# Patient Record
Sex: MALE | Race: BLACK OR AFRICAN AMERICAN | NOT HISPANIC OR LATINO | Employment: UNEMPLOYED | URBAN - METROPOLITAN AREA
[De-identification: names, ages, dates, MRNs, and addresses within clinical notes are randomized per-mention and may not be internally consistent; named-entity substitution may affect disease eponyms.]

---

## 2020-12-17 ENCOUNTER — HOSPITAL ENCOUNTER (EMERGENCY)
Facility: HOSPITAL | Age: 45
Discharge: HOME | End: 2020-12-17
Attending: EMERGENCY MEDICINE
Payer: MEDICAID

## 2020-12-17 VITALS
HEART RATE: 60 BPM | RESPIRATION RATE: 14 BRPM | SYSTOLIC BLOOD PRESSURE: 118 MMHG | WEIGHT: 162 LBS | OXYGEN SATURATION: 100 % | DIASTOLIC BLOOD PRESSURE: 68 MMHG | TEMPERATURE: 98 F | BODY MASS INDEX: 23.19 KG/M2 | HEIGHT: 70 IN

## 2020-12-17 DIAGNOSIS — Z20.822 COVID-19 VIRUS NOT DETECTED: Primary | ICD-10-CM

## 2020-12-17 LAB
CTP QC/QA: YES
SARS-COV-2 RDRP RESP QL NAA+PROBE: NEGATIVE

## 2020-12-17 PROCEDURE — 99282 EMERGENCY DEPT VISIT SF MDM: CPT | Mod: ,,, | Performed by: EMERGENCY MEDICINE

## 2020-12-17 PROCEDURE — 99284 EMERGENCY DEPT VISIT MOD MDM: CPT

## 2020-12-17 PROCEDURE — 99282 EMERGENCY DEPT VISIT SF MDM: CPT

## 2020-12-17 PROCEDURE — U0002 COVID-19 LAB TEST NON-CDC: HCPCS | Performed by: EMERGENCY MEDICINE

## 2020-12-17 PROCEDURE — 99282 PR EMERGENCY DEPT VISIT,LEVEL II: ICD-10-PCS | Mod: ,,, | Performed by: EMERGENCY MEDICINE

## 2020-12-17 NOTE — DISCHARGE INSTRUCTIONS
Your coronavirus test was NEGATIVE. This could be a false negative as the test is not 100% accurate. Given your symptoms, you should assume you have the virus and should act accordingly. Take over-the-counter medications such as Tylenol, Ibuprofen, Mucinex, Flonase, etc for further management of your symptoms. Hydrate by drinking plenty of water. Get plenty of sleep.    Starting now, you should self quarantine for at least 14 days. You should wear a mask when going out in public and avoid contact with people who are immunocompromised, pregnant or elderly.    Wash your hands frequently.  Do not attend public events until health authorities  otherwise.    So far from what we know about coronavirus, the people who get very sick tend to do so around day 8 of the illness.  If you rapidly worsen, you should return to the emergency department for reassessment.    Return to the Emergency Department for symptoms including but not limited to: worsening symptoms, shortness of breath or chest pain, vomiting with inability to hold down fluids, passing out/fainting/unconsciousness, or other concerning symptoms.

## 2020-12-17 NOTE — ED NOTES
Patient identifiers verified and correct for Mr Amin  C/C: COVID testing SEE NN  APPEARANCE: awake and alert in NAD.  SKIN: warm, dry and intact. No breakdown or bruising.  MUSCULOSKELETAL: Patient moving all extremities spontaneously, no obvious swelling or deformities noted. Ambulates independently.  RESPIRATORY: Denies shortness of breath.Respirations unlabored. Denies cough Denies fevers  ABDOMEN: S/ND/NT, Denies nausea  : voids spontaneously, denies difficulty  Neurologic: AAO x 4; follows commands equal strength in all extremities; denies numbness/tingling. Denies dizziness denies weakness

## 2020-12-17 NOTE — ED NOTES
Patient has been in room with family member in the ED, states he wants to get checked for COVID as the patient recently started coughing, denies symptoms.

## 2020-12-17 NOTE — ED PROVIDER NOTES
Encounter Date: 12/17/2020       History     Chief Complaint   Patient presents with    COVID-19 Concerns     denies symptoms     The history is provided by the patient. No  was used.      Barrington Amin is a 45 y.o. male with medical history of HTN, polysubstance abuse, Hx of SBO presenting to the ED with the chief complaint of COVID-19 concerns. Patient reports his wife was recently in the hospital for surgery and was coughing when she returned home. He expresses concern for COVID-19 exposure. He reports feeling at his baseline health and would not be in the ED otherwise. He denies fever, headache, sinus congestion, sore throat, chest pain, SOB, cough, abdominal pain, nausea, vomiting, urinary or bowel movement changes.        Review of patient's allergies indicates:  Not on File  No past medical history on file.  No past surgical history on file.  No family history on file.  Social History     Tobacco Use    Smoking status: Not on file   Substance Use Topics    Alcohol use: Not on file    Drug use: Not on file     Review of Systems   Constitutional: Negative for fever.   HENT: Negative for sore throat.    Respiratory: Negative for shortness of breath.    Cardiovascular: Negative for chest pain.   Gastrointestinal: Negative for nausea.   Genitourinary: Negative for dysuria.   Musculoskeletal: Negative for back pain.   Skin: Negative for rash.   Neurological: Negative for weakness.   Hematological: Does not bruise/bleed easily.       Physical Exam     Initial Vitals [12/17/20 1511]   BP Pulse Resp Temp SpO2   118/68 60 14 97.7 °F (36.5 °C) 100 %      MAP       --         Physical Exam    Constitutional: He appears well-developed and well-nourished. He is not diaphoretic. No distress.   HENT:   Head: Normocephalic and atraumatic.   Mouth/Throat: Oropharynx is clear and moist.   Eyes: EOM are normal. Pupils are equal, round, and reactive to light.   Neck: Normal range of motion.   Cardiovascular:  Normal rate and regular rhythm.   Pulmonary/Chest: No respiratory distress.   Speaking full sentences without difficulty. No accessory muscle use.   Musculoskeletal: Normal range of motion.   Neurological: He is alert and oriented to person, place, and time.   Skin: Skin is warm and dry. No rash noted.         ED Course   Procedures  Labs Reviewed   SARS-COV-2 RDRP GENE    Narrative:     This test utilizes isothermal nucleic acid amplification   technology to detect the SARS-CoV-2 RdRp nucleic acid segment.   The analytical sensitivity (limit of detection) is 125 genome   equivalents/mL.   A POSITIVE result implies infection with the SARS-CoV-2 virus;   the patient is presumed to be contagious.     A NEGATIVE result means that SARS-CoV-2 nucleic acids are not   present above the limit of detection. A NEGATIVE result should be   treated as presumptive. It does not rule out the possibility of   COVID-19 and should not be the sole basis for treatment decisions.   If COVID-19 is strongly suspected based on clinical and exposure   history, re-testing using an alternate molecular assay should be   considered.   This test is only for use under the Food and Drug   Administration s Emergency Use Authorization (EUA).   Commercial kits are provided by [employer].   Performance characteristics of the EUA have been independently   verified by [facility] Department of   Pathology and Laboratory Medicine.   _________________________________________________________________   The authorized Fact Sheet for Healthcare Providers and the authorized Fact   Sheet for Patients of the ID NOW COVID-19 are available on the FDA   website:     https://www.fda.gov/media/930325/download  https://www.fda.gov/media/054296/download                Imaging Results    None          Medical Decision Making:   History:   Old Medical Records: I decided to obtain old medical records.  Old Records Summarized: records from clinic  visits.  Clinical Tests:   Lab Tests: Reviewed and Ordered       APC / Resident Notes:   45 y.o. male with medical history of HTN, polysubstance abuse, Hx of SBO presenting to the ED c/o COVID-19 concerns. Reports wife was coughing after returning home from surgery and expresses concern for COVID-19 exposure. He denies any symptoms and feels at his baseline health. DDx includes but not limited to viral syndrome, COVID-19, pneumonia, reactive airway disease.    COVID-19 negative. Advised patient that this could be a false negative and to take proper precautions. Advised to self quarantine, wear a mask in public, hand hygiene, etc. He denies any symptoms and feels at his baseline health. No hypoxia or respiratory distress. Patient expresses understanding and agreeable to the plan. Return to ED precautions given for new, worsening, or concerning symptoms.                         Clinical Impression:     ICD-10-CM ICD-9-CM   1. COVID-19 virus not detected  Z03.818 V71.83                      Disposition:   Disposition: Discharged  Condition: Stable     ED Disposition Condition    Discharge Stable        ED Prescriptions     None        Follow-up Information     Follow up With Specialties Details Why Contact Info Additional Information    [facility]   [address]  [facility] [id number] [phone] [facility] Please park in surface lot and check in at central registration desk                                       Hola Vasquez PA-C  12/17/20 1474

## 2021-03-13 ENCOUNTER — IMMUNIZATION (OUTPATIENT)
Dept: PRIMARY CARE CLINIC | Facility: CLINIC | Age: 46
End: 2021-03-13

## 2021-03-13 DIAGNOSIS — Z23 NEED FOR VACCINATION: Primary | ICD-10-CM

## 2021-03-13 PROCEDURE — 91300 PR SARS-COV- 2 COVID-19 VACCINE, NO PRSV, 30MCG/0.3ML, IM: CPT | Mod: S$GLB,,, | Performed by: INTERNAL MEDICINE

## 2021-03-13 PROCEDURE — 0001A PR IMMUNIZ ADMIN, SARS-COV-2 COVID-19 VACC, 30MCG/0.3ML, 1ST DOSE: ICD-10-PCS | Mod: CV19,S$GLB,, | Performed by: INTERNAL MEDICINE

## 2021-03-13 PROCEDURE — 91300 PR SARS-COV- 2 COVID-19 VACCINE, NO PRSV, 30MCG/0.3ML, IM: ICD-10-PCS | Mod: S$GLB,,, | Performed by: INTERNAL MEDICINE

## 2021-03-13 PROCEDURE — 0001A PR IMMUNIZ ADMIN, SARS-COV-2 COVID-19 VACC, 30MCG/0.3ML, 1ST DOSE: CPT | Mod: CV19,S$GLB,, | Performed by: INTERNAL MEDICINE

## 2021-03-13 RX ADMIN — Medication 0.3 ML: at 08:03

## 2021-04-03 ENCOUNTER — IMMUNIZATION (OUTPATIENT)
Dept: PRIMARY CARE CLINIC | Facility: CLINIC | Age: 46
End: 2021-04-03
Payer: MEDICAID

## 2021-04-03 DIAGNOSIS — Z23 NEED FOR VACCINATION: Primary | ICD-10-CM

## 2021-04-03 PROCEDURE — 0002A PR IMMUNIZ ADMIN, SARS-COV-2 COVID-19 VACC, 30MCG/0.3ML, 2ND DOSE: ICD-10-PCS | Mod: CV19,S$GLB,, | Performed by: INTERNAL MEDICINE

## 2021-04-03 PROCEDURE — 0002A PR IMMUNIZ ADMIN, SARS-COV-2 COVID-19 VACC, 30MCG/0.3ML, 2ND DOSE: CPT | Mod: CV19,S$GLB,, | Performed by: INTERNAL MEDICINE

## 2021-04-03 PROCEDURE — 91300 PR SARS-COV- 2 COVID-19 VACCINE, NO PRSV, 30MCG/0.3ML, IM: CPT | Mod: S$GLB,,, | Performed by: INTERNAL MEDICINE

## 2021-04-03 PROCEDURE — 91300 PR SARS-COV- 2 COVID-19 VACCINE, NO PRSV, 30MCG/0.3ML, IM: ICD-10-PCS | Mod: S$GLB,,, | Performed by: INTERNAL MEDICINE

## 2021-04-03 RX ADMIN — Medication 0.3 ML: at 08:04

## 2021-10-13 ENCOUNTER — IMMUNIZATION (OUTPATIENT)
Dept: INTERNAL MEDICINE | Facility: CLINIC | Age: 46
End: 2021-10-13
Payer: MEDICAID

## 2021-10-13 DIAGNOSIS — Z23 NEED FOR VACCINATION: Primary | ICD-10-CM

## 2021-10-13 PROCEDURE — 91300 COVID-19, MRNA, LNP-S, PF, 30 MCG/0.3 ML DOSE VACCINE: CPT | Mod: PBBFAC

## 2021-10-13 PROCEDURE — 0003A COVID-19, MRNA, LNP-S, PF, 30 MCG/0.3 ML DOSE VACCINE: CPT | Mod: PBBFAC,CV19

## 2022-09-24 ENCOUNTER — IMMUNIZATION (OUTPATIENT)
Dept: PRIMARY CARE CLINIC | Facility: CLINIC | Age: 47
End: 2022-09-24
Payer: MEDICAID

## 2022-09-24 PROCEDURE — 90686 IIV4 VACC NO PRSV 0.5 ML IM: CPT | Mod: PBBFAC,PN

## 2022-11-16 ENCOUNTER — IMMUNIZATION (OUTPATIENT)
Dept: INTERNAL MEDICINE | Facility: CLINIC | Age: 47
End: 2022-11-16
Payer: MEDICAID

## 2022-11-16 DIAGNOSIS — Z23 NEED FOR VACCINATION: Primary | ICD-10-CM

## 2022-11-16 PROCEDURE — 91312 COVID-19, MRNA, LNP-S, BIVALENT BOOSTER, PF, 30 MCG/0.3 ML DOSE: CPT | Mod: PBBFAC

## 2022-11-16 PROCEDURE — 0124A COVID-19, MRNA, LNP-S, BIVALENT BOOSTER, PF, 30 MCG/0.3 ML DOSE: CPT | Mod: CV19,PBBFAC | Performed by: INTERNAL MEDICINE

## 2023-02-27 ENCOUNTER — TELEPHONE (OUTPATIENT)
Dept: UROLOGY | Facility: CLINIC | Age: 48
End: 2023-02-27
Payer: COMMERCIAL

## 2023-02-27 NOTE — TELEPHONE ENCOUNTER
SPOKE TO THE PATIENT'S WIFE , ALBER CONTACT THE OFFICE        YEMI DOMINGUEZ    ----- Message from Yemi Kaur MA sent at 2/24/2023  4:11 PM CST -----  Regarding: FW: Patient call back    ----- Message -----  From: Renetta Wilks  Sent: 2/24/2023  11:25 AM CST  To: [facility] Staff  Subject: Patient call back                                Who called: Tnaa (wife)    What is the request in detail: Patient is requesting a call back. Pt would like to schedule an appt with the provider for [employer] health. No further details provided. She would like to further discuss.   Please advise.    Can the clinic reply by [facility]? No    Best call back number: [phone]    Additional Information: N/A

## 2023-03-20 ENCOUNTER — TELEPHONE (OUTPATIENT)
Dept: INTERNAL MEDICINE | Facility: CLINIC | Age: 48
End: 2023-03-20
Payer: COMMERCIAL

## 2023-03-23 NOTE — TELEPHONE ENCOUNTER
----- Message from Ruby Story sent at 3/20/2023  8:27 AM CDT -----  Type:  Sooner Apoointment Request    Caller is requesting a sooner appointment.  Caller declined first available appointment listed below.  Caller will not accept being placed on the waitlist and is requesting a message be sent to doctor.  Name of Caller:pt   When is the first available appointment?  Symptoms:est care   Would the patient rather a call back or a response via [facility]? [facility]   Best Call Back Number:[phone]  Additional Information: pt would like to est care        
Called pt and helped set up an appt with Dr. Wong. Appt sent to advance user, 04/05 @ 11:30 am   
Spoke to spouse,     Pt is looking for a PCP, Dr. Mazariegos is not taking on new patients. Would you be willing to take this pt on, and if so are you okay with me getting him put on your schedule sooner if it needs to be overrided?   
Home

## 2023-04-05 PROBLEM — B18.2 CHRONIC HEPATITIS C WITHOUT HEPATIC COMA: Status: ACTIVE | Noted: 2018-02-19

## 2024-01-04 ENCOUNTER — HOSPITAL ENCOUNTER (OUTPATIENT)
Dept: RADIOLOGY | Facility: HOSPITAL | Age: 49
Discharge: HOME | End: 2024-01-04
Attending: PHYSICIAN ASSISTANT
Payer: COMMERCIAL

## 2024-01-04 ENCOUNTER — HOSPITAL ENCOUNTER (OUTPATIENT)
Dept: RADIOLOGY | Facility: OTHER | Age: 49
Discharge: HOME | End: 2024-01-04
Attending: PHYSICIAN ASSISTANT
Payer: COMMERCIAL

## 2024-01-04 ENCOUNTER — OFFICE VISIT (OUTPATIENT)
Dept: ORTHOPEDICS | Facility: CLINIC | Age: 49
End: 2024-01-04
Payer: COMMERCIAL

## 2024-01-04 VITALS — HEIGHT: 70 IN | WEIGHT: 162.06 LBS | BODY MASS INDEX: 23.2 KG/M2
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
## 2024-01-04 DIAGNOSIS — M25.551 RIGHT HIP PAIN: ICD-10-CM

## 2024-01-04 DIAGNOSIS — F19.10 IV DRUG ABUSE: ICD-10-CM

## 2024-01-04 DIAGNOSIS — M25.551 RIGHT HIP PAIN: Primary | ICD-10-CM

## 2024-01-04 DIAGNOSIS — F11.11 HISTORY OF HEROIN ABUSE: ICD-10-CM

## 2024-01-04 DIAGNOSIS — B18.2 CHRONIC HEPATITIS C WITHOUT HEPATIC COMA: ICD-10-CM

## 2024-01-04 PROCEDURE — 73502 X-RAY EXAM HIP UNI 2-3 VIEWS: CPT | Mod: 26,RT,, | Performed by: RADIOLOGY

## 2024-01-04 PROCEDURE — 99999 PR PBB SHADOW E&M-EST. PATIENT-LVL III: CPT | Mod: PBBFAC,,, | Performed by: PHYSICIAN ASSISTANT

## 2024-01-04 PROCEDURE — 72195 MRI PELVIS W/O DYE: CPT | Mod: 26,,, | Performed by: RADIOLOGY

## 2024-01-04 PROCEDURE — 99204 OFFICE O/P NEW MOD 45 MIN: CPT | Mod: S$GLB,,, | Performed by: PHYSICIAN ASSISTANT

## 2024-01-04 PROCEDURE — 73502 X-RAY EXAM HIP UNI 2-3 VIEWS: CPT | Mod: TC,RT

## 2024-01-04 PROCEDURE — 3008F BODY MASS INDEX DOCD: CPT | Mod: CPTII,S$GLB,, | Performed by: PHYSICIAN ASSISTANT

## 2024-01-04 PROCEDURE — 72195 MRI PELVIS W/O DYE: CPT | Mod: TC

## 2024-01-04 PROCEDURE — 1159F MED LIST DOCD IN RCRD: CPT | Mod: CPTII,S$GLB,, | Performed by: PHYSICIAN ASSISTANT
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
## 2024-01-05 ENCOUNTER — HOSPITAL ENCOUNTER (INPATIENT)
Facility: HOSPITAL | Age: 49
LOS: 7 days | Discharge: LEFT BEFORE DISCHARGE | DRG: 481 | End: 2024-01-12
Attending: EMERGENCY MEDICINE | Admitting: HOSPITALIST
Payer: COMMERCIAL

## 2024-01-05 ENCOUNTER — TELEPHONE (OUTPATIENT)
Dept: ORTHOPEDICS | Facility: CLINIC | Age: 49
End: 2024-01-05
Payer: COMMERCIAL

## 2024-01-05 DIAGNOSIS — F19.90 IVDU (INTRAVENOUS DRUG USER): ICD-10-CM

## 2024-01-05 DIAGNOSIS — E44.0 MALNUTRITION OF MODERATE DEGREE: ICD-10-CM

## 2024-01-05 DIAGNOSIS — B18.2 CHRONIC HEPATITIS C WITHOUT HEPATIC COMA: ICD-10-CM

## 2024-01-05 DIAGNOSIS — F19.90 SUBSTANCE USE DISORDER: ICD-10-CM

## 2024-01-05 DIAGNOSIS — M25.551 RIGHT HIP PAIN: Primary | ICD-10-CM

## 2024-01-05 DIAGNOSIS — F11.93 OPIOID WITHDRAWAL: ICD-10-CM

## 2024-01-05 DIAGNOSIS — R07.9 CHEST PAIN: ICD-10-CM

## 2024-01-05 DIAGNOSIS — M00.9 SEPTIC ARTHRITIS OF HIP: Primary | ICD-10-CM

## 2024-01-05 DIAGNOSIS — R78.81 MRSA BACTEREMIA: ICD-10-CM

## 2024-01-05 DIAGNOSIS — M00.9 PYOGENIC ARTHRITIS OF RIGHT HIP, DUE TO UNSPECIFIED ORGANISM: ICD-10-CM

## 2024-01-05 DIAGNOSIS — D63.8 ANEMIA OF CHRONIC DISEASE: ICD-10-CM

## 2024-01-05 DIAGNOSIS — T07.XXXA MULTIPLE OPEN WOUNDS: ICD-10-CM

## 2024-01-05 DIAGNOSIS — M86.159: ICD-10-CM

## 2024-01-05 DIAGNOSIS — L98.492 CHRONIC SKIN ULCER WITH FAT LAYER EXPOSED: ICD-10-CM

## 2024-01-05 DIAGNOSIS — B95.62 MRSA BACTEREMIA: ICD-10-CM

## 2024-01-05 DIAGNOSIS — R78.81 BACTEREMIA: ICD-10-CM

## 2024-01-05 DIAGNOSIS — M00.051 STAPHYLOCOCCAL ARTHRITIS OF RIGHT HIP: ICD-10-CM

## 2024-01-05 LAB
ALBUMIN SERPL BCP-MCNC: 2.7 G/DL (ref 3.5–5.2)
ALP SERPL-CCNC: 109 U/L (ref 55–135)
ALT SERPL W/O P-5'-P-CCNC: 19 U/L (ref 10–44)
ANION GAP SERPL CALC-SCNC: 12 MMOL/L (ref 8–16)
AST SERPL-CCNC: 26 U/L (ref 10–40)
BASOPHILS # BLD AUTO: 0.02 K/UL (ref 0–0.2)
BASOPHILS NFR BLD: 0.3 % (ref 0–1.9)
BILIRUB SERPL-MCNC: 0.3 MG/DL (ref 0.1–1)
BUN SERPL-MCNC: 13 MG/DL (ref 6–20)
CALCIUM SERPL-MCNC: 9.7 MG/DL (ref 8.7–10.5)
CHLORIDE SERPL-SCNC: 98 MMOL/L (ref 95–110)
CO2 SERPL-SCNC: 23 MMOL/L (ref 23–29)
CREAT SERPL-MCNC: 0.8 MG/DL (ref 0.5–1.4)
CRP SERPL-MCNC: 112.9 MG/L (ref 0–8.2)
DIFFERENTIAL METHOD BLD: ABNORMAL
EOSINOPHIL # BLD AUTO: 0.1 K/UL (ref 0–0.5)
EOSINOPHIL NFR BLD: 0.9 % (ref 0–8)
ERYTHROCYTE [DISTWIDTH] IN BLOOD BY AUTOMATED COUNT: 18.1 % (ref 11.5–14.5)
EST. GFR  (NO RACE VARIABLE): >60 ML/MIN/1.73 M^2
GLUCOSE SERPL-MCNC: 85 MG/DL (ref 70–110)
HCT VFR BLD AUTO: 32.6 % (ref 40–54)
HGB BLD-MCNC: 9.5 G/DL (ref 14–18)
IMM GRANULOCYTES # BLD AUTO: 0.03 K/UL (ref 0–0.04)
IMM GRANULOCYTES NFR BLD AUTO: 0.4 % (ref 0–0.5)
LYMPHOCYTES # BLD AUTO: 1.6 K/UL (ref 1–4.8)
LYMPHOCYTES NFR BLD: 20.7 % (ref 18–48)
MAGNESIUM SERPL-MCNC: 2 MG/DL (ref 1.6–2.6)
MCH RBC QN AUTO: 20.4 PG (ref 27–31)
MCHC RBC AUTO-ENTMCNC: 29.1 G/DL (ref 32–36)
MCV RBC AUTO: 70 FL (ref 82–98)
MONOCYTES # BLD AUTO: 0.4 K/UL (ref 0.3–1)
MONOCYTES NFR BLD: 5.8 % (ref 4–15)
NEUTROPHILS # BLD AUTO: 5.5 K/UL (ref 1.8–7.7)
NEUTROPHILS NFR BLD: 71.9 % (ref 38–73)
NRBC BLD-RTO: 0 /100 WBC
PLATELET # BLD AUTO: 592 K/UL (ref 150–450)
PMV BLD AUTO: 8.2 FL (ref 9.2–12.9)
POTASSIUM SERPL-SCNC: 4.8 MMOL/L (ref 3.5–5.1)
PROT SERPL-MCNC: 10.4 G/DL (ref 6–8.4)
RBC # BLD AUTO: 4.66 M/UL (ref 4.6–6.2)
SODIUM SERPL-SCNC: 133 MMOL/L (ref 136–145)
WBC # BLD AUTO: 7.65 K/UL (ref 3.9–12.7)

## 2024-01-05 PROCEDURE — 87077 CULTURE AEROBIC IDENTIFY: CPT | Performed by: PHYSICIAN ASSISTANT

## 2024-01-05 PROCEDURE — 87040 BLOOD CULTURE FOR BACTERIA: CPT | Mod: 59 | Performed by: PHYSICIAN ASSISTANT

## 2024-01-05 PROCEDURE — 86140 C-REACTIVE PROTEIN: CPT | Performed by: PHYSICIAN ASSISTANT

## 2024-01-05 PROCEDURE — 85652 RBC SED RATE AUTOMATED: CPT | Performed by: PHYSICIAN ASSISTANT

## 2024-01-05 PROCEDURE — 87154 CUL TYP ID BLD PTHGN 6+ TRGT: CPT | Performed by: PHYSICIAN ASSISTANT

## 2024-01-05 PROCEDURE — 99285 EMERGENCY DEPT VISIT HI MDM: CPT | Mod: 25

## 2024-01-05 PROCEDURE — 25000003 PHARM REV CODE 250: Performed by: PHYSICIAN ASSISTANT

## 2024-01-05 PROCEDURE — 80053 COMPREHEN METABOLIC PANEL: CPT | Performed by: PHYSICIAN ASSISTANT

## 2024-01-05 PROCEDURE — 96375 TX/PRO/DX INJ NEW DRUG ADDON: CPT

## 2024-01-05 PROCEDURE — 85025 COMPLETE CBC W/AUTO DIFF WBC: CPT | Performed by: PHYSICIAN ASSISTANT

## 2024-01-05 PROCEDURE — 63600175 PHARM REV CODE 636 W HCPCS: Performed by: PHYSICIAN ASSISTANT

## 2024-01-05 PROCEDURE — 12000002 HC ACUTE/MED SURGE SEMI-PRIVATE ROOM

## 2024-01-05 PROCEDURE — 87186 SC STD MICRODIL/AGAR DIL: CPT | Performed by: PHYSICIAN ASSISTANT

## 2024-01-05 PROCEDURE — 83735 ASSAY OF MAGNESIUM: CPT | Performed by: PHYSICIAN ASSISTANT

## 2024-01-05 PROCEDURE — 96365 THER/PROPH/DIAG IV INF INIT: CPT

## 2024-01-05 RX ORDER — HYDROMORPHONE HYDROCHLORIDE 1 MG/ML
0.5 INJECTION, SOLUTION INTRAMUSCULAR; INTRAVENOUS; SUBCUTANEOUS
Status: COMPLETED | OUTPATIENT
Start: 2024-01-05 | End: 2024-01-05

## 2024-01-05 RX ADMIN — SODIUM CHLORIDE, POTASSIUM CHLORIDE, SODIUM LACTATE AND CALCIUM CHLORIDE 1000 ML: 600; 310; 30; 20 INJECTION, SOLUTION INTRAVENOUS at 11:01

## 2024-01-05 RX ADMIN — VANCOMYCIN HYDROCHLORIDE 1500 MG: 1.5 INJECTION, POWDER, LYOPHILIZED, FOR SOLUTION INTRAVENOUS at 10:01

## 2024-01-05 RX ADMIN — HYDROMORPHONE HYDROCHLORIDE 0.5 MG: 1 INJECTION, SOLUTION INTRAMUSCULAR; INTRAVENOUS; SUBCUTANEOUS at 11:01

## 2024-01-05 RX ADMIN — PIPERACILLIN SODIUM AND TAZOBACTAM SODIUM 4.5 G: 4; .5 INJECTION, POWDER, FOR SOLUTION INTRAVENOUS at 09:01

## 2024-01-05 NOTE — Clinical Note
Diagnosis: Septic arthritis of hip [302310]   Future Attending Provider: ESTUARDO CORNEJO [[id number]]   Admitting Provider:: ESTUARDO CORNEJO [[id number]]   Reason for IP Medical Treatment  (Clinical interventions that can only be accomplished in the IP setting? ) :: Septic hip, abscess   I certify that Inpatient services for greater than or equal to 2 midnights are medically necessary:: Yes   Plans for Post-Acute care--if anticipated (pick the single best option):: A. No post acute care anticipated at this time

## 2024-01-05 NOTE — TELEPHONE ENCOUNTER
Called patient and reviewed labs and MRI results. Referred to the ED. Patient voiced understanding.

## 2024-01-06 ENCOUNTER — ANESTHESIA EVENT (OUTPATIENT)
Dept: SURGERY | Facility: HOSPITAL | Age: 49
DRG: 481 | End: 2024-01-06
Payer: COMMERCIAL

## 2024-01-06 ENCOUNTER — ANESTHESIA (OUTPATIENT)
Dept: SURGERY | Facility: HOSPITAL | Age: 49
DRG: 481 | End: 2024-01-06
Payer: COMMERCIAL

## 2024-01-06 PROBLEM — F19.90 SUBSTANCE USE DISORDER: Status: ACTIVE | Noted: 2024-01-06

## 2024-01-06 PROBLEM — Z71.89 ACP (ADVANCE CARE PLANNING): Status: ACTIVE | Noted: 2024-01-06

## 2024-01-06 PROBLEM — T07.XXXA MULTIPLE OPEN WOUNDS: Status: ACTIVE | Noted: 2024-01-06

## 2024-01-06 PROBLEM — E44.0 MALNUTRITION OF MODERATE DEGREE: Status: ACTIVE | Noted: 2024-01-06

## 2024-01-06 PROBLEM — Z01.818 PREOPERATIVE CLEARANCE: Status: ACTIVE | Noted: 2024-01-06

## 2024-01-06 PROBLEM — M86.159: Status: ACTIVE | Noted: 2024-01-06

## 2024-01-06 LAB
ACINETOBACTER CALCOACETICUS/BAUMANNII COMPLEX: NOT DETECTED
ALBUMIN SERPL BCP-MCNC: 2.3 G/DL (ref 3.5–5.2)
ALP SERPL-CCNC: 98 U/L (ref 55–135)
ALT SERPL W/O P-5'-P-CCNC: 15 U/L (ref 10–44)
ANION GAP SERPL CALC-SCNC: 10 MMOL/L (ref 8–16)
AST SERPL-CCNC: 20 U/L (ref 10–40)
BACTEROIDES FRAGILIS: NOT DETECTED
BASOPHILS # BLD AUTO: 0.02 K/UL (ref 0–0.2)
BASOPHILS NFR BLD: 0.3 % (ref 0–1.9)
BILIRUB SERPL-MCNC: 0.4 MG/DL (ref 0.1–1)
BUN SERPL-MCNC: 14 MG/DL (ref 6–20)
CALCIUM SERPL-MCNC: 9.3 MG/DL (ref 8.7–10.5)
CANDIDA ALBICANS: NOT DETECTED
CANDIDA AURIS: NOT DETECTED
CANDIDA GLABRATA: NOT DETECTED
CANDIDA KRUSEI: NOT DETECTED
CANDIDA PARAPSILOSIS: NOT DETECTED
CANDIDA TROPICALIS: NOT DETECTED
CHLORIDE SERPL-SCNC: 100 MMOL/L (ref 95–110)
CO2 SERPL-SCNC: 23 MMOL/L (ref 23–29)
CREAT SERPL-MCNC: 0.8 MG/DL (ref 0.5–1.4)
CRYPTOCOCCUS NEOFORMANS/GATTII: NOT DETECTED
CTX-M GENE (ESBL PRODUCER): ABNORMAL
DIFFERENTIAL METHOD BLD: ABNORMAL
ENTEROBACTER CLOACAE COMPLEX: NOT DETECTED
ENTEROBACTERALES: NOT DETECTED
ENTEROCOCCUS FAECALIS: NOT DETECTED
ENTEROCOCCUS FAECIUM: NOT DETECTED
EOSINOPHIL # BLD AUTO: 0.1 K/UL (ref 0–0.5)
EOSINOPHIL NFR BLD: 1.8 % (ref 0–8)
ERYTHROCYTE [DISTWIDTH] IN BLOOD BY AUTOMATED COUNT: 17.8 % (ref 11.5–14.5)
ERYTHROCYTE [SEDIMENTATION RATE] IN BLOOD BY PHOTOMETRIC METHOD: 100 MM/HR (ref 0–23)
ESCHERICHIA COLI: NOT DETECTED
EST. GFR  (NO RACE VARIABLE): >60 ML/MIN/1.73 M^2
FERRITIN SERPL-MCNC: 85 NG/ML (ref 20–300)
FOLATE SERPL-MCNC: 6.4 NG/ML (ref 4–24)
GLUCOSE SERPL-MCNC: 101 MG/DL (ref 70–110)
HAEMOPHILUS INFLUENZAE: NOT DETECTED
HBV SURFACE AG SERPL QL IA: NORMAL
HCT VFR BLD AUTO: 28.2 % (ref 40–54)
HGB BLD-MCNC: 8.1 G/DL (ref 14–18)
HIV 1+2 AB+HIV1 P24 AG SERPL QL IA: NORMAL
IMM GRANULOCYTES # BLD AUTO: 0.03 K/UL (ref 0–0.04)
IMM GRANULOCYTES NFR BLD AUTO: 0.5 % (ref 0–0.5)
IMP GENE (CARBAPENEM RESISTANT): ABNORMAL
IRON SERPL-MCNC: 17 UG/DL (ref 45–160)
KLEBSIELLA AEROGENES: NOT DETECTED
KLEBSIELLA OXYTOCA: NOT DETECTED
KLEBSIELLA PNEUMONIAE GROUP: NOT DETECTED
KPC RESISTANCE GENE (CARBAPENEM): ABNORMAL
LISTERIA MONOCYTOGENES: NOT DETECTED
LYMPHOCYTES # BLD AUTO: 1.4 K/UL (ref 1–4.8)
LYMPHOCYTES NFR BLD: 23.1 % (ref 18–48)
MAGNESIUM SERPL-MCNC: 1.9 MG/DL (ref 1.6–2.6)
MCH RBC QN AUTO: 20.1 PG (ref 27–31)
MCHC RBC AUTO-ENTMCNC: 28.7 G/DL (ref 32–36)
MCR-1: ABNORMAL
MCV RBC AUTO: 70 FL (ref 82–98)
MEC A/C AND MREJ (MRSA): DETECTED
MEC A/C: ABNORMAL
MONOCYTES # BLD AUTO: 0.7 K/UL (ref 0.3–1)
MONOCYTES NFR BLD: 12.1 % (ref 4–15)
NDM GENE (CARBAPENEM RESISTANT): ABNORMAL
NEISSERIA MENINGITIDIS: NOT DETECTED
NEUTROPHILS # BLD AUTO: 3.8 K/UL (ref 1.8–7.7)
NEUTROPHILS NFR BLD: 62.2 % (ref 38–73)
NRBC BLD-RTO: 0 /100 WBC
OXA-48-LIKE (CARBAPENEM RESISTANT): ABNORMAL
PHOSPHATE SERPL-MCNC: 3.5 MG/DL (ref 2.7–4.5)
PLATELET # BLD AUTO: 502 K/UL (ref 150–450)
PMV BLD AUTO: 7.8 FL (ref 9.2–12.9)
POTASSIUM SERPL-SCNC: 3.7 MMOL/L (ref 3.5–5.1)
PROT SERPL-MCNC: 8.7 G/DL (ref 6–8.4)
PROTEUS SPECIES: NOT DETECTED
PSEUDOMONAS AERUGINOSA: NOT DETECTED
RBC # BLD AUTO: 4.03 M/UL (ref 4.6–6.2)
SALMONELLA SP: NOT DETECTED
SATURATED IRON: 5 % (ref 20–50)
SERRATIA MARCESCENS: NOT DETECTED
SODIUM SERPL-SCNC: 133 MMOL/L (ref 136–145)
STAPHYLOCOCCUS AUREUS: DETECTED
STAPHYLOCOCCUS EPIDERMIDIS: NOT DETECTED
STAPHYLOCOCCUS LUGDUNESIS: NOT DETECTED
STAPHYLOCOCCUS SPECIES: ABNORMAL
STENOTROPHOMONAS MALTOPHILIA: NOT DETECTED
STREPTOCOCCUS AGALACTIAE: NOT DETECTED
STREPTOCOCCUS PNEUMONIAE: NOT DETECTED
STREPTOCOCCUS PYOGENES: NOT DETECTED
STREPTOCOCCUS SPECIES: NOT DETECTED
TOTAL IRON BINDING CAPACITY: 330 UG/DL (ref 250–450)
TRANSFERRIN SERPL-MCNC: 223 MG/DL (ref 200–375)
VAN A/B (VRE GENE): ABNORMAL
VANCOMYCIN SERPL-MCNC: 13.3 UG/ML
VIM GENE (CARBAPENEM RESISTANT): ABNORMAL
VIT B12 SERPL-MCNC: 386 PG/ML (ref 210–950)
WBC # BLD AUTO: 6.03 K/UL (ref 3.9–12.7)

## 2024-01-06 PROCEDURE — 87522 HEPATITIS C REVRS TRNSCRPJ: CPT | Performed by: HOSPITALIST

## 2024-01-06 PROCEDURE — 71000033 HC RECOVERY, INTIAL HOUR: Performed by: ORTHOPAEDIC SURGERY

## 2024-01-06 PROCEDURE — D9220A PRA ANESTHESIA: Mod: CRNA,,, | Performed by: NURSE ANESTHETIST, CERTIFIED REGISTERED

## 2024-01-06 PROCEDURE — 63600175 PHARM REV CODE 636 W HCPCS: Performed by: ORTHOPAEDIC SURGERY

## 2024-01-06 PROCEDURE — 87075 CULTR BACTERIA EXCEPT BLOOD: CPT | Performed by: ORTHOPAEDIC SURGERY

## 2024-01-06 PROCEDURE — C1729 CATH, DRAINAGE: HCPCS | Performed by: ORTHOPAEDIC SURGERY

## 2024-01-06 PROCEDURE — 87205 SMEAR GRAM STAIN: CPT | Mod: 59 | Performed by: ORTHOPAEDIC SURGERY

## 2024-01-06 PROCEDURE — 25000003 PHARM REV CODE 250: Performed by: HOSPITALIST

## 2024-01-06 PROCEDURE — S5010 5% DEXTROSE AND 0.45% SALINE: HCPCS | Performed by: HOSPITALIST

## 2024-01-06 PROCEDURE — 85025 COMPLETE CBC W/AUTO DIFF WBC: CPT | Performed by: HOSPITALIST

## 2024-01-06 PROCEDURE — 99499 UNLISTED E&M SERVICE: CPT | Mod: ,,, | Performed by: STUDENT IN AN ORGANIZED HEALTH CARE EDUCATION/TRAINING PROGRAM

## 2024-01-06 PROCEDURE — 27030 ARTHROTOMY HIP W/DRAINAGE: CPT | Mod: RT,,, | Performed by: ORTHOPAEDIC SURGERY

## 2024-01-06 PROCEDURE — 63600175 PHARM REV CODE 636 W HCPCS

## 2024-01-06 PROCEDURE — 87070 CULTURE OTHR SPECIMN AEROBIC: CPT | Mod: 59 | Performed by: ORTHOPAEDIC SURGERY

## 2024-01-06 PROCEDURE — 99223 1ST HOSP IP/OBS HIGH 75: CPT | Mod: ,,, | Performed by: STUDENT IN AN ORGANIZED HEALTH CARE EDUCATION/TRAINING PROGRAM

## 2024-01-06 PROCEDURE — 87340 HEPATITIS B SURFACE AG IA: CPT | Performed by: STUDENT IN AN ORGANIZED HEALTH CARE EDUCATION/TRAINING PROGRAM

## 2024-01-06 PROCEDURE — 84100 ASSAY OF PHOSPHORUS: CPT | Performed by: HOSPITALIST

## 2024-01-06 PROCEDURE — 88305 TISSUE EXAM BY PATHOLOGIST: CPT | Mod: 26,,, | Performed by: STUDENT IN AN ORGANIZED HEALTH CARE EDUCATION/TRAINING PROGRAM

## 2024-01-06 PROCEDURE — 25000003 PHARM REV CODE 250: Performed by: NURSE ANESTHETIST, CERTIFIED REGISTERED

## 2024-01-06 PROCEDURE — 11000001 HC ACUTE MED/SURG PRIVATE ROOM

## 2024-01-06 PROCEDURE — 36000709 HC OR TIME LEV III EA ADD 15 MIN: Performed by: ORTHOPAEDIC SURGERY

## 2024-01-06 PROCEDURE — 87102 FUNGUS ISOLATION CULTURE: CPT | Mod: 59 | Performed by: ORTHOPAEDIC SURGERY

## 2024-01-06 PROCEDURE — 71000016 HC POSTOP RECOV ADDL HR: Performed by: ORTHOPAEDIC SURGERY

## 2024-01-06 PROCEDURE — 63600175 PHARM REV CODE 636 W HCPCS: Performed by: NURSE ANESTHETIST, CERTIFIED REGISTERED

## 2024-01-06 PROCEDURE — 36000708 HC OR TIME LEV III 1ST 15 MIN: Performed by: ORTHOPAEDIC SURGERY

## 2024-01-06 PROCEDURE — 3E0U029 INTRODUCTION OF OTHER ANTI-INFECTIVE INTO JOINTS, OPEN APPROACH: ICD-10-PCS | Performed by: ORTHOPAEDIC SURGERY

## 2024-01-06 PROCEDURE — 86706 HEP B SURFACE ANTIBODY: CPT | Performed by: STUDENT IN AN ORGANIZED HEALTH CARE EDUCATION/TRAINING PROGRAM

## 2024-01-06 PROCEDURE — 37000009 HC ANESTHESIA EA ADD 15 MINS: Performed by: ORTHOPAEDIC SURGERY

## 2024-01-06 PROCEDURE — 82607 VITAMIN B-12: CPT | Performed by: HOSPITALIST

## 2024-01-06 PROCEDURE — 99900035 HC TECH TIME PER 15 MIN (STAT)

## 2024-01-06 PROCEDURE — 82746 ASSAY OF FOLIC ACID SERUM: CPT | Performed by: HOSPITALIST

## 2024-01-06 PROCEDURE — 25000003 PHARM REV CODE 250

## 2024-01-06 PROCEDURE — D9220A PRA ANESTHESIA: Mod: ANES,,, | Performed by: ANESTHESIOLOGY

## 2024-01-06 PROCEDURE — 94761 N-INVAS EAR/PLS OXIMETRY MLT: CPT

## 2024-01-06 PROCEDURE — 27100171 HC OXYGEN HIGH FLOW UP TO 24 HOURS

## 2024-01-06 PROCEDURE — 87389 HIV-1 AG W/HIV-1&-2 AB AG IA: CPT | Performed by: STUDENT IN AN ORGANIZED HEALTH CARE EDUCATION/TRAINING PROGRAM

## 2024-01-06 PROCEDURE — 88305 TISSUE EXAM BY PATHOLOGIST: CPT | Performed by: STUDENT IN AN ORGANIZED HEALTH CARE EDUCATION/TRAINING PROGRAM

## 2024-01-06 PROCEDURE — 80053 COMPREHEN METABOLIC PANEL: CPT | Performed by: HOSPITALIST

## 2024-01-06 PROCEDURE — 63600175 PHARM REV CODE 636 W HCPCS: Performed by: HOSPITALIST

## 2024-01-06 PROCEDURE — 37000008 HC ANESTHESIA 1ST 15 MINUTES: Performed by: ORTHOPAEDIC SURGERY

## 2024-01-06 PROCEDURE — 87077 CULTURE AEROBIC IDENTIFY: CPT | Performed by: ORTHOPAEDIC SURGERY

## 2024-01-06 PROCEDURE — 83735 ASSAY OF MAGNESIUM: CPT | Performed by: HOSPITALIST

## 2024-01-06 PROCEDURE — 87186 SC STD MICRODIL/AGAR DIL: CPT | Performed by: ORTHOPAEDIC SURGERY

## 2024-01-06 PROCEDURE — 82728 ASSAY OF FERRITIN: CPT | Performed by: HOSPITALIST

## 2024-01-06 PROCEDURE — 0SB90ZZ EXCISION OF RIGHT HIP JOINT, OPEN APPROACH: ICD-10-PCS | Performed by: ORTHOPAEDIC SURGERY

## 2024-01-06 PROCEDURE — C1713 ANCHOR/SCREW BN/BN,TIS/BN: HCPCS | Performed by: ORTHOPAEDIC SURGERY

## 2024-01-06 PROCEDURE — 80202 ASSAY OF VANCOMYCIN: CPT | Performed by: HOSPITALIST

## 2024-01-06 PROCEDURE — 99223 1ST HOSP IP/OBS HIGH 75: CPT | Mod: 57,,, | Performed by: ORTHOPAEDIC SURGERY

## 2024-01-06 PROCEDURE — 71000015 HC POSTOP RECOV 1ST HR: Performed by: ORTHOPAEDIC SURGERY

## 2024-01-06 PROCEDURE — 83540 ASSAY OF IRON: CPT | Performed by: HOSPITALIST

## 2024-01-06 DEVICE — KIT GRAFT BONE/SUB STIM 10ML: Type: IMPLANTABLE DEVICE | Site: HIP | Status: FUNCTIONAL

## 2024-01-06 RX ORDER — TOBRAMYCIN 40 MG/ML
INJECTION INTRAMUSCULAR; INTRAVENOUS
Status: DISCONTINUED | OUTPATIENT
Start: 2024-01-06 | End: 2024-01-06 | Stop reason: HOSPADM

## 2024-01-06 RX ORDER — MORPHINE SULFATE 2 MG/ML
4 INJECTION, SOLUTION INTRAMUSCULAR; INTRAVENOUS EVERY 4 HOURS PRN
Status: DISCONTINUED | OUTPATIENT
Start: 2024-01-06 | End: 2024-01-06

## 2024-01-06 RX ORDER — LORAZEPAM 2 MG/ML
1 INJECTION INTRAMUSCULAR EVERY 4 HOURS PRN
Status: DISCONTINUED | OUTPATIENT
Start: 2024-01-06 | End: 2024-01-12 | Stop reason: HOSPADM

## 2024-01-06 RX ORDER — TRAZODONE HYDROCHLORIDE 100 MG/1
100 TABLET ORAL NIGHTLY PRN
Status: DISCONTINUED | OUTPATIENT
Start: 2024-01-06 | End: 2024-01-12 | Stop reason: HOSPADM

## 2024-01-06 RX ORDER — ONDANSETRON HYDROCHLORIDE 2 MG/ML
4 INJECTION, SOLUTION INTRAVENOUS EVERY 8 HOURS PRN
Status: DISCONTINUED | OUTPATIENT
Start: 2024-01-06 | End: 2024-01-12 | Stop reason: HOSPADM

## 2024-01-06 RX ORDER — THIAMINE HCL 100 MG
100 TABLET ORAL DAILY
Status: DISCONTINUED | OUTPATIENT
Start: 2024-01-06 | End: 2024-01-12 | Stop reason: HOSPADM

## 2024-01-06 RX ORDER — DEXAMETHASONE SODIUM PHOSPHATE 4 MG/ML
INJECTION, SOLUTION INTRA-ARTICULAR; INTRALESIONAL; INTRAMUSCULAR; INTRAVENOUS; SOFT TISSUE
Status: DISCONTINUED | OUTPATIENT
Start: 2024-01-06 | End: 2024-01-06

## 2024-01-06 RX ORDER — SODIUM CHLORIDE 0.9 % (FLUSH) 0.9 %
10 SYRINGE (ML) INJECTION EVERY 12 HOURS PRN
Status: DISCONTINUED | OUTPATIENT
Start: 2024-01-06 | End: 2024-01-12 | Stop reason: HOSPADM

## 2024-01-06 RX ORDER — MUPIROCIN 20 MG/G
OINTMENT TOPICAL
Status: DISCONTINUED | OUTPATIENT
Start: 2024-01-06 | End: 2024-01-06 | Stop reason: HOSPADM

## 2024-01-06 RX ORDER — OXYCODONE HYDROCHLORIDE 10 MG/1
10 TABLET ORAL EVERY 6 HOURS PRN
Status: DISCONTINUED | OUTPATIENT
Start: 2024-01-06 | End: 2024-01-12 | Stop reason: HOSPADM

## 2024-01-06 RX ORDER — MIDAZOLAM HYDROCHLORIDE 1 MG/ML
INJECTION, SOLUTION INTRAMUSCULAR; INTRAVENOUS
Status: DISCONTINUED | OUTPATIENT
Start: 2024-01-06 | End: 2024-01-06

## 2024-01-06 RX ORDER — LIDOCAINE HYDROCHLORIDE 20 MG/ML
INJECTION INTRAVENOUS
Status: DISCONTINUED | OUTPATIENT
Start: 2024-01-06 | End: 2024-01-06

## 2024-01-06 RX ORDER — SODIUM CHLORIDE 0.9 % (FLUSH) 0.9 %
10 SYRINGE (ML) INJECTION
Status: DISCONTINUED | OUTPATIENT
Start: 2024-01-06 | End: 2024-01-12 | Stop reason: HOSPADM

## 2024-01-06 RX ORDER — VANCOMYCIN HYDROCHLORIDE 500 MG/10ML
INJECTION, POWDER, LYOPHILIZED, FOR SOLUTION INTRAVENOUS
Status: DISCONTINUED | OUTPATIENT
Start: 2024-01-06 | End: 2024-01-06 | Stop reason: HOSPADM

## 2024-01-06 RX ORDER — IBUPROFEN 200 MG
16 TABLET ORAL
Status: DISCONTINUED | OUTPATIENT
Start: 2024-01-06 | End: 2024-01-12 | Stop reason: HOSPADM

## 2024-01-06 RX ORDER — KETOROLAC TROMETHAMINE 30 MG/ML
15 INJECTION, SOLUTION INTRAMUSCULAR; INTRAVENOUS EVERY 6 HOURS PRN
Status: DISCONTINUED | OUTPATIENT
Start: 2024-01-06 | End: 2024-01-06

## 2024-01-06 RX ORDER — FOLIC ACID 1 MG/1
1 TABLET ORAL DAILY
Status: DISCONTINUED | OUTPATIENT
Start: 2024-01-06 | End: 2024-01-12 | Stop reason: HOSPADM

## 2024-01-06 RX ORDER — PROPOFOL 10 MG/ML
VIAL (ML) INTRAVENOUS
Status: DISCONTINUED | OUTPATIENT
Start: 2024-01-06 | End: 2024-01-06

## 2024-01-06 RX ORDER — GLUCAGON 1 MG
1 KIT INJECTION
Status: DISCONTINUED | OUTPATIENT
Start: 2024-01-06 | End: 2024-01-12 | Stop reason: HOSPADM

## 2024-01-06 RX ORDER — PREGABALIN 75 MG/1
75 CAPSULE ORAL 2 TIMES DAILY
Status: DISCONTINUED | OUTPATIENT
Start: 2024-01-06 | End: 2024-01-12 | Stop reason: HOSPADM

## 2024-01-06 RX ORDER — CLONIDINE HYDROCHLORIDE 0.1 MG/1
0.1 TABLET ORAL EVERY 8 HOURS PRN
Status: DISCONTINUED | OUTPATIENT
Start: 2024-01-06 | End: 2024-01-12 | Stop reason: HOSPADM

## 2024-01-06 RX ORDER — CYCLOBENZAPRINE HCL 5 MG
5 TABLET ORAL EVERY 8 HOURS PRN
Status: DISCONTINUED | OUTPATIENT
Start: 2024-01-06 | End: 2024-01-06

## 2024-01-06 RX ORDER — IBUPROFEN 200 MG
24 TABLET ORAL
Status: DISCONTINUED | OUTPATIENT
Start: 2024-01-06 | End: 2024-01-12 | Stop reason: HOSPADM

## 2024-01-06 RX ORDER — DEXMEDETOMIDINE HYDROCHLORIDE 100 UG/ML
INJECTION, SOLUTION INTRAVENOUS
Status: DISCONTINUED | OUTPATIENT
Start: 2024-01-06 | End: 2024-01-06

## 2024-01-06 RX ORDER — ACETAMINOPHEN 325 MG/1
650 TABLET ORAL EVERY 4 HOURS PRN
Status: DISCONTINUED | OUTPATIENT
Start: 2024-01-06 | End: 2024-01-06

## 2024-01-06 RX ORDER — MUPIROCIN 20 MG/G
OINTMENT TOPICAL
Status: DISPENSED
Start: 2024-01-06 | End: 2024-01-06

## 2024-01-06 RX ORDER — METHOCARBAMOL 750 MG/1
750 TABLET, FILM COATED ORAL 3 TIMES DAILY
Status: DISCONTINUED | OUTPATIENT
Start: 2024-01-06 | End: 2024-01-12 | Stop reason: HOSPADM

## 2024-01-06 RX ORDER — HYDROXYZINE HYDROCHLORIDE 25 MG/1
50 TABLET, FILM COATED ORAL EVERY 6 HOURS PRN
Status: DISCONTINUED | OUTPATIENT
Start: 2024-01-06 | End: 2024-01-12 | Stop reason: HOSPADM

## 2024-01-06 RX ORDER — NALOXONE HCL 0.4 MG/ML
0.02 VIAL (ML) INJECTION
Status: DISCONTINUED | OUTPATIENT
Start: 2024-01-06 | End: 2024-01-12 | Stop reason: HOSPADM

## 2024-01-06 RX ORDER — FENTANYL CITRATE 50 UG/ML
INJECTION, SOLUTION INTRAMUSCULAR; INTRAVENOUS
Status: DISCONTINUED | OUTPATIENT
Start: 2024-01-06 | End: 2024-01-06

## 2024-01-06 RX ORDER — METHOCARBAMOL 500 MG/1
500 TABLET, FILM COATED ORAL 4 TIMES DAILY PRN
Status: DISCONTINUED | OUTPATIENT
Start: 2024-01-06 | End: 2024-01-06

## 2024-01-06 RX ORDER — ONDANSETRON 2 MG/ML
INJECTION INTRAMUSCULAR; INTRAVENOUS
Status: DISCONTINUED | OUTPATIENT
Start: 2024-01-06 | End: 2024-01-06

## 2024-01-06 RX ORDER — OXYCODONE HYDROCHLORIDE 5 MG/1
5 TABLET ORAL EVERY 6 HOURS PRN
Status: DISCONTINUED | OUTPATIENT
Start: 2024-01-06 | End: 2024-01-12 | Stop reason: HOSPADM

## 2024-01-06 RX ORDER — KETAMINE HCL IN 0.9 % NACL 50 MG/5 ML
SYRINGE (ML) INTRAVENOUS
Status: DISCONTINUED | OUTPATIENT
Start: 2024-01-06 | End: 2024-01-06

## 2024-01-06 RX ORDER — DEXTROSE MONOHYDRATE AND SODIUM CHLORIDE 5; .45 G/100ML; G/100ML
INJECTION, SOLUTION INTRAVENOUS CONTINUOUS
Status: DISCONTINUED | OUTPATIENT
Start: 2024-01-06 | End: 2024-01-06

## 2024-01-06 RX ORDER — ACETAMINOPHEN 500 MG
1000 TABLET ORAL EVERY 8 HOURS
Status: DISCONTINUED | OUTPATIENT
Start: 2024-01-06 | End: 2024-01-12 | Stop reason: HOSPADM

## 2024-01-06 RX ORDER — AMOXICILLIN 250 MG
2 CAPSULE ORAL 2 TIMES DAILY PRN
Status: DISCONTINUED | OUTPATIENT
Start: 2024-01-06 | End: 2024-01-12 | Stop reason: HOSPADM

## 2024-01-06 RX ORDER — LOPERAMIDE HYDROCHLORIDE 2 MG/1
2 CAPSULE ORAL
Status: DISCONTINUED | OUTPATIENT
Start: 2024-01-06 | End: 2024-01-12 | Stop reason: HOSPADM

## 2024-01-06 RX ORDER — ACETAMINOPHEN 10 MG/ML
INJECTION, SOLUTION INTRAVENOUS
Status: DISCONTINUED | OUTPATIENT
Start: 2024-01-06 | End: 2024-01-06

## 2024-01-06 RX ORDER — HYDROMORPHONE HYDROCHLORIDE 1 MG/ML
0.5 INJECTION, SOLUTION INTRAMUSCULAR; INTRAVENOUS; SUBCUTANEOUS EVERY 5 MIN PRN
Status: DISCONTINUED | OUTPATIENT
Start: 2024-01-06 | End: 2024-01-06 | Stop reason: HOSPADM

## 2024-01-06 RX ORDER — ROCURONIUM BROMIDE 10 MG/ML
INJECTION, SOLUTION INTRAVENOUS
Status: DISCONTINUED | OUTPATIENT
Start: 2024-01-06 | End: 2024-01-06

## 2024-01-06 RX ORDER — HYDROMORPHONE HYDROCHLORIDE 1 MG/ML
0.2 INJECTION, SOLUTION INTRAMUSCULAR; INTRAVENOUS; SUBCUTANEOUS EVERY 5 MIN PRN
Status: DISCONTINUED | OUTPATIENT
Start: 2024-01-06 | End: 2024-01-06

## 2024-01-06 RX ORDER — ALUMINUM HYDROXIDE, MAGNESIUM HYDROXIDE, AND SIMETHICONE 1200; 120; 1200 MG/30ML; MG/30ML; MG/30ML
30 SUSPENSION ORAL 4 TIMES DAILY PRN
Status: DISCONTINUED | OUTPATIENT
Start: 2024-01-06 | End: 2024-01-12 | Stop reason: HOSPADM

## 2024-01-06 RX ADMIN — PIPERACILLIN SODIUM AND TAZOBACTAM SODIUM 4.5 G: 4; .5 INJECTION, POWDER, FOR SOLUTION INTRAVENOUS at 10:01

## 2024-01-06 RX ADMIN — DEXAMETHASONE SODIUM PHOSPHATE 8 MG: 4 INJECTION, SOLUTION INTRAMUSCULAR; INTRAVENOUS at 08:01

## 2024-01-06 RX ADMIN — SODIUM CHLORIDE: 0.9 INJECTION, SOLUTION INTRAVENOUS at 07:01

## 2024-01-06 RX ADMIN — PREGABALIN 75 MG: 50 CAPSULE ORAL at 08:01

## 2024-01-06 RX ADMIN — PIPERACILLIN SODIUM AND TAZOBACTAM SODIUM 4.5 G: 4; .5 INJECTION, POWDER, FOR SOLUTION INTRAVENOUS at 06:01

## 2024-01-06 RX ADMIN — Medication 20 MG: at 08:01

## 2024-01-06 RX ADMIN — HYDROMORPHONE HYDROCHLORIDE 0.5 MG: 1 INJECTION, SOLUTION INTRAMUSCULAR; INTRAVENOUS; SUBCUTANEOUS at 09:01

## 2024-01-06 RX ADMIN — LIDOCAINE HYDROCHLORIDE 100 MG: 20 INJECTION INTRAVENOUS at 07:01

## 2024-01-06 RX ADMIN — OXYCODONE HYDROCHLORIDE 10 MG: 10 TABLET ORAL at 09:01

## 2024-01-06 RX ADMIN — OXYCODONE HYDROCHLORIDE 10 MG: 10 TABLET ORAL at 08:01

## 2024-01-06 RX ADMIN — VANCOMYCIN HYDROCHLORIDE 1000 MG: 1 INJECTION, POWDER, LYOPHILIZED, FOR SOLUTION INTRAVENOUS at 02:01

## 2024-01-06 RX ADMIN — HYDROMORPHONE HYDROCHLORIDE 0.5 MG: 1 INJECTION, SOLUTION INTRAMUSCULAR; INTRAVENOUS; SUBCUTANEOUS at 10:01

## 2024-01-06 RX ADMIN — DEXTROSE AND SODIUM CHLORIDE: 5; 450 INJECTION, SOLUTION INTRAVENOUS at 01:01

## 2024-01-06 RX ADMIN — ACETAMINOPHEN 1000 MG: 10 INJECTION, SOLUTION INTRAVENOUS at 08:01

## 2024-01-06 RX ADMIN — MUPIROCIN: 20 OINTMENT TOPICAL at 07:01

## 2024-01-06 RX ADMIN — CEFAZOLIN 2 G: 2 INJECTION, POWDER, FOR SOLUTION INTRAMUSCULAR; INTRAVENOUS at 08:01

## 2024-01-06 RX ADMIN — ONDANSETRON 4 MG: 2 INJECTION INTRAMUSCULAR; INTRAVENOUS at 08:01

## 2024-01-06 RX ADMIN — FENTANYL CITRATE 50 MCG: 50 INJECTION, SOLUTION INTRAMUSCULAR; INTRAVENOUS at 08:01

## 2024-01-06 RX ADMIN — PROPOFOL 150 MG: 10 INJECTION, EMULSION INTRAVENOUS at 07:01

## 2024-01-06 RX ADMIN — Medication 30 MG: at 08:01

## 2024-01-06 RX ADMIN — FENTANYL CITRATE 50 MCG: 50 INJECTION, SOLUTION INTRAMUSCULAR; INTRAVENOUS at 09:01

## 2024-01-06 RX ADMIN — METHOCARBAMOL 750 MG: 750 TABLET ORAL at 02:01

## 2024-01-06 RX ADMIN — ROCURONIUM BROMIDE 50 MG: 10 INJECTION, SOLUTION INTRAVENOUS at 07:01

## 2024-01-06 RX ADMIN — METHOCARBAMOL 750 MG: 750 TABLET ORAL at 08:01

## 2024-01-06 RX ADMIN — SUGAMMADEX 200 MG: 100 INJECTION, SOLUTION INTRAVENOUS at 09:01

## 2024-01-06 RX ADMIN — Medication 100 MG: at 10:01

## 2024-01-06 RX ADMIN — FENTANYL CITRATE 50 MCG: 50 INJECTION, SOLUTION INTRAMUSCULAR; INTRAVENOUS at 07:01

## 2024-01-06 RX ADMIN — FOLIC ACID 1 MG: 1 TABLET ORAL at 09:01

## 2024-01-06 RX ADMIN — DEXMEDETOMIDINE 6 MCG: 100 INJECTION, SOLUTION, CONCENTRATE INTRAVENOUS at 08:01

## 2024-01-06 RX ADMIN — ACETAMINOPHEN 1000 MG: 325 TABLET ORAL at 04:01

## 2024-01-06 RX ADMIN — PREGABALIN 75 MG: 50 CAPSULE ORAL at 09:01

## 2024-01-06 RX ADMIN — MIDAZOLAM HYDROCHLORIDE 2 MG: 1 INJECTION, SOLUTION INTRAMUSCULAR; INTRAVENOUS at 07:01

## 2024-01-06 NOTE — SUBJECTIVE & OBJECTIVE
Past Medical History:   Diagnosis Date    Hypertension        No past surgical history on file.    Review of patient's allergies indicates:  No Known Allergies    No current facility-administered medications on file prior to encounter.     Current Outpatient Medications on File Prior to Encounter   Medication Sig    mirtazapine (REMERON) 30 MG tablet Take 30 mg by mouth.    phenytoin (DILANTIN) 100 MG ER capsule Take 100 mg by mouth.    traZODone (DESYREL) 100 MG tablet Take 100 mg by mouth every evening.     Family History    None       Tobacco Use    Smoking status: Never    Smokeless tobacco: Never   Substance and Sexual Activity    Alcohol use: Never    Drug use: Never    Sexual activity: Not on file     Review of Systems   Constitutional:  Positive for fatigue. Negative for activity change, appetite change, chills, diaphoresis, fever and unexpected weight change.   HENT:  Negative for congestion, dental problem, drooling, ear discharge, ear pain, facial swelling, hearing loss, mouth sores, nosebleeds, postnasal drip, rhinorrhea, sinus pressure, sneezing, sore throat, tinnitus, trouble swallowing and voice change.    Eyes:  Negative for photophobia, pain, discharge, redness, itching and visual disturbance.   Respiratory:  Negative for apnea, cough, choking, chest tightness, shortness of breath, wheezing and stridor.    Cardiovascular:  Negative for chest pain, palpitations and leg swelling.   Gastrointestinal:  Negative for abdominal distention, abdominal pain, anal bleeding, blood in stool, constipation, diarrhea, nausea, rectal pain and vomiting.   Endocrine: Negative for cold intolerance, heat intolerance, polydipsia, polyphagia and polyuria.   Genitourinary:  Negative for decreased urine volume, difficulty urinating, dysuria, enuresis, flank pain, frequency, genital sores, hematuria, penile discharge, penile pain, penile swelling, scrotal swelling, testicular pain and urgency.   Musculoskeletal:  Positive  for arthralgias (right hip pain) and joint swelling (R hip swelling). Negative for back pain, gait problem, myalgias, neck pain and neck stiffness.   Skin:  Positive for wound (chronic wound over bilateral upper and lower extremities w/o drainage). Negative for color change, pallor and rash.   Allergic/Immunologic: Negative for environmental allergies, food allergies and immunocompromised state.   Neurological:  Negative for dizziness, tremors, seizures, syncope, facial asymmetry, speech difficulty, weakness, light-headedness, numbness and headaches.   Hematological:  Negative for adenopathy. Does not bruise/bleed easily.   Psychiatric/Behavioral:  Negative for agitation, behavioral problems, confusion, decreased concentration, dysphoric mood, hallucinations, self-injury, sleep disturbance and suicidal ideas. The patient is nervous/anxious. The patient is not hyperactive.      Objective:     Vital Signs (Most Recent):  Temp: 99.2 °F (37.3 °C) (01/05/24 1805)  Pulse: 77 (01/05/24 1805)  Resp: 16 (01/05/24 2330)  BP: 113/63 (01/05/24 1805)  SpO2: 99 % (01/05/24 1805) Vital Signs (24h Range):  Temp:  [99.2 °F (37.3 °C)] 99.2 °F (37.3 °C)  Pulse:  [77] 77  Resp:  [15-16] 16  SpO2:  [99 %] 99 %  BP: (113)/(63) 113/63     Weight: 70.3 kg (155 lb)  Body mass index is 25.79 kg/m².     Physical Exam  Constitutional:       General: He is not in acute distress.     Appearance: He is well-developed. He is ill-appearing. He is not diaphoretic.   HENT:      Head: Normocephalic and atraumatic.      Nose: Nose normal.      Mouth/Throat:      Pharynx: No oropharyngeal exudate.   Eyes:      General: No scleral icterus.     Conjunctiva/sclera: Conjunctivae normal.      Pupils: Pupils are equal, round, and reactive to light.   Neck:      Thyroid: No thyromegaly.      Vascular: No JVD.      Trachea: No tracheal deviation.   Cardiovascular:      Rate and Rhythm: Normal rate and regular rhythm.      Heart sounds: Normal heart sounds. No  murmur heard.  Pulmonary:      Effort: Pulmonary effort is normal. No respiratory distress.      Breath sounds: Normal breath sounds. No wheezing or rales.   Chest:      Chest wall: No tenderness.   Abdominal:      General: Bowel sounds are normal. There is no distension.      Palpations: Abdomen is soft. There is no mass.      Tenderness: There is no abdominal tenderness. There is no guarding or rebound.   Musculoskeletal:         General: Swelling (mild swelling over R hip) and tenderness (TTP over R hip with limited ROM due to pain) present.      Cervical back: Normal range of motion and neck supple.      Comments: Limited ROM of R LE due to hip pain    Lymphadenopathy:      Cervical: No cervical adenopathy.   Skin:     General: Skin is warm and dry.      Findings: Lesion (large are of chronic wounds over bilateral upper and lower extremities w/o eschar or drainage (see pictures under media section)) present. No erythema or rash.   Neurological:      Mental Status: He is alert and oriented to person, place, and time.      Cranial Nerves: No cranial nerve deficit.      Motor: No abnormal muscle tone.      Coordination: Coordination normal.      Deep Tendon Reflexes: Reflexes are normal and symmetric. Reflexes normal.   Psychiatric:         Thought Content: Thought content normal.         Judgment: Judgment normal.            CRANIAL NERVES     CN III, IV, VI   Pupils are equal, round, and reactive to light.       Significant Labs: All pertinent labs within the past 24 hours have been reviewed.  Recent Lab Results         01/05/24 2017        Albumin 2.7              ALT 19       Anion Gap 12       AST 26  Comment: *Result may be interfered by visible hemolysis       Baso # 0.02       Basophil % 0.3       BILIRUBIN TOTAL 0.3  Comment: For infants and newborns, interpretation of results should be based  on gestational age, weight and in agreement with clinical  observations.    Premature Infant recommended  reference ranges:  Up to 24 hours.............<8.0 mg/dL  Up to 48 hours............<12.0 mg/dL  3-5 days..................<15.0 mg/dL  6-29 days.................<15.0 mg/dL         BUN 13       Calcium 9.7       Chloride 98       CO2 23       Creatinine 0.8       .9       Differential Method Automated       eGFR >60.0       Eos # 0.1       Eosinophil % 0.9       Glucose 85       Gran # (ANC) 5.5       Gran % 71.9       Hematocrit 32.6       Hemoglobin 9.5       Immature Grans (Abs) 0.03  Comment: Mild elevation in immature granulocytes is non specific and   can be seen in a variety of conditions including stress response,   acute inflammation, trauma and pregnancy. Correlation with other   laboratory and clinical findings is essential.         Immature Granulocytes 0.4       Lymph # 1.6       Lymph % 20.7       Magnesium  2.0       MCH 20.4       MCHC 29.1       MCV 70       Mono # 0.4       Mono % 5.8       MPV 8.2       nRBC 0       Platelet Count 592       Potassium 4.8       PROTEIN TOTAL 10.4       RBC 4.66       RDW 18.1       Sodium 133       WBC 7.65               Significant Imaging: I have reviewed all pertinent imaging results/findings within the past 24 hours.

## 2024-01-06 NOTE — PLAN OF CARE
"Seen in PACU with family at bedside. Presented with R hip pain in clinic and MRi showing R hip osteomyelitis of right acetabulum and ilium/advanced septic arthritis with phlegmon/myositis and intramuscular abscess seen. Admitted overnight and taken to the OR today with ortho for I and D and arthrotomy with murky fluid seen and CX taken.  Blood CX from 1/5 - update- with 1/6 with staph, repeat ordered at 3 pm.    Hg 8.1 with MCV 70 and iron/b12/folate checked overnight and wnl, likely anemia of chronic inflammation given chronic infection with hip infection as well as chronic UE wounds throughout forearms. Wound care consulted, family reports at time have yellow drainage but deny purulence from hip or forearms. He denies currently injecting drugs into arm wounds but sequelae of previous injections as cause it appears with long term cocaine/heroin use as well as tobacco and alcohol use as well. He denies any issues with withdrawal, says its been "a long time" since he's had issues with that and on protocols for monitoring.  Ortho reports advanced destruction of joint and would not be a candidate for hip replaceent long term unless can stay abstinent from drugs per notes.  Pain not controlled at home on just ibuprofen and multimodals for pain on board, cautious with hx of drug use but has need for acute pain control at this time with acute medical issues as well.   Hx of hep C with VL 1.17 million at [facility] [place] as has been planning for treatment with workups, VL here pending. Will refer to hep C clinic as can do virtual visit if stays abstinent of drugs while at presumed LTAC that he will likely be needing once medically stable as we discussed likely will need antibiotics for 6-8 weeks and we discussed that will likely need LTAC for this ater hospital stay and he voiced understanding.    "

## 2024-01-06 NOTE — SUBJECTIVE & OBJECTIVE
Past Medical History:   Diagnosis Date    Hypertension        History reviewed. No pertinent surgical history.    Review of patient's allergies indicates:  No Known Allergies    Medications:  Medications Prior to Admission   Medication Sig    traZODone (DESYREL) 100 MG tablet Take 100 mg by mouth every evening.    mirtazapine (REMERON) 30 MG tablet Take 30 mg by mouth.    phenytoin (DILANTIN) 100 MG ER capsule Take 100 mg by mouth.     Antibiotics (From admission, onward)      Start     Stop Route Frequency Ordered    01/06/24 1145  vancomycin (VANCOCIN) 1,000 mg in dextrose 5 % (D5W) 250 mL IVPB (Vial-Mate)         -- IV Every 12 hours (non-standard times) 01/06/24 1138    01/06/24 1045  piperacillin-tazobactam (ZOSYN) 4.5 g in dextrose 5 % in water (D5W) 100 mL IVPB (MB+)         -- IV Every 8 hours (non-standard times) 01/06/24 0933    01/06/24 1032  vancomycin - pharmacy to dose  (vancomycin IVPB (PEDS and ADULTS))        See Hyperspace for full Linked Orders Report.    -- IV pharmacy to manage frequency 01/06/24 0932    01/06/24 0723  mupirocin (BACTROBAN) 2 % ointment        Note to Pharmacy: Created by cabinet override    01/06/24 1929   01/06/24 0723          Antifungals (From admission, onward)      None          Antivirals (From admission, onward)      None             Immunization History   Administered Date(s) Administered    COVID-19, MRNA, LN-S, PF (Pfizer) (Purple Cap) 03/13/2021, 04/03/2021, 10/13/2021    COVID-19, mRNA, LNP-S, bivalent booster, PF (PFIZER OMICRON) 11/16/2022    Influenza - Quadrivalent - PF *Preferred* (6 months and older) 09/24/2022    Tdap 07/18/2017       Family History    None       Social History     Socioeconomic History    Marital status:    Tobacco Use    Smoking status: Never    Smokeless tobacco: Never   Substance and Sexual Activity    Alcohol use: Never    Drug use: Never     Review of Systems   Constitutional:  Negative for chills, diaphoresis and fever.   HENT:   Negative for congestion, mouth sores, sore throat and trouble swallowing.    Eyes:  Negative for pain.   Respiratory:  Negative for cough and shortness of breath.    Cardiovascular:  Negative for chest pain and leg swelling.   Gastrointestinal:  Negative for abdominal distention, abdominal pain, diarrhea, nausea and vomiting.   Genitourinary:  Negative for decreased urine volume, difficulty urinating, dysuria and flank pain.   Musculoskeletal:  Positive for arthralgias. Negative for back pain, joint swelling and neck pain.   Skin:  Positive for wound. Negative for color change and rash.   Neurological:  Negative for seizures and syncope.   Psychiatric/Behavioral:  Negative for confusion.      Objective:     Vital Signs (Most Recent):  Temp: 97.6 °F (36.4 °C) (01/06/24 1200)  Pulse: 63 (01/06/24 1200)  Resp: 16 (01/06/24 1200)  BP: 120/74 (01/06/24 1200)  SpO2: 97 % (01/06/24 1200) Vital Signs (24h Range):  Temp:  [97.6 °F (36.4 °C)-99.6 °F (37.6 °C)] 97.6 °F (36.4 °C)  Pulse:  [63-84] 63  Resp:  [8-26] 16  SpO2:  [96 %-100 %] 97 %  BP: (103-136)/(56-87) 120/74     Weight: 71.2 kg (156 lb 15.5 oz)  Body mass index is 26.12 kg/m².    Estimated Creatinine Clearance: 99.3 mL/min (based on SCr of 0.8 mg/dL).     Physical Exam  Vitals and nursing note reviewed.   Constitutional:       General: He is not in acute distress.     Appearance: He is ill-appearing (chronically). He is not toxic-appearing or diaphoretic.   HENT:      Nose: Nose normal. No congestion.      Mouth/Throat:      Mouth: Mucous membranes are moist.      Pharynx: Oropharynx is clear.   Eyes:      General: No scleral icterus.     Conjunctiva/sclera: Conjunctivae normal.   Cardiovascular:      Rate and Rhythm: Normal rate.      Heart sounds: Normal heart sounds. No murmur heard.  Pulmonary:      Effort: Pulmonary effort is normal. No respiratory distress.      Breath sounds: Normal breath sounds.   Abdominal:      General: Abdomen is flat. Bowel sounds are  normal. There is no distension.      Palpations: Abdomen is soft.      Tenderness: There is no abdominal tenderness.   Musculoskeletal:         General: Swelling and tenderness present.      Cervical back: Normal range of motion. No rigidity or tenderness.      Right lower leg: No edema.      Left lower leg: No edema.      Comments: R hip / thigh pain, appropriate post-op. No redness, warmth, or fluctuance. Surgical drain in place; draining SS/bloody fluid.    Skin:     General: Skin is warm and dry.      Coloration: Skin is not jaundiced.      Findings: Erythema and lesion present. No rash.   Neurological:      Mental Status: He is alert and oriented to person, place, and time. Mental status is at baseline.   Psychiatric:         Behavior: Behavior normal.         Thought Content: Thought content normal.                    Significant Labs: Blood Culture:   Recent Labs   Lab 01/05/24 2017 01/05/24 2031   LABBLOO Gram stain derrick bottle: Gram positive cocci in clusters resembling Staph  Results called to and read back by: Mahi Mckeon LPN 01/06/2024  14:52  No Growth to date No Growth to date     CBC:   Recent Labs   Lab 01/05/24 2017 01/06/24  0351   WBC 7.65 6.03   HGB 9.5* 8.1*   HCT 32.6* 28.2*   * 502*     CMP:   Recent Labs   Lab 01/05/24 2017 01/06/24  0351   * 133*   K 4.8 3.7   CL 98 100   CO2 23 23   GLU 85 101   BUN 13 14   CREATININE 0.8 0.8   CALCIUM 9.7 9.3   PROT 10.4* 8.7*   ALBUMIN 2.7* 2.3*   BILITOT 0.3 0.4   ALKPHOS 109 98   AST 26 20   ALT 19 15   ANIONGAP 12 10     Microbiology Results (last 7 days)       Procedure Component Value Units Date/Time    Blood culture [[id number]]     Order Status: Sent Specimen: Blood     Blood culture [[id number]]     Order Status: Sent Specimen: Blood     Rapid Organism ID by PCR (from Blood culture) [[id number]] Collected: 01/05/24 2017    Order Status: No result Updated: 01/06/24 1454    Blood culture #2 **CANNOT BE ORDERED STAT**  [[id number]] Collected: 01/05/24 2017    Order Status: Completed Specimen: Blood from Peripheral, Forearm, Right Updated: 01/06/24 1453     Blood Culture, Routine Gram stain derrick bottle: Gram positive cocci in clusters resembling Staph      Results called to and read back by: Mahi Mckeon LPN 01/06/2024  14:52    Tissue culture [[id number]] Collected: 01/06/24 0851    Order Status: Completed Specimen: Tissue from Hip, Right Updated: 01/06/24 1315     Gram Stain Result No WBC's      No organisms seen    Tissue culture [[id number]] Collected: 01/06/24 0851    Order Status: Completed Specimen: Tissue from Hip, Right Updated: 01/06/24 1149     Gram Stain Result Many WBC's      Rare Gram positive cocci    Tissue culture [[id number]] Collected: 01/06/24 0851    Order Status: Completed Specimen: Tissue from Hip, Right Updated: 01/06/24 1146     Gram Stain Result Moderate WBC's      No organisms seen    Culture, Anaerobe [[id number]] Collected: 01/06/24 0851    Order Status: Sent Specimen: Incision site from Hip, Right Updated: 01/06/24 0912    Culture, Anaerobe [[id number]] Collected: 01/06/24 0851    Order Status: Sent Specimen: Incision site from Hip, Right Updated: 01/06/24 0912    Culture, Anaerobe [[id number]] Collected: 01/06/24 0851    Order Status: Sent Specimen: Incision site from Hip, Right Updated: 01/06/24 0912    Fungus culture [[id number]] Collected: 01/06/24 0851    Order Status: Sent Specimen: Incision site from Hip, Right Updated: 01/06/24 0909    Fungus culture [[id number]] Collected: 01/06/24 0851    Order Status: Sent Specimen: Incision site from Hip, Right Updated: 01/06/24 0909    Fungus culture [[id number]] Collected: 01/06/24 0851    Order Status: Sent Specimen: Incision site from Hip, Right Updated: 01/06/24 0908    Aerobic culture [[id number]] Collected: 01/06/24 0851    Order Status: Canceled Specimen: Incision site from Hip, Right     Aerobic culture [[id number]] Collected: 01/06/24 0851     "Order Status: Canceled Specimen: Incision site from Hip, Right     Aerobic culture [[id number]] Collected: 01/06/24 0851    Order Status: Canceled Specimen: Incision site from Hip, Right     Blood culture #1 **CANNOT BE ORDERED STAT** [[id number]] Collected: 01/05/24 2031    Order Status: Completed Specimen: Blood from Peripheral, Antecubital, Left Updated: 01/06/24 0315     Blood Culture, Routine No Growth to date    Blood culture [[id number]] Collected: 01/05/24 2017    Order Status: Completed Specimen: Blood from Peripheral, Forearm, Right Updated: 01/06/24 0315     Blood Culture, Routine No Growth to date          Pathology Results  (Last 10 years)      None          Wound Culture: No results for input(s): "LABAERO" in the last 4320 hours.  All pertinent labs within the past 24 hours have been reviewed.    Significant Imaging: I have reviewed all pertinent imaging results/findings within the past 24 hours.    I have personally reviewed records / hospital notes from   service and other specialty providers. I have also reviewed CBC, CMP/BMP,  cultures and imaging with my interpretation as documented in my assessment / plan.    Patient is high risk for infectious complications given pt's age, multiple co-morbidities, and case complexity.      Time: 75 minutes   50% of time spent on face-to-face counseling and coordination of care. Counseling included review of test results, diagnosis, and treatment plan with patient and/or family.      "

## 2024-01-06 NOTE — ASSESSMENT & PLAN NOTE
Barrington Amin is a 48 y.o. male with PMH significant for IVDU presenting with right hip pain and concern for septic arthritis secondary to osteomyelitis in the right acetabulum.  Based on the appearance of the patient's pelvic x-ray/MRI along with clinical correlation, patient would benefit from surgical intervention with a washout/total hip replacement.  However, given the fact that the patient is actively using IV drugs, can only off with a washout at this time. Had discussion with the patient regarding benefits of a washout surgical procedure, and he is amenable.     - NPO  - Plan for OR this morning, patient marked, booked, and consented  - Hold Abx until intraoperative cultures taken  - Labs: WBC 7.65, Hgb 9.5, HCT 32.6, , GLU 85, Cr 0.8, .9  - WBAT
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
## 2024-01-06 NOTE — ASSESSMENT & PLAN NOTE
-patient follows at [facility] for health maintenance and to start on treatment in future   -LFT wnl   -check HCV RNA        normal...
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
## 2024-01-06 NOTE — TRANSFER OF CARE
"Anesthesia Transfer of Care Note    Patient: Barrington Amin    Procedure(s) Performed: Procedure(s) (LRB):  IRRIGATION AND DRAINAGE ABSCESS RIGHT THIGH WITH ARTHROTOMY OF RIGHT HIP, TRIOS, ANTIBOTIC BEADS (Right)    Patient location: PACU    Anesthesia Type: general    Transport from OR: Transported from OR on 6-10 L/min O2 by face mask with adequate spontaneous ventilation    Post pain: adequate analgesia    Post assessment: no apparent anesthetic complications and tolerated procedure well    Post vital signs: stable    Level of consciousness: sedated    Nausea/Vomiting: no nausea/vomiting    Complications: none    Transfer of care protocol was followed      Last vitals: Visit Vitals  /75   Pulse 69   Temp 36.7 °C (98 °F) (Oral)   Resp 18   Ht 5' 5" (1.651 m)   Wt 70.3 kg (154 lb 15.7 oz)   SpO2 100%   BMI 25.79 kg/m²     "

## 2024-01-06 NOTE — PROGRESS NOTES
Pharmacokinetic Initial Assessment: IV Vancomycin    Assessment/Plan:    Patient received empiric dose of Intravenous Vancomycin in the setting of ED. Random resulted 13.3 after one dose of 1500 mg post dose.  Restarted Vancomycin therapy with maintenance dose of vancomycin 1000 mg IV every 12 hours.  Desired empiric serum trough concentration is 10 to 20 mcg/mL  Draw vancomycin trough level 60 min prior to fourth dose on 1/8 at approximately 0130  Pharmacy will continue to follow and monitor vancomycin.      Please contact pharmacy at extension [phone] with any questions regarding this assessment.     Thank you for the consult,   Basia Chan       Patient brief summary:  Barrington Amin is a 48 y.o. male initiated on antimicrobial therapy with IV Vancomycin for treatment of suspected bone/joint infection    Drug Allergies:   Review of patient's allergies indicates:  No Known Allergies    Actual Body Weight:   71.2 kg    Renal Function:   Estimated Creatinine Clearance: 99.3 mL/min (based on SCr of 0.8 mg/dL).,     Dialysis Method (if applicable):  N/A    CBC (last 72 hours):  Recent Labs   Lab Result Units 01/04/24 0821 01/05/24 2017 01/06/24  0351   WBC K/uL 6.61 7.65 6.03   Hemoglobin g/dL 9.2* 9.5* 8.1*   Hematocrit % 31.8* 32.6* 28.2*   Platelets K/uL 539* 592* 502*   Gran % % 66.4 71.9 62.2   Lymph % % 21.3 20.7 23.1   Mono % % 10.1 5.8 12.1   Eosinophil % % 1.7 0.9 1.8   Basophil % % 0.3 0.3 0.3   Differential Method  Automated Automated Automated       Metabolic Panel (last 72 hours):  Recent Labs   Lab Result Units 01/04/24 0821 01/05/24 2017 01/06/24  0351   Sodium mmol/L 136 133* 133*   Potassium mmol/L 4.1 4.8 3.7   Chloride mmol/L 99 98 100   CO2 mmol/L 28 23 23   Glucose mg/dL 95 85 101   BUN mg/dL 11 13 14   Creatinine mg/dL 0.8 0.8 0.8   Albumin g/dL 2.5* 2.7* 2.3*   Total Bilirubin mg/dL 0.2 0.3 0.4   Alkaline Phosphatase U/L 113 109 98   AST U/L 21 26 20   ALT U/L 18 19 15   Magnesium mg/dL  --   2.0 1.9   Phosphorus mg/dL  --   --  3.5       Drug levels (last 3 results):  Recent Labs   Lab Result Units 01/06/24  1001   Vancomycin, Random ug/mL 13.3       Microbiologic Results:  Microbiology Results (last 7 days)       Procedure Component Value Units Date/Time    Tissue culture [[id number]] Collected: 01/06/24 0851    Order Status: Completed Specimen: Tissue from Hip, Right Updated: 01/06/24 1315     Gram Stain Result No WBC's      No organisms seen    Tissue culture [[id number]] Collected: 01/06/24 0851    Order Status: Completed Specimen: Tissue from Hip, Right Updated: 01/06/24 1149     Gram Stain Result Many WBC's      Rare Gram positive cocci    Tissue culture [[id number]] Collected: 01/06/24 0851    Order Status: Completed Specimen: Tissue from Hip, Right Updated: 01/06/24 1146     Gram Stain Result Moderate WBC's      No organisms seen    Culture, Anaerobe [[id number]] Collected: 01/06/24 0851    Order Status: Sent Specimen: Incision site from Hip, Right Updated: 01/06/24 0912    Culture, Anaerobe [[id number]] Collected: 01/06/24 0851    Order Status: Sent Specimen: Incision site from Hip, Right Updated: 01/06/24 0912    Culture, Anaerobe [[id number]] Collected: 01/06/24 0851    Order Status: Sent Specimen: Incision site from Hip, Right Updated: 01/06/24 0912    Fungus culture [[id number]] Collected: 01/06/24 0851    Order Status: Sent Specimen: Incision site from Hip, Right Updated: 01/06/24 0909    Fungus culture [[id number]] Collected: 01/06/24 0851    Order Status: Sent Specimen: Incision site from Hip, Right Updated: 01/06/24 0909    Fungus culture [[id number]] Collected: 01/06/24 0851    Order Status: Sent Specimen: Incision site from Hip, Right Updated: 01/06/24 0908    Aerobic culture [[id number]] Collected: 01/06/24 0851    Order Status: Canceled Specimen: Incision site from Hip, Right     Aerobic culture [[id number]] Collected: 01/06/24 0851    Order Status: Canceled Specimen: Incision site  from Hip, Right     Aerobic culture [[id number]] Collected: 01/06/24 0851    Order Status: Canceled Specimen: Incision site from Hip, Right     Blood culture #1 **CANNOT BE ORDERED STAT** [[id number]] Collected: 01/05/24 2031    Order Status: Completed Specimen: Blood from Peripheral, Antecubital, Left Updated: 01/06/24 0315     Blood Culture, Routine No Growth to date    Blood culture #2 **CANNOT BE ORDERED STAT** [[id number]] Collected: 01/05/24 2017    Order Status: Completed Specimen: Blood from Peripheral, Forearm, Right Updated: 01/06/24 0315     Blood Culture, Routine No Growth to date    Blood culture [[id number]] Collected: 01/05/24 2017    Order Status: Completed Specimen: Blood from Peripheral, Forearm, Right Updated: 01/06/24 0315     Blood Culture, Routine No Growth to date

## 2024-01-06 NOTE — ANESTHESIA PROCEDURE NOTES
Intubation    Date/Time: 1/6/2024 8:00 AM    Performed by: Jose Rafael Mejía CRNA  Authorized by: Arnaldo Cohen MD    Intubation:     Induction:  Intravenous    Intubated:  Postinduction    Mask Ventilation:  Not attempted    Attempts:  1    Attempted By:  CRNA    Method of Intubation:  Video laryngoscopy    Laryngeal View Grade: Grade I - full view of cords      Difficult Airway Encountered?: No      Complications:  None    Airway Device:  Oral endotracheal tube    Airway Device Size:  7.5    Style/Cuff Inflation:  Cuffed    Inflation Amount (mL):  8    Tube secured:  22    Secured at:  The lips    Placement Verified By:  Capnometry    Complicating Factors:  None    Findings Post-Intubation:  BS equal bilateral and atraumatic/condition of teeth unchanged

## 2024-01-06 NOTE — ASSESSMENT & PLAN NOTE
Advance Care Planning     Date: 01/06/2024    [facility]  I engaged the patient in a voluntary conversation about advance care planning and we specifically addressed what the goals of care would be moving forward, in light of the patient's change in clinical status, specifically if his heart stops or unable to breath on his own.  We did specifically address the patient's likely prognosis, which is fair .  We explored the patient's values and preferences for future care.  The patient endorses that what is most important right now is to focus on remaining as independent as possible, symptom/pain control, and extending life as long as possible, even it it means sacrificing quality    Accordingly, we have decided that the best plan to meet the patient's goals includes continuing with treatment and wishes to remain full code.     I spent a total of 10 minutes engaging the patient in this advance care planning discussion.

## 2024-01-06 NOTE — ED TRIAGE NOTES
Barrington Amin, a 48 y.o. male presents to the ED w/ complaint of admission for left hip infection    Adult Physical Assessment  LOC: Barrington Amin, 48 y.o. male verified via two identifiers.  The patient is awake, alert, oriented and speaking appropriately at this time.  APPEARANCE: Patient resting comfortably and appears to be in no acute distress at this time. Patient is clean and well groomed, patient's clothing is properly fastened.  SKIN:The skin is warm and dry, color consistent with ethnicity, patient has normal skin turgor. Wounds noted to patients arms. See media  MUSCULOSKELETAL: Patient reports 10/10 pain to left hip. Patient limping with assistance  RESPIRATORY: Airway is open and patent, respirations are spontaneous, patient has a normal effort and rate, no accessory muscle use noted.  CARDIAC: Patient has a normal rate and rhythm, no periphreal edema noted in any extremity, capillary refill < 3 seconds in all extremities  ABDOMEN: Soft and non tender to palpation, no abdominal distention noted. Bowel sounds present in all four quadrants.  NEUROLOGIC: Eyes open spontaneously, behavior appropriate to situation, follows commands, facial expression symmetrical, bilateral hand grasp equal and even, purposeful motor response noted, normal sensation in all extremities when touched with a finger.      Triage note:  Chief Complaint   Patient presents with    Admission     States here for admission/left hip infection     Review of patient's allergies indicates:  No Known Allergies  Past Medical History:   Diagnosis Date    Hypertension
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
## 2024-01-06 NOTE — ED TRIAGE NOTES
Barrington Amin, an 48 y.o. male presents to the ED as a referral from [facility] about a possible infection in his R hip. Hx of fall 3 months ago. +R knee pain.       Chief Complaint   Patient presents with    Admission     States here for admission/left hip infection     Review of patient's allergies indicates:  No Known Allergies  Past Medical History:   Diagnosis Date    Hypertension

## 2024-01-06 NOTE — ASSESSMENT & PLAN NOTE
-septic arthritis of right hip secondary to osteomyelitis in the right acetabulum as seen on MRI (01/04/24) in a patient with IVDU   -elevated ESR and CRP   -afebrile, no leukocytosis and does not appear toxic   -neurovascular intact   -seen by orthopedic in ED and scheduled for surgical washout later today. Patient is deemed not a candidate for total hip replacement at current time due to active IVDU   -received empiric dose of vancomycin and zosyn in ED   -will hold off antibiotics per now per ortho recommendation as patient w/o sepsis and to increase yield of surgical cultures   -start on empiric IV antibiotics post op and tailor based on cultures   -no active cardiac issue and denies chest pain or SOB   -check EKG and no further cardiac testing warranted prior to surgery     Perioperative Risk Assessment:  Patient is at low risk for perioperative cardiopulmonary complications.  Recommend proceed to surgery as planned. We will follow with you during the perioperative period  to manage any active medical issues and prevent postoperative complications.    Perioperative Management Recommendations:    -npo, mIVF, multimodal pain control   -he is not on blood thinner or beta blocker at home   -monitor for alcohol and opioid withdrawal during perioperative period using CIWA and COWS  -will order prn meds for withdrawals   -SCDs for preop DVT PPx

## 2024-01-06 NOTE — NURSING TRANSFER
Nursing Transfer Note      1/6/2024   11:37 AM    Nurse giving handoff:sakina pacheco  Nurse receiving handoff:sakina Champagne    Reason patient is being transferred: admit    Transfer To: 529    Transfer via bed    Transported by heath and transport    Order for Tele Monitor? No    4eyes on Skin: yes    Medicines sent: abx going    Any special needs or follow-up needed: na    Patient belongings transferred with patient: Yes    Chart send with patient: Yes    Notified: spouse    Spouse updated and sent to room with belongings.

## 2024-01-06 NOTE — ASSESSMENT & PLAN NOTE
-chronic in nature w/o signs of active infection   -likely induced by Xylazine (Horse tranquilizer) mixed with heroin by suppliers   -will consult wound care

## 2024-01-06 NOTE — ASSESSMENT & PLAN NOTE
-low albumin and prealbumin likely due to LUDWIN   -will consult nutrition   -boosts supplement with meals
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
## 2024-01-06 NOTE — SUBJECTIVE & OBJECTIVE
"Past Medical History:   Diagnosis Date    Hypertension        No past surgical history on file.    Review of patient's allergies indicates:  No Known Allergies    Current Facility-Administered Medications   Medication    vancomycin 1,500 mg in dextrose 5 % (D5W) 250 mL IVPB (Vial-Mate)     Current Outpatient Medications   Medication Sig    mirtazapine (REMERON) 30 MG tablet Take 30 mg by mouth.    phenytoin (DILANTIN) 100 MG ER capsule Take 100 mg by mouth.    traZODone (DESYREL) 100 MG tablet Take 100 mg by mouth every evening.     Family History    None       Tobacco Use    Smoking status: Never    Smokeless tobacco: Never   Substance and Sexual Activity    Alcohol use: Never    Drug use: Never    Sexual activity: Not on file     ROS  Constitutional: negative for fevers  Eyes: negative visual changes  ENT: negative for hearing loss  Respiratory: negative for dyspnea  Cardiovascular: negative for chest pain  Gastrointestinal: negative for abdominal pain  Genitourinary: negative for dysuria  Neurological: negative for headaches  Behavioral/Psych: negative for hallucinations  Endocrine: negative for temperature intolerance     Objective:     Vital Signs (Most Recent):  Temp: 99.2 °F (37.3 °C) (01/05/24 1805)  Pulse: 77 (01/05/24 1805)  Resp: 15 (01/05/24 1805)  BP: 113/63 (01/05/24 1805)  SpO2: 99 % (01/05/24 1805) Vital Signs (24h Range):  Temp:  [99.2 °F (37.3 °C)] 99.2 °F (37.3 °C)  Pulse:  [77] 77  Resp:  [15] 15  SpO2:  [99 %] 99 %  BP: (113)/(63) 113/63     Weight: 70.3 kg (155 lb)  Height: 5' 5" (165.1 cm)  Body mass index is 25.79 kg/m².    No intake or output data in the 24 hours ending 01/05/24 2237     Ortho/SPM Exam  General:  no acute distress, appears stated age   Neuro: alert and oriented x3  Psych: normal mood  Head: normocephalic, atraumatic.  Eyes: no scleral icterus  Mouth: moist mucous membranes  Cardiovascular: extremities warm and well perfused  Lungs: breathing comfortably, equal chest rise " bilat  Skin: clean, dry, intact (any exceptions noted in below musculoskeletal exam)    MSK:  RUE:  - Skin intact throughout, various areas of wounds with granulation tissue noted, patient reports it's from allergic reaction to soap powder  - No swelling  - No ecchymosis, erythema, or signs of cellulitis  - NonTTP throughout  - AROM and PROM of the shoulder, elbow, wrist, and hand intact without pain  - Axillary/AIN/PIN/Radial/Median/Ulnar Nerves assessed in isolation without deficit  - SILT throughout  - Compartments soft  - Radial artery palpated   - Capillary Refill <3s    LUE:  - Skin intact throughout, various areas of granulation tissue noted  - No swelling  - No ecchymosis, erythema, or signs of cellulitis  - NonTTP throughout  - AROM and PROM of the shoulder, elbow, wrist, and hand intact without pain  - Axillary/AIN/PIN/Radial/Median/Ulnar Nerves assessed in isolation without deficit  - SILT throughout  - Compartments soft  - Radial artery palpated   - Capillary Refill <3s    RLE:  - Skin intact throughout  - No swelling  - No ecchymosis, erythema, or signs of cellulitis  - Tenderness to palpation noted in the anterior/proximal portion of the right thigh.  - AROM is not possible due to patient refusal from pain, and PROM is limited due to the pain.   - SILT throughout  - Anterior compartment of thigh noted to be hard to palpation     LLE:  - Skin intact throughout, no open wounds  - No swelling  - No ecchymosis, erythema, or signs of cellulitis  - NonTTP throughout  - AROM and PROM of the hip, knee, ankle, and foot intact without pain  - TA/EHL/Gastroc/FHL assessed in isolation without deficit  - SILT throughout  - Compartments soft     Significant Labs: CBC:   Recent Labs   Lab 01/04/24 0821 01/05/24 2017   WBC 6.61 7.65   HGB 9.2* 9.5*   HCT 31.8* 32.6*   * 592*     CMP:   Recent Labs   Lab 01/04/24 0821 01/05/24 2017    133*   K 4.1 4.8   CL 99 98   CO2 28 23   GLU 95 85   BUN 11 13    CREATININE 0.8 0.8   CALCIUM 9.6 9.7   PROT 9.2* 10.4*   ALBUMIN 2.5* 2.7*   BILITOT 0.2 0.3   ALKPHOS 113 109   AST 21 26   ALT 18 19   ANIONGAP 9 12     CRP:   Recent Labs   Lab 01/04/24 0821 01/05/24 2017   .4* 112.9*     All pertinent labs within the past 24 hours have been reviewed.    Significant Imaging: X-Ray: I have reviewed all pertinent results/findings and my personal findings are:  A/P pelvic x-ray significant for diffuse osteoarthritis with multiple osteophytes and possible fusions between the femoral head and the acetabulum.  MRI: I have reviewed all pertinent results/findings and my personal findings are:  Significant soft tissue swelling around the right hip joint concerning for osteomyelitis and abscess of anterior thigh

## 2024-01-06 NOTE — PLAN OF CARE
Patient with 1/6 blood Cx with yong, patient with multiple lab techs who have come to try to get Cx who have been unable to get  blood draw per nursing. Given this, and just received IV antibiotics post op, if true Cx + likely will still be + so will let him have a break from beind stuck and change Cx to AM draw to recollect.

## 2024-01-06 NOTE — OP NOTE
OPERATIVE NOTE    DATE OF PROCEDURE: 01/06/2024     PREOPERATIVE DIAGNOSIS: Pyogenic arthritis of right hip, due to unspecified organism [M00.9]    POSTOPERATIVE DIAGNOSIS: Pyogenic arthritis of right hip, due to unspecified organism [M00.9]    PROCEDURE PERFORMED:   Procedure(s) (LRB):  IRRIGATION AND DRAINAGE ABSCESS RIGHT THIGH WITH ARTHROTOMY OF RIGHT HIP, SIMIN, (Right)  Surgeon(s) and Role:     * Khai Boone MD - Primary     * SINDI Barry MD - Resident - Assisting    Assistants: Johnnie Alcantara MD    ANESTHESIA: Choice    ESTIMATED BLOOD LOSS: 50 cc.     Complications: None    Specimens: Culture stick x 3. Tissue for culture.    IMPLANTS:  Implant Name Type Inv. Item Serial No.  Lot No. LRB No. Used Action   KIT GRAFT BONE/SUB STIM 10ML - [id number]  KIT GRAFT BONE/SUB STIM 10ML  BIOCOMPOSITE  Right 1 Implanted        INDICATIONS FOR PROCEDURE: The patient is an 48 y.o. male who presented to ED with 4 months of right hip pain.MRI revealed abscess within the rectus femoris and large hip effusion.  It was decided that the best plan of action was to take the patient back to the operative theatre for arthrotomy and irrigation with debridement.  This procedure, as well as, alternatives to this procedure was discussed at length with the patient. Risks and benefits were also discussed. Risks include but are not limited to bleeding, infection, numbness, scarring, damage to major neurovascular structures, need for further surgery, loss of function, myocardial infarction, deep venous thrombosis, pulmonary embolism, and death. Patient understood these well and consented for the procedure as described.    OPERATIVE PROCEDURE:  Patient met in the preoperative hold area and the correct site and side of surgery being the right lower extremity were marked and verified.  Patient brought back to the operative suite.  General anesthesia smoothly induced.  Patient transferred over to operative table.  Placed  in supine position. All bony prominences were appropriately padded.  Patient received ancef for preoperative antibiotics.  The operative extremity was then prepped and draped in normal sterile fashion.    Time-out was performed verifying the correct patient, site/side of surgery, surgical consent, radiographs as applicable, preop antibiotics, necessary equipment, anticipated blood loss, length of procedure, postoperative disposition.      A standard anterior approach to the hip was marked out and carried down to the level of the fascia.  This was incised longitudinally noting thickening of the fascia layers and tense woodiness of the TFL and sartorius at this level.  Blunt dissection down the level of the glute just medius and a Hohmann retractor was introduced deep to the medius and superficial to the joint capsule.  At this point murky fluid was encountered.  Culture sticks x3 were sent at this time.  A Mcdonald was used to elevate the rectus off the anterior aspect of the hip capsule.  Another blunt Hohmann was placed the anterior inferior aspect of the hip capsule.  At this point the skin was re-prepped with Betadine in the soft tissues were irrigated with dilute Betadine and 1 L of normal saline.  An arthrotomy was created along the superior 1/3 of the femoral neck.  Retractors were replaced within the hip capsule.  A leaflet of the hip capsule was removed and sent for culture.  The hip joint was then copiously irrigated with normal saline taking care to range the hip in order to provide adequate washout.  The operative team changed gloves and new down sheet was placed.  On the back table stimulant beads were made with vancomycin and tobramycin.  These were then placed deep within the hip capsule and deep to the fascia.  A 10 Guinean channel drain was placed and exited superolateral.  Drain was sewn into place with 3-0 nylon.  All retractors were removed and bleeding controlled with electrocautery.  The fascia was  closed with 0 Vicryl.  Subcutaneous tissue closed with 2-0 Vicryl.  Skin closed with 3-0 nylon and Dermabond.  Soft dressing applied.        At the conclusion of procedure the patient had soft and compressible compartments, brisk cap refill, palpable posterior tibial pulse in the operative extremity.    Prior to final closure all counts were confirmed to be correct.  Patient tolerated the procedure well without any complications, was awoken from anesthesia, transferred PACU for further recovery.    POSTOPERATIVE PLAN:  PT/OT, right lower extremity weight bear as tolerated   IV antibiotics per primary team   Appreciate ID assistance for antibiotic selection   Follow up cultures   Nutrition support    I discussed with the patient at length the seriousness of his right hip joint osteo myelitis and septic arthritis.  We discussed that there was already irreparable damage to the hip joint itself and that this procedure we will only help with the infection the unlikely too much for his hip pain.  Long-term he would not show that he is able to be compliant and off of IV drugs prior to any discussion about hip arthroplasty.    I was present for all key aspects of this surgery, and either in the room or immediately available for the entire operation.    Khai Boone MD  Orthopaedic Surgeon  Department of Orthopaedic Surgery  [phone]
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
## 2024-01-06 NOTE — ANESTHESIA PREPROCEDURE EVALUATION
[facility]  Anesthesia Pre-Operative Evaluation         Paper consent at bedside. Patient requested spouse at bedside sign for him.     Patient Name: Barrintgon Amin  YOB: 1975  MRN: [id number]    SUBJECTIVE:     Pre-operative evaluation for Procedure(s) (LRB):  IRRIGATION AND DRAINAGE ABSCESS RIGHT THIGH WITH ARTHROTOMY OF RIGHT HIP, TRIOS, C-ARM DOOR SIDE (Right)     01/06/2024    Barrington Amin is a 48 y.o. male w/ a significant PMHx of IVDU (heroin and cocaine), tobacco and alcohol use, chronic hepatitis C, HTN presenting with right hip pain and concern for septic arthritis secondary to osteomyelitis in the right acetabulum.     Patient now presents for the above procedure(s).      LDA:        Peripheral IV - Single Lumen 01/05/24 2052 20 G Anterior;Distal;Right Upper Arm (Active)   Site Assessment Clean;Dry;Intact 01/05/24 2052   Line Status Blood return noted 01/05/24 2052   Dressing Status Clean;Dry;Intact 01/05/24 2052   Number of days: 0       Prev airway: None documented.    Drips: None documented.      Patient Active Problem List   Diagnosis    Anemia    Hydrocephalus    History of heroin abuse    Presence of intracranial shunt    Small bowel obstruction due to adhesions    Chronic hepatitis C without hepatic coma    Pyogenic arthritis of right hip       Review of patient's allergies indicates:  No Known Allergies    Current Inpatient Medications:      No current facility-administered medications on file prior to encounter.     Current Outpatient Medications on File Prior to Encounter   Medication Sig Dispense Refill    mirtazapine (REMERON) 30 MG tablet Take 30 mg by mouth.      phenytoin (DILANTIN) 100 MG ER capsule Take 100 mg by mouth.      traZODone (DESYREL) 100 MG tablet Take 100 mg by mouth every evening.         No past surgical history on file.    Social History     Socioeconomic History    Marital status:    Tobacco Use    Smoking status: Never    Smokeless  tobacco: Never   Substance and Sexual Activity    Alcohol use: Never    Drug use: Never       OBJECTIVE:     Vital Signs Range (Last 24H):  Temp:  [37.3 °C (99.2 °F)]   Pulse:  [77]   Resp:  [15-16]   BP: (113)/(63)   SpO2:  [99 %]       Significant Labs:  Lab Results   Component Value Date    WBC 7.65 01/05/2024    HGB 9.5 (L) 01/05/2024    HCT 32.6 (L) 01/05/2024     (H) 01/05/2024    ALT 19 01/05/2024    AST 26 01/05/2024     (L) 01/05/2024    K 4.8 01/05/2024    CL 98 01/05/2024    CREATININE 0.8 01/05/2024    BUN 13 01/05/2024    CO2 23 01/05/2024       Diagnostic Studies: No relevant studies.    EKG:   No results found for this or any previous visit.    2D ECHO:  TTE:  No results found for this or any previous visit.    SAMY:  No results found for this or any previous visit.    ASSESSMENT/PLAN:         Pre-op Assessment    I have reviewed the Patient Summary Reports.     I have reviewed the Nursing Notes. I have reviewed the NPO Status.   I have reviewed the Medications.     Review of Systems  Anesthesia Hx:   Neg history of prior surgery.          Denies Family Hx of Anesthesia complications.    Denies Personal Hx of Anesthesia complications.                    Social:  Recreational Drugs, Smoker, Alcohol Use       Hematology/Oncology:    Oncology Normal    -- Anemia:                                  EENT/Dental:  EENT/Dental Normal           Cardiovascular:     Hypertension                                  Hypertension         Pulmonary:  Pulmonary Normal                       Renal/:  Renal/ Normal                 Hepatic/GI:       Hepatitis, C        Liver Disease, Hepatitis        Neurological:  Neurology Normal                                      Endocrine:  Endocrine Normal            Psych:  Psychiatric Normal                    Physical Exam  General: Cooperative, Alert, Oriented and Somnolent  Drowsy due to pain medication but awakens to voice and voiced understanding. Responded to  questions appropriately.  Airway:  Mouth Opening: Normal  TM Distance: Normal  Tongue: Normal    Dental:  Periodontal disease        Anesthesia Plan  Type of Anesthesia, risks & benefits discussed:    Anesthesia Type: Gen ETT, Regional  Intra-op Monitoring Plan: Standard ASA Monitors  Post Op Pain Control Plan: multimodal analgesia, IV/PO Opioids PRN and peripheral nerve block  Induction:  IV  Airway Plan: Video, Post-Induction  Informed Consent: Informed consent signed with the Patient and all parties understand the risks and agree with anesthesia plan.  All questions answered.   ASA Score: 3  Day of Surgery Review of History & Physical: H&P Update referred to the surgeon/provider.    Ready For Surgery From Anesthesia Perspective.     .
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
## 2024-01-06 NOTE — ASSESSMENT & PLAN NOTE
Patient's anemia is currently controlled. Has not received any PRBCs to date. Etiology likely d/t Iron deficiency and chronic disease due to LUDWIN and malnutrition   Current CBC reviewed-   Lab Results   Component Value Date    HGB 9.5 (L) 01/05/2024    HCT 32.6 (L) 01/05/2024     Monitor serial CBC and transfuse if patient becomes hemodynamically unstable, symptomatic or H/H drops below 7/21.  -check FOBT, iron panel, vitamin B12, folate level

## 2024-01-06 NOTE — ED NOTES
Nurses Note -- 4 Eyes      1/6/2024   1:31 AM      Skin assessed during: Admit      [] No Altered Skin Integrity Present    []Prevention Measures Documented      [] Yes- Altered Skin Integrity Present or Discovered   [x] LDA Added if Not in Epic (Describe Wound)   [x] New Altered Skin Integrity was Present on Admit and Documented in LDA   [x] Wound Image Taken    Wound Care Consulted? No    Attending Nurse:  Randi Alexander RN     Second RN/Staff Member:  MARIA ISABEL Gibson

## 2024-01-06 NOTE — ASSESSMENT & PLAN NOTE
-long term use of cocaine, IVDU (heroin, fentanyl), alcohol and tobacco   -last used heroin 4 days ago and cocaine last week   -no signs of withdrawal at current time   -monitor for withdrawal using CIWA and COWS   -prn meds ordered for withdrawal   -counseled cessation and will order nicotine patch   -consider addiction psych consult

## 2024-01-07 PROBLEM — Z01.818 PREOPERATIVE CLEARANCE: Status: RESOLVED | Noted: 2024-01-06 | Resolved: 2024-01-07

## 2024-01-07 PROBLEM — R78.81 STAPHYLOCOCCUS AUREUS BACTEREMIA: Status: ACTIVE | Noted: 2024-01-07

## 2024-01-07 PROBLEM — B95.61 STAPHYLOCOCCUS AUREUS BACTEREMIA: Status: ACTIVE | Noted: 2024-01-07

## 2024-01-07 LAB
ALBUMIN SERPL BCP-MCNC: 2.4 G/DL (ref 3.5–5.2)
ALP SERPL-CCNC: 97 U/L (ref 55–135)
ALT SERPL W/O P-5'-P-CCNC: 15 U/L (ref 10–44)
ANION GAP SERPL CALC-SCNC: 9 MMOL/L (ref 8–16)
AST SERPL-CCNC: 22 U/L (ref 10–40)
BASOPHILS # BLD AUTO: 0.01 K/UL (ref 0–0.2)
BASOPHILS NFR BLD: 0.1 % (ref 0–1.9)
BILIRUB SERPL-MCNC: 0.2 MG/DL (ref 0.1–1)
BUN SERPL-MCNC: 16 MG/DL (ref 6–20)
CALCIUM SERPL-MCNC: 9.7 MG/DL (ref 8.7–10.5)
CHLORIDE SERPL-SCNC: 101 MMOL/L (ref 95–110)
CO2 SERPL-SCNC: 25 MMOL/L (ref 23–29)
CREAT SERPL-MCNC: 1.1 MG/DL (ref 0.5–1.4)
DIFFERENTIAL METHOD BLD: ABNORMAL
EOSINOPHIL # BLD AUTO: 0 K/UL (ref 0–0.5)
EOSINOPHIL NFR BLD: 0 % (ref 0–8)
ERYTHROCYTE [DISTWIDTH] IN BLOOD BY AUTOMATED COUNT: 17.7 % (ref 11.5–14.5)
EST. GFR  (NO RACE VARIABLE): >60 ML/MIN/1.73 M^2
GLUCOSE SERPL-MCNC: 111 MG/DL (ref 70–110)
HBV CORE AB SERPL QL IA: REACTIVE
HCT VFR BLD AUTO: 28.5 % (ref 40–54)
HGB BLD-MCNC: 8.8 G/DL (ref 14–18)
IMM GRANULOCYTES # BLD AUTO: 0.02 K/UL (ref 0–0.04)
IMM GRANULOCYTES NFR BLD AUTO: 0.3 % (ref 0–0.5)
LYMPHOCYTES # BLD AUTO: 1.5 K/UL (ref 1–4.8)
LYMPHOCYTES NFR BLD: 18.4 % (ref 18–48)
MAGNESIUM SERPL-MCNC: 1.9 MG/DL (ref 1.6–2.6)
MCH RBC QN AUTO: 20 PG (ref 27–31)
MCHC RBC AUTO-ENTMCNC: 30.9 G/DL (ref 32–36)
MCV RBC AUTO: 65 FL (ref 82–98)
MONOCYTES # BLD AUTO: 0.5 K/UL (ref 0.3–1)
MONOCYTES NFR BLD: 6 % (ref 4–15)
NEUTROPHILS # BLD AUTO: 6 K/UL (ref 1.8–7.7)
NEUTROPHILS NFR BLD: 75.2 % (ref 38–73)
NRBC BLD-RTO: 0 /100 WBC
PHOSPHATE SERPL-MCNC: 3.5 MG/DL (ref 2.7–4.5)
PLATELET # BLD AUTO: 553 K/UL (ref 150–450)
PMV BLD AUTO: 7.6 FL (ref 9.2–12.9)
POTASSIUM SERPL-SCNC: 3.6 MMOL/L (ref 3.5–5.1)
PROT SERPL-MCNC: 9.1 G/DL (ref 6–8.4)
RBC # BLD AUTO: 4.39 M/UL (ref 4.6–6.2)
SODIUM SERPL-SCNC: 135 MMOL/L (ref 136–145)
WBC # BLD AUTO: 7.94 K/UL (ref 3.9–12.7)

## 2024-01-07 PROCEDURE — 63600175 PHARM REV CODE 636 W HCPCS: Performed by: HOSPITALIST

## 2024-01-07 PROCEDURE — 87077 CULTURE AEROBIC IDENTIFY: CPT | Performed by: HOSPITALIST

## 2024-01-07 PROCEDURE — 85025 COMPLETE CBC W/AUTO DIFF WBC: CPT | Performed by: HOSPITALIST

## 2024-01-07 PROCEDURE — 11000001 HC ACUTE MED/SURG PRIVATE ROOM

## 2024-01-07 PROCEDURE — 25000003 PHARM REV CODE 250

## 2024-01-07 PROCEDURE — 83735 ASSAY OF MAGNESIUM: CPT | Performed by: HOSPITALIST

## 2024-01-07 PROCEDURE — 87040 BLOOD CULTURE FOR BACTERIA: CPT | Mod: 59 | Performed by: HOSPITALIST

## 2024-01-07 PROCEDURE — 97530 THERAPEUTIC ACTIVITIES: CPT

## 2024-01-07 PROCEDURE — 63600175 PHARM REV CODE 636 W HCPCS: Performed by: STUDENT IN AN ORGANIZED HEALTH CARE EDUCATION/TRAINING PROGRAM

## 2024-01-07 PROCEDURE — 87150 DNA/RNA AMPLIFIED PROBE: CPT | Performed by: HOSPITALIST

## 2024-01-07 PROCEDURE — 84100 ASSAY OF PHOSPHORUS: CPT | Performed by: HOSPITALIST

## 2024-01-07 PROCEDURE — 80053 COMPREHEN METABOLIC PANEL: CPT | Performed by: HOSPITALIST

## 2024-01-07 PROCEDURE — 97165 OT EVAL LOW COMPLEX 30 MIN: CPT

## 2024-01-07 PROCEDURE — 36415 COLL VENOUS BLD VENIPUNCTURE: CPT | Performed by: HOSPITALIST

## 2024-01-07 PROCEDURE — 25000003 PHARM REV CODE 250: Performed by: HOSPITALIST

## 2024-01-07 PROCEDURE — 97161 PT EVAL LOW COMPLEX 20 MIN: CPT

## 2024-01-07 PROCEDURE — 97116 GAIT TRAINING THERAPY: CPT

## 2024-01-07 PROCEDURE — 87186 SC STD MICRODIL/AGAR DIL: CPT | Performed by: HOSPITALIST

## 2024-01-07 PROCEDURE — 25000003 PHARM REV CODE 250: Performed by: STUDENT IN AN ORGANIZED HEALTH CARE EDUCATION/TRAINING PROGRAM

## 2024-01-07 PROCEDURE — 63600175 PHARM REV CODE 636 W HCPCS

## 2024-01-07 PROCEDURE — 86704 HEP B CORE ANTIBODY TOTAL: CPT | Performed by: HOSPITALIST

## 2024-01-07 RX ORDER — METRONIDAZOLE 500 MG/1
500 TABLET ORAL EVERY 12 HOURS
Status: DISCONTINUED | OUTPATIENT
Start: 2024-01-07 | End: 2024-01-09

## 2024-01-07 RX ORDER — PROCHLORPERAZINE EDISYLATE 5 MG/ML
10 INJECTION INTRAMUSCULAR; INTRAVENOUS EVERY 6 HOURS PRN
Status: DISCONTINUED | OUTPATIENT
Start: 2024-01-07 | End: 2024-01-12 | Stop reason: HOSPADM

## 2024-01-07 RX ADMIN — PREGABALIN 75 MG: 50 CAPSULE ORAL at 09:01

## 2024-01-07 RX ADMIN — OXYCODONE HYDROCHLORIDE 10 MG: 10 TABLET ORAL at 02:01

## 2024-01-07 RX ADMIN — PIPERACILLIN SODIUM AND TAZOBACTAM SODIUM 4.5 G: 4; .5 INJECTION, POWDER, FOR SOLUTION INTRAVENOUS at 03:01

## 2024-01-07 RX ADMIN — VANCOMYCIN HYDROCHLORIDE 1000 MG: 1 INJECTION, POWDER, LYOPHILIZED, FOR SOLUTION INTRAVENOUS at 01:01

## 2024-01-07 RX ADMIN — HYDROXYZINE HYDROCHLORIDE 50 MG: 25 TABLET ORAL at 05:01

## 2024-01-07 RX ADMIN — PROCHLORPERAZINE EDISYLATE 10 MG: 5 INJECTION INTRAMUSCULAR; INTRAVENOUS at 03:01

## 2024-01-07 RX ADMIN — ONDANSETRON 4 MG: 2 INJECTION INTRAMUSCULAR; INTRAVENOUS at 01:01

## 2024-01-07 RX ADMIN — VANCOMYCIN HYDROCHLORIDE 1000 MG: 1 INJECTION, POWDER, LYOPHILIZED, FOR SOLUTION INTRAVENOUS at 02:01

## 2024-01-07 RX ADMIN — METRONIDAZOLE 500 MG: 500 TABLET ORAL at 09:01

## 2024-01-07 RX ADMIN — PIPERACILLIN SODIUM AND TAZOBACTAM SODIUM 4.5 G: 4; .5 INJECTION, POWDER, FOR SOLUTION INTRAVENOUS at 04:01

## 2024-01-07 RX ADMIN — Medication 100 MG: at 08:01

## 2024-01-07 RX ADMIN — CEFEPIME 2 G: 2 INJECTION, POWDER, FOR SOLUTION INTRAVENOUS at 09:01

## 2024-01-07 RX ADMIN — METHOCARBAMOL 750 MG: 750 TABLET ORAL at 08:01

## 2024-01-07 RX ADMIN — ACETAMINOPHEN 1000 MG: 325 TABLET ORAL at 08:01

## 2024-01-07 RX ADMIN — OXYCODONE HYDROCHLORIDE 10 MG: 10 TABLET ORAL at 11:01

## 2024-01-07 RX ADMIN — METHOCARBAMOL 750 MG: 750 TABLET ORAL at 09:01

## 2024-01-07 RX ADMIN — ACETAMINOPHEN 1000 MG: 325 TABLET ORAL at 12:01

## 2024-01-07 RX ADMIN — ACETAMINOPHEN 1000 MG: 325 TABLET ORAL at 05:01

## 2024-01-07 RX ADMIN — PREGABALIN 75 MG: 50 CAPSULE ORAL at 08:01

## 2024-01-07 RX ADMIN — ACETAMINOPHEN 1000 MG: 325 TABLET ORAL at 11:01

## 2024-01-07 RX ADMIN — OXYCODONE HYDROCHLORIDE 5 MG: 5 TABLET ORAL at 01:01

## 2024-01-07 RX ADMIN — FOLIC ACID 1 MG: 1 TABLET ORAL at 08:01

## 2024-01-07 NOTE — SUBJECTIVE & OBJECTIVE
Interval history- sitting on side of bed with family at bedside eating fries. He reports that pain has been controlled overnight. At home they deny fever/chills but had sweating episodes at times. 2/6 bottles with staph aureus on admit, and repeat CX this morning pending. Echo ordered now. Discussed with him that will need to repeat until clear and check echo for infection and they voiced understanding. Rare gpc on GS and full op Cx pending but suspect will have similar organism. Drain in place and ortho plan to repeat CRP tomorrow. ID followin and appreciate recs. Wound care consulted for arm wounds and likely to see on weekday (Monday). Labs reviewed and hg stable as low mcv/anemia at baseline likely anemia of chronic disease given chronic inflammation from infection present on admission. He denied new issues this morning and will need LTAC once med stable for long term antibiotics. He denies any symptoms of withdrawal- no diarrhea, no tremors, no vomiting.      No current facility-administered medications on file prior to encounter.     Current Outpatient Medications on File Prior to Encounter   Medication Sig    traZODone (DESYREL) 100 MG tablet Take 100 mg by mouth every evening.    mirtazapine (REMERON) 30 MG tablet Take 30 mg by mouth.    phenytoin (DILANTIN) 100 MG ER capsule Take 100 mg by mouth.     Family History    None       Tobacco Use    Smoking status: Never    Smokeless tobacco: Never   Substance and Sexual Activity    Alcohol use: Never    Drug use: Never    Sexual activity: Not on file     Review of Systems   Constitutional:  Positive for fatigue. Negative for activity change, appetite change, chills, diaphoresis, fever and unexpected weight change.   HENT:  Negative for congestion, dental problem, drooling, ear discharge, ear pain, facial swelling, hearing loss, mouth sores, nosebleeds, postnasal drip, rhinorrhea, sinus pressure, sneezing, sore throat, tinnitus, trouble swallowing and voice  change.    Eyes:  Negative for photophobia, pain, discharge, redness, itching and visual disturbance.   Respiratory:  Negative for apnea, cough, choking, chest tightness, shortness of breath, wheezing and stridor.    Cardiovascular:  Negative for chest pain, palpitations and leg swelling.   Gastrointestinal:  Negative for abdominal distention, abdominal pain, anal bleeding, blood in stool, constipation, diarrhea, nausea, rectal pain and vomiting.   Endocrine: Negative for cold intolerance, heat intolerance, polydipsia, polyphagia and polyuria.   Genitourinary:  Negative for decreased urine volume, difficulty urinating, dysuria, enuresis, flank pain, frequency, genital sores, hematuria, penile discharge, penile pain, penile swelling, scrotal swelling, testicular pain and urgency.   Musculoskeletal:  Positive for arthralgias (right hip pain) and joint swelling (R hip swelling). Negative for back pain, gait problem, myalgias, neck pain and neck stiffness.   Skin:  Positive for wound (chronic wound over bilateral upper and lower extremities w/o drainage). Negative for color change, pallor and rash.   Allergic/Immunologic: Negative for environmental allergies, food allergies and immunocompromised state.   Neurological:  Negative for dizziness, tremors, seizures, syncope, facial asymmetry, speech difficulty, weakness, light-headedness, numbness and headaches.   Hematological:  Negative for adenopathy. Does not bruise/bleed easily.   Psychiatric/Behavioral:  Negative for agitation, behavioral problems, confusion, decreased concentration, dysphoric mood, hallucinations, self-injury, sleep disturbance and suicidal ideas. The patient is nervous/anxious. The patient is not hyperactive.      Objective:     Vital Signs (Most Recent):  Temp: 98 °F (36.7 °C) (01/07/24 0715)  Pulse: 88 (01/07/24 0715)  Resp: 17 (01/07/24 0715)  BP: 116/62 (01/07/24 0715)  SpO2: 98 % (01/07/24 0715) Vital Signs (24h Range):  Temp:  [97.6 °F (36.4  °C)-98.2 °F (36.8 °C)] 98 °F (36.7 °C)  Pulse:  [62-88] 88  Resp:  [9-26] 17  SpO2:  [96 %-100 %] 98 %  BP: (101-136)/(59-87) 116/62     Weight: 71.2 kg (156 lb 15.5 oz)  Body mass index is 26.12 kg/m².     Physical Exam  Constitutional:       General: He is not in acute distress.     Appearance: He is well-developed. He is ill-appearing. He is not diaphoretic.      Comments: Family at bedside.   HENT:      Head: Normocephalic and atraumatic.      Nose: Nose normal.      Mouth/Throat:      Pharynx: No oropharyngeal exudate.   Eyes:      General: No scleral icterus.     Conjunctiva/sclera: Conjunctivae normal.      Pupils: Pupils are equal, round, and reactive to light.   Neck:      Thyroid: No thyromegaly.      Vascular: No JVD.      Trachea: No tracheal deviation.   Cardiovascular:      Rate and Rhythm: Normal rate and regular rhythm.      Heart sounds: Normal heart sounds. No murmur heard.  Pulmonary:      Effort: Pulmonary effort is normal. No respiratory distress.      Breath sounds: Normal breath sounds. No wheezing or rales.   Chest:      Chest wall: No tenderness.   Abdominal:      General: Bowel sounds are normal. There is no distension.      Palpations: Abdomen is soft. There is no mass.      Tenderness: There is no abdominal tenderness. There is no guarding or rebound.   Musculoskeletal:         General: Swelling (mild swelling over R hip) and tenderness (TTP over R hip with limited ROM due to pain) present.      Cervical back: Normal range of motion and neck supple.      Comments: [place] to Right hip and drain in place.    Lymphadenopathy:      Cervical: No cervical adenopathy.   Skin:     General: Skin is warm and dry.      Findings: Lesion (large are of chronic wounds over bilateral upper and lower extremities w/o eschar or drainage (see pictures under media section)) present. No erythema or rash.      Comments: Bandages to bilateral upper arms.   Neurological:      Mental Status: He is alert and  oriented to person, place, and time.      Cranial Nerves: No cranial nerve deficit.      Motor: No abnormal muscle tone.      Coordination: Coordination normal.      Deep Tendon Reflexes: Reflexes are normal and symmetric. Reflexes normal.   Psychiatric:         Thought Content: Thought content normal.         Judgment: Judgment normal.              CRANIAL NERVES     CN III, IV, VI   Pupils are equal, round, and reactive to light.       Significant Labs: All pertinent labs within the past 24 hours have been reviewed.  Recent Lab Results         01/07/24  0415   01/06/24  1834   01/06/24  1001        Albumin 2.4           ALP 97           ALT 15           Anion Gap 9           AST 22           Baso # 0.01           Basophil % 0.1           BILIRUBIN TOTAL 0.2  Comment: For infants and newborns, interpretation of results should be based  on gestational age, weight and in agreement with clinical  observations.    Premature Infant recommended reference ranges:  Up to 24 hours.............<8.0 mg/dL  Up to 48 hours............<12.0 mg/dL  3-5 days..................<15.0 mg/dL  6-29 days.................<15.0 mg/dL             BUN 16           Calcium 9.7           Chloride 101           CO2 25           Creatinine 1.1           Differential Method Automated           eGFR >60.0           Eos # 0.0           Eosinophil % 0.0           Glucose 111           Gran # (ANC) 6.0           Gran % 75.2           Hematocrit 28.5           Hemoglobin 8.8           Hep B Core Total Ab Reactive           Hepatitis B Surface Ag   Non-reactive         HIV 1/2 Ag/Ab   Non-reactive         Immature Grans (Abs) 0.02  Comment: Mild elevation in immature granulocytes is non specific and   can be seen in a variety of conditions including stress response,   acute inflammation, trauma and pregnancy. Correlation with other   laboratory and clinical findings is essential.             Immature Granulocytes 0.3           Lymph # 1.5            Lymph % 18.4           Magnesium  1.9           MCH 20.0           MCHC 30.9           MCV 65           Mono # 0.5           Mono % 6.0           MPV 7.6           nRBC 0           Phosphorus Level 3.5           Platelet Count 553           Potassium 3.6           PROTEIN TOTAL 9.1           RBC 4.39           RDW 17.7           Sodium 135           Vancomycin, Random     13.3       WBC 7.94                   Significant Imaging: I have reviewed all pertinent imaging results/findings within the past 24 hours.

## 2024-01-07 NOTE — PT/OT/SLP EVAL
"Physical Therapy  Co-Evaluation (OT) and Treatment    Barrington Amin   [id number]    Time Tracking:     PT Received On: 01/07/24   PT Start Time: 1056   PT Stop Time: 1108   PT Total Time (min): 12 min    Billable Minutes: Evaluation 3 and Gait Training 9  minutes    Recommendations:     Therapy Intensity Recommendations at Discharge: Low Intensity Therapy     Equipment Needed After Discharge: rolling walker    Barriers to Discharge: None    Patient Information:     Recent Surgery: Procedure(s) (LRB):  IRRIGATION AND DRAINAGE ABSCESS RIGHT THIGH WITH ARTHROTOMY OF RIGHT HIP, TRIOS, ANTIBOTIC BEADS (Right) 1 Day Post-Op    Diagnosis: Pyogenic arthritis of right hip    Length of Stay: 2 days    General Precautions: Standard, fall  Orthopedic Precautions: RLE weight bearing as tolerated  Brace: N/A    Assessment:     Barrington Amin is a 48 y.o. male admitted to [facility] on 1/5/2024 for Pyogenic arthritis of right hip, underwent R thigh I&D on 1/6/24. Barrington Amin tolerated evaluation well today. Educated pt and spouse on post-op orthopedic precautions (RLE WBAT), verbalized understanding. He was able to ambulate 90 ft in hallways this morning with rolling walker and close stand-by assistance; he uses more of a "hop-to" gait pattern with walker due to reluctance to placing weight onto his RLE post-op. Had him take a few steps with focusing on R heel strike and weight displacement and he seems to tolerated well, no change in his 3/10 pain (consistent throughout session) with ambulation. Has rolling walker in room to use with staff to safely mobilize during this admission. Patient currently demonstrates a need for low intensity therapy on a scheduled basis secondary to a decline in functional status due to surgical procedure. Discussed PT role, POC, and goals with patient and spouse; verbalized understanding. Barrington Amin would benefit from acute PT services to promote mobility during this admission and improve return to PLOF.    Problem " "List: weakness, impaired endurance, impaired self care skills, impaired functional mobility, gait instability, impaired balance, pain, decreased lower extremity function, impaired cardiopulmonary response to activity, orthopedic precautions, decreased ROM    Rehab Prognosis: Good; patient would benefit from acute skilled PT services to address these deficits and reach maximum level of function.    Plan:     Patient to be seen 4 x/week to address the above listed problems via gait training, therapeutic activities, therapeutic exercises, neuromuscular re-education    Plan of Care Expires: 02/06/24  Plan of Care reviewed with: patient, spouse    Subjective:     Communicated with ELIZABETH Champagne prior to evaluation, appropriate to see for evaluation.    Pt found reclined in bedside chair upon PT entry to room, agreeable to evaluation.    Patient commenting: "You want me to try and walk in the hallway? I can try"    Does this patient have any cultural, spiritual, Taoist conflicts given the current situation? Patient has no barriers to learning. Patient verbalizes understanding of his/her program and goals and demonstrates them correctly. No cultural, spiritual, or educational needs identified.    Past Medical History:   Diagnosis Date    Hypertension     History reviewed. No pertinent surgical history.    Living Environment:  Pt lives with his spouse in a 1  with 0 DEANNE; uses a tub/shower combo typically with no seat/bench (spouse does have a tub bench in a closet to use as needed).    PLOF:  Prior to admission, patient was independent with mobility and self-care without devices.    DME:  Patient owns or has access to the following DME: bath bench    Upon discharge, patient will have assistance from spouse.    Objective:     Patient found with: TRINIDAD drain x 1    Pain:  Pain Rating 1: 3/10  Location - Side 1: Right  Location - Orientation 1: generalized  Location 1: hip  Pain Addressed 1: Reposition, Distraction  Pain Rating " "Post-Intervention 1: 3/10    Cognitive Exam:  Patient is oriented to Person, Place, Time, and Situation.  Patient follows 100% of single-step commands.    Sensation:   Intact at BLE to light touch    Lower Extremity Range of Motion:  Right Lower Extremity: WFL actively except hip NT on POD#1  Left Lower Extremity: WFL actively    Lower Extremity Strength:  Right Lower Extremity: grossly 3+/5 via MMT  Left Lower Extremity: WFL    Functional Mobility:    Bed Mobility:  NT; patient sitting up out of bed before and after session    Transfers:  Sit to Stand: Contact-Guard Assist from bedside chair with Rolling walker x 1 trial(s)    Gait:  90 feet in hallways this morning with rolling walker and close stand-by assistance; he uses more of a "hop-to" gait pattern with walker due to reluctance to placing weight onto his RLE post-op.  Had him take a few steps with focusing on R heel strike and weight displacement and he seems to tolerated well, no change in his 3/10 pain (consistent throughout session) with ambulation    Assist level: Stand-By Assist  Device: Rolling walker    Balance:  Static Sit: Independent at edge of bedside chair    Static Stand: Supervision with Rolling walker    AM-PAC 6 CLICK MOBILITY  Turning over in bed (including adjusting bedclothes, sheets and blankets)?: 4  Sitting down on and standing up from a chair with arms (e.g., wheelchair, bedside commode, etc.): 3  Moving from lying on back to sitting on the side of the bed?: 3  Moving to and from a bed to a chair (including a wheelchair)?: 3  Need to walk in hospital room?: 3  Climbing 3-5 steps with a railing?: 2  Basic Mobility Total Score: 18    Patient was left reclined in bedside chair with all lines intact, call button in reach, RN notified, and spouse present.    Clinical Decision Making for Evaluation Complexity:  1. Body System(s) Examination: 1-2  2. Clinical Presentation: Evolving  3. Evaluation Complexity: Low    GOALS:   Multidisciplinary " "Problems       Physical Therapy Goals          Problem: Physical Therapy    Goal Priority Disciplines Outcome Goal Variances Interventions   Physical Therapy Goal     PT, PT/OT      Description: Goals to be met by: 24     Patient will increase functional independence with mobility by performin. Supine to sit with Supervision - Not met  2. Sit to stand transfer with Supervision with rolling walker - Not met  3. Gait x 150 feet with Supervision using Rolling Walker - Not met  4. Ascend/descend 6" curb step with RW and SBA - Not met                     Julio Fisher, PT  2024  "

## 2024-01-07 NOTE — ASSESSMENT & PLAN NOTE
Barrignton Amin is a 48 y.o. male with PMH significant for IVDU presenting with right hip pain and concern for septic arthritis secondary to osteomyelitis in the right acetabulum.  Based on the appearance of the patient's pelvic x-ray/MRI along with clinical correlation, patient would benefit from surgical intervention with a washout/total hip replacement.  However, given the fact that the patient is actively using IV drugs, can only off with a washout at this time. Had discussion with the patient regarding benefits of a washout surgical procedure, and he is amenable.     - WBAT   - IV Abx per primary   - F/u clx's; rare gram + cocci on gram stain   - Multimodal pain control   - Keep drain   - OK for diet   - Repeat CRP tomorrow

## 2024-01-07 NOTE — ANESTHESIA POSTPROCEDURE EVALUATION
Anesthesia Post Evaluation    Patient: Barrington Amin    Procedure(s) Performed: Procedure(s) (LRB):  IRRIGATION AND DRAINAGE ABSCESS RIGHT THIGH WITH ARTHROTOMY OF RIGHT HIP, TRIOS, ANTIBOTIC BEADS (Right)    [facility] Anesthesia Post Op Evaluation      Vitals Value Taken Time   /62 01/07/24 0715   Temp 36.7 °C (98 °F) 01/07/24 0715   Pulse 88 01/07/24 0715   Resp 17 01/07/24 0715   SpO2 98 % 01/07/24 0715         Event Time   Out of Recovery 01/06/2024 10:00:00         Pain/Gopi Score: Pain Rating Prior to Med Admin: 8 (1/7/2024  2:47 AM)  Pain Rating Post Med Admin: 5 (1/6/2024 11:30 AM)  Gopi Score: 10 (1/6/2024 10:00 AM)

## 2024-01-07 NOTE — PROGRESS NOTES
[facility]  Orthopedics  Progress Note    Patient Name: Barrington Amin  MRN: [id number]  Admission Date: 1/5/2024  Hospital Length of Stay: 2 days  Attending Provider: Elaine Brewster MD  Primary Care Provider: Unable, To Obtain  Follow-up For: Procedure(s) (LRB):  IRRIGATION AND DRAINAGE ABSCESS RIGHT THIGH WITH ARTHROTOMY OF RIGHT HIP, TRIOS, ANTIBOTIC BEADS (Right)    Post-Operative Day: 1 Day Post-Op  Subjective:     Principal Problem:Pyogenic arthritis of right hip    Principal Orthopedic Problem: Irrigation and debridement of right hip for septic arthritis     Interval History: NAEON. AF, VSS. Pain is improved from prior to surgery. He has been able to walk unassisted to the bathroom. Tolerating PO.     Review of patient's allergies indicates:  No Known Allergies    Current Facility-Administered Medications   Medication    acetaminophen tablet 1,000 mg    aluminum-magnesium hydroxide-simethicone 200-200-20 mg/5 mL suspension 30 mL    cloNIDine tablet 0.1 mg    dextrose 10% bolus 125 mL 125 mL    dextrose 10% bolus 250 mL 250 mL    folic acid tablet 1 mg    glucagon (human recombinant) injection 1 mg    glucose chewable tablet 16 g    glucose chewable tablet 24 g    hydrOXYzine HCL tablet 50 mg    loperamide capsule 2 mg    LORazepam injection 1 mg    methocarbamoL tablet 750 mg    naloxone 0.4 mg/mL injection 0.02 mg    ondansetron injection 4 mg    oxyCODONE immediate release tablet 5 mg    oxyCODONE immediate release tablet Tab 10 mg    piperacillin-tazobactam (ZOSYN) 4.5 g in dextrose 5 % in water (D5W) 100 mL IVPB (MB+)    pregabalin capsule 75 mg    senna-docusate 8.6-50 mg per tablet 2 tablet    sodium chloride 0.9% flush 10 mL    sodium chloride 0.9% flush 10 mL    thiamine tablet 100 mg    traZODone tablet 100 mg    vancomycin (VANCOCIN) 1,000 mg in dextrose 5 % (D5W) 250 mL IVPB (Vial-Mate)    vancomycin - pharmacy to dose     Objective:     Vital Signs (Most Recent):  Temp: 98 °F (36.7 °C)  "(01/07/24 0715)  Pulse: 88 (01/07/24 0715)  Resp: 17 (01/07/24 0715)  BP: 116/62 (01/07/24 0715)  SpO2: 98 % (01/07/24 0715) Vital Signs (24h Range):  Temp:  [97.6 °F (36.4 °C)-98.2 °F (36.8 °C)] 98 °F (36.7 °C)  Pulse:  [62-88] 88  Resp:  [8-26] 17  SpO2:  [96 %-100 %] 98 %  BP: (101-136)/(59-87) 116/62     Weight: 71.2 kg (156 lb 15.5 oz)  Height: 5' 5" (165.1 cm)  Body mass index is 26.12 kg/m².      Intake/Output Summary (Last 24 hours) at 1/7/2024 0733  Last data filed at 1/7/2024 0651  Gross per 24 hour   Intake 1350 ml   Output 1072.5 ml   Net 277.5 ml        Ortho/SPM Exam  NAD, resting comfortably in bed  A&O x3   Breathing comfortably w/o distress   Extremities WWP      Painless PROM of R hip in flexion, extension, internal, and external rotation   Drain with minimal SS output   Minimal pain with compression of the R hemipelvis and NonTTP of the GT   Compartments soft and compressible      Significant Labs: All pertinent labs within the past 24 hours have been reviewed.    Significant Imaging: I have reviewed and interpreted all pertinent imaging results/findings.  Assessment/Plan:     * Pyogenic arthritis of right hip  Barrington Amin is a 48 y.o. male with PMH significant for IVDU presenting with right hip pain and concern for septic arthritis secondary to osteomyelitis in the right acetabulum.  Based on the appearance of the patient's pelvic x-ray/MRI along with clinical correlation, patient would benefit from surgical intervention with a washout/total hip replacement.  However, given the fact that the patient is actively using IV drugs, can only off with a washout at this time. Had discussion with the patient regarding benefits of a washout surgical procedure, and he is amenable.     - WBAT   - IV Abx per primary   - F/u clx's; rare gram + cocci on gram stain   - Multimodal pain control   - Keep drain   - OK for diet   - Repeat CRP tomorrow       W Ryan Barry MD  Orthopedics  [facility] - Surgery    "

## 2024-01-07 NOTE — NURSING
Nurses Note -- 4 Eyes      1/7/2024   4:39 AM      Skin assessed during: Daily Assessment      [] No Altered Skin Integrity Present    []Prevention Measures Documented      [x] Yes- Altered Skin Integrity Present or Discovered   [x] LDA Added if Not in Epic (Describe Wound)   [] New Altered Skin Integrity was Present on Admit and Documented in LDA   [] Wound Image Taken    Wound Care Consulted? No    Attending Nurse:  Tristin JOHNSON    Second RN/Staff Member:   Madi JOHNSON

## 2024-01-07 NOTE — PLAN OF CARE
Evaluation completed, plan of care established.    Problem: Occupational Therapy  Goal: Occupational Therapy Goal  Description: Goals to be met by: 2/7/24     Patient will increase functional independence with ADLs by performing:    UE Dressing with Supervision.  LE Dressing with Supervision.  Grooming while standing at sink with Supervision.  Toileting from toilet with Supervision for hygiene and clothing management.   Supine to sit with Supervision.  Step transfer with Supervision  Toilet transfer to toilet with Supervision.    Outcome: Ongoing, Progressing

## 2024-01-07 NOTE — ASSESSMENT & PLAN NOTE
-long term use of cocaine, IVDU (heroin, fentanyl), alcohol and tobacco   -last used heroin 4 days ago and cocaine last week   -no signs of withdrawal at current time  he reports, denies any signs now.  -monitor for withdrawal using CIWA and COWS   -prn meds ordered for withdrawal   -counseled cessation and will order nicotine patch

## 2024-01-07 NOTE — ASSESSMENT & PLAN NOTE
-patient follows at [facility] for health maintenance and to start on treatment in future and viral load 1.17 million in the past  -LFT wnl   -erpeat VL pending

## 2024-01-07 NOTE — NURSING
"Attempted to give report to amirah JOHNSON . Amirah stated, " we still don't have " Reservation " Charge Nurse Princess Olivas RN spoked with Amirah .  "

## 2024-01-07 NOTE — CONSULTS
"  [facility]  Adult Nutrition  Consult Note    SUMMARY     Recommendations    1. Continue Regular Diet. Encourage small, frequent meals/snacks.   2. Consider appetite stimulant, if appropriate.   3. Continue Ensure Plus- High Protein (Vanilla) with all meals.   4. Recommend MVI with folic acid and thiamine supplementation.   5. RD following.    Goals: Meet % EEN/EPN by follow-up date.  Nutrition Goal Status: new  Communication of RD Recs: other (comment) (POC)    Assessment and Plan    Nutrition Problem  Inadequate oral intake    Related to (etiology):   Decreased appetite    Signs and Symptoms (as evidenced by):   PO intake 25-50% at meals since admit     Interventions/Recommendations (treatment strategy):  Collaboration of nutrition care with other providers    Nutrition Diagnosis Status:   New         Reason for Assessment    Reason For Assessment: consult  Diagnosis: other (see comments) (Pyogenic arthritis of right hip)  Relevant Medical History: HTN, IVDU  Interdisciplinary Rounds: did not attend  General Information Comments: RD consulted for malnutrition. RD spoke with aptient at bedside reports wt loss 3-4 months UBW 162lbs. PO intake since admit 25-50% at meals. Ensure Plus ordered with all meals; did not receive supplement with breakfast. Pt declined NFPE, RD suspects malnutrition.  Nutrition Discharge Planning: Pending Clinical Course    Nutrition Risk Screen    Nutrition Risk Screen: no indicators present    Nutrition/Diet History    Patient Reported Diet/Restrictions/Preferences: general  Spiritual, Cultural Beliefs, Buddhism Practices, Values that Affect Care: no  Food Allergies: NKFA  Factors Affecting Nutritional Intake: decreased appetite, pain    Anthropometrics    Temp: 98 °F (36.7 °C)  Height Method: Stated  Height: 5' 5" (165.1 cm)  Height (inches): 65 in  Weight Method: Bed Scale  Weight: 71.2 kg (156 lb 15.5 oz)  Weight (lb): 156.97 lb  Ideal Body Weight (IBW), Male: 136 lb  % " Ideal Body Weight, Male (lb): 115.42 %  BMI (Calculated): 26.1       Lab/Procedures/Meds    Pertinent Labs Reviewed: reviewed  Pertinent Medications Reviewed: reviewed  Pertinent Medications Comments: piperacillin, folic acid, thiamine, vancomycin        Estimated/Assessed Needs    Weight Used For Calorie Calculations: 70.8 kg (156 lb)  Energy Calorie Requirements (kcal): 1962 kcal (MSJ x 1.3)  Energy Need Method: Knox-St Jeor  Protein Requirements: 71-85 g/d (1.0-1.2 g/kg)  Weight Used For Protein Calculations: 70.8 kg (156 lb)        RDA Method (mL): 1962         Nutrition Prescription Ordered    Current Diet Order: Regular  Oral Nutrition Supplement: Ensure Plus Vanilla (All meals)    Evaluation of Received Nutrient/Fluid Intake    I/O: +277.5 mL  Comments: LBM: 1/4  % Intake of Estimated Energy Needs: 75 - 100 %  % Meal Intake: 0 - 25 %    Nutrition Risk    Level of Risk/Frequency of Follow-up: moderate (1-2x/ week)       Monitor and Evaluation    Food and Nutrient Intake: energy intake, food and beverage intake  Food and Nutrient Adminstration: diet order  Knowledge/Beliefs/Attitudes: food and nutrition knowledge/skill, beliefs and attitudes  Physical Activity and Function: nutrition-related ADLs and IADLs, factors affecting access to physical activity  Anthropometric Measurements: weight, weight change, body mass index  Biochemical Data, Medical Tests and Procedures: lipid profile, inflammatory profile, glucose/endocrine profile, gastrointestinal profile, electrolyte and renal panel  Nutrition-Focused Physical Findings: skin, head and eyes, extremities, muscles and bones, overall appearance       Nutrition Follow-Up    RD Follow-up?: Yes    Anastasia Mitchell Registration Eligible, Provisional LDN

## 2024-01-07 NOTE — PLAN OF CARE
Pt A&Ox4. Pt's pain is controlled with current medications. Pt is stable and recovery is progressing well, pt  careonging.  Problem: Adult Inpatient Plan of Care  Goal: Plan of Care Review  Outcome: Ongoing, Progressing  Goal: Patient-Specific Goal (Individualized)  Outcome: Ongoing, Progressing  Goal: Absence of Hospital-Acquired Illness or Injury  Outcome: Ongoing, Progressing  Goal: Optimal Comfort and Wellbeing  Outcome: Ongoing, Progressing  Goal: Readiness for Transition of Care  Outcome: Ongoing, Progressing     Problem: Impaired Wound Healing  Goal: Optimal Wound Healing  Outcome: Ongoing, Progressing     Problem: Skin Injury Risk Increased  Goal: Skin Health and Integrity  Outcome: Ongoing, Progressing

## 2024-01-07 NOTE — ASSESSMENT & PLAN NOTE
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 Chronic damage seen and long term will neeed hip replacement if patient can stay sober.  -blood CX with staph aureus  -WBAT, PT/OT pending  Pain control with multimodals  -drain in place  -ID folllowing, on vanc/zosyn now. Appreciate recs. Will need LTAC long term and picc line for antibiotics once bacteremia clears.

## 2024-01-07 NOTE — ASSESSMENT & PLAN NOTE
-chronic in nature w/o signs of active infection   -likely induced by Xylazine (Horse tranquilizer) vs levimasole mixed with heroin by suppliers   -will consult wound care and pending

## 2024-01-07 NOTE — NURSING
Nurses Note -- 4 Eyes      1/7/2024   5:43 PM      Skin assessed during: Q Shift Change      [] No Altered Skin Integrity Present    []Prevention Measures Documented      [x] Yes- Altered Skin Integrity Present or Discovered   [] LDA Added if Not in Epic (Describe Wound)   [x] New Altered Skin Integrity was Present on Admit and Documented in LDA   [] Wound Image Taken    Wound Care Consulted? Yes    Attending Nurse:  Mahi dsouza Lpn    Second RN/Staff Member:     Princess marti RN   Wound care nurse was consulted on admit

## 2024-01-07 NOTE — SUBJECTIVE & OBJECTIVE
"Principal Problem:Pyogenic arthritis of right hip    Principal Orthopedic Problem: Irrigation and debridement of right hip for septic arthritis     Interval History: NAEON. AF, VSS. Pain is improved from prior to surgery. He has been able to walk unassisted to the bathroom. Tolerating PO.     Review of patient's allergies indicates:  No Known Allergies    Current Facility-Administered Medications   Medication    acetaminophen tablet 1,000 mg    aluminum-magnesium hydroxide-simethicone 200-200-20 mg/5 mL suspension 30 mL    cloNIDine tablet 0.1 mg    dextrose 10% bolus 125 mL 125 mL    dextrose 10% bolus 250 mL 250 mL    folic acid tablet 1 mg    glucagon (human recombinant) injection 1 mg    glucose chewable tablet 16 g    glucose chewable tablet 24 g    hydrOXYzine HCL tablet 50 mg    loperamide capsule 2 mg    LORazepam injection 1 mg    methocarbamoL tablet 750 mg    naloxone 0.4 mg/mL injection 0.02 mg    ondansetron injection 4 mg    oxyCODONE immediate release tablet 5 mg    oxyCODONE immediate release tablet Tab 10 mg    piperacillin-tazobactam (ZOSYN) 4.5 g in dextrose 5 % in water (D5W) 100 mL IVPB (MB+)    pregabalin capsule 75 mg    senna-docusate 8.6-50 mg per tablet 2 tablet    sodium chloride 0.9% flush 10 mL    sodium chloride 0.9% flush 10 mL    thiamine tablet 100 mg    traZODone tablet 100 mg    vancomycin (VANCOCIN) 1,000 mg in dextrose 5 % (D5W) 250 mL IVPB (Vial-Mate)    vancomycin - pharmacy to dose     Objective:     Vital Signs (Most Recent):  Temp: 98 °F (36.7 °C) (01/07/24 0715)  Pulse: 88 (01/07/24 0715)  Resp: 17 (01/07/24 0715)  BP: 116/62 (01/07/24 0715)  SpO2: 98 % (01/07/24 0715) Vital Signs (24h Range):  Temp:  [97.6 °F (36.4 °C)-98.2 °F (36.8 °C)] 98 °F (36.7 °C)  Pulse:  [62-88] 88  Resp:  [8-26] 17  SpO2:  [96 %-100 %] 98 %  BP: (101-136)/(59-87) 116/62     Weight: 71.2 kg (156 lb 15.5 oz)  Height: 5' 5" (165.1 cm)  Body mass index is 26.12 kg/m².      Intake/Output Summary (Last " 24 hours) at 1/7/2024 0733  Last data filed at 1/7/2024 0651  Gross per 24 hour   Intake 1350 ml   Output 1072.5 ml   Net 277.5 ml        Ortho/SPM Exam  NAD, resting comfortably in bed  A&O x3   Breathing comfortably w/o distress   Extremities WWP      Painless PROM of R hip in flexion, extension, internal, and external rotation   Drain with minimal SS output   Minimal pain with compression of the R hemipelvis and NonTTP of the GT   Compartments soft and compressible      Significant Labs: All pertinent labs within the past 24 hours have been reviewed.    Significant Imaging: I have reviewed and interpreted all pertinent imaging results/findings.

## 2024-01-07 NOTE — PLAN OF CARE
"Barrington Amin is a 48 y.o. male admitted to [facility] on 2024 for Pyogenic arthritis of right hip, underwent R thigh I&D on 24. Barrington Amin tolerated evaluation well today. Educated pt and spouse on post-op orthopedic precautions (RLE WBAT), verbalized understanding. He was able to ambulate 90 ft in hallways this morning with rolling walker and close stand-by assistance; he uses more of a "hop-to" gait pattern with walker due to reluctance to placing weight onto his RLE post-op. Had him take a few steps with focusing on R heel strike and weight displacement and he seems to tolerated well, no change in his 3/10 pain (consistent throughout session) with ambulation. Has rolling walker in room to use with staff to safely mobilize during this admission. Patient currently demonstrates a need for low intensity therapy on a scheduled basis secondary to a decline in functional status due to surgical procedure. Discussed PT role, POC, and goals with patient and spouse; verbalized understanding. Barrington Amin would benefit from acute PT services to promote mobility during this admission and improve return to PLOF.    Problem: Physical Therapy  Goal: Physical Therapy Goal  Description: Goals to be met by: 24     Patient will increase functional independence with mobility by performin. Supine to sit with Supervision - Not met  2. Sit to stand transfer with Supervision with rolling walker - Not met  3. Gait x 150 feet with Supervision using Rolling Walker - Not met  4. Ascend/descend 6" curb step with RW and SBA - Not met  Outcome: Ongoing, Progressing    Julio Fisher, PT  2024  "

## 2024-01-07 NOTE — NURSING
At some point during the shift the pt removed their ACE wrapping and the nurse noticed during their 0400 rounding. New ACE wrappings were applied and the pt was taught the importance of infection control and to keep the wrappings in place. PT care ongoing.

## 2024-01-07 NOTE — ASSESSMENT & PLAN NOTE
2/6 bottles on admit with staph aureus from 1/5  Cx 1/7 pending  -echo pending  -ID following, on vanc/zosyn now.

## 2024-01-07 NOTE — PLAN OF CARE
Plan of care reviewed and updated . Pt AA+O . Pt's pain is managed with medication ordered at this timed . Pt's V/S are as charted . No falls this shift. . Pt is oriented to room and call system . Will continue to monitor .      No

## 2024-01-07 NOTE — ANESTHESIA POSTPROCEDURE EVALUATION
Anesthesia Post Evaluation    Patient: Barrington Amin    Procedure(s) Performed: Procedure(s) (LRB):  IRRIGATION AND DRAINAGE ABSCESS RIGHT THIGH WITH ARTHROTOMY OF RIGHT HIP, TRIOS, ANTIBOTIC BEADS (Right)    Final Anesthesia Type: general      Patient location during evaluation: PACU  Patient participation: Yes- Able to Participate  Level of consciousness: awake and alert  Post-procedure vital signs: reviewed and stable  Pain management: adequate  Airway patency: patent    PONV status at discharge: No PONV  Anesthetic complications: no      Cardiovascular status: blood pressure returned to baseline  Respiratory status: unassisted  Hydration status: euvolemic  Follow-up not needed.              Vitals Value Taken Time   /62 01/07/24 0715   Temp 36.7 °C (98 °F) 01/07/24 0715   Pulse 88 01/07/24 0715   Resp 17 01/07/24 0715   SpO2 98 % 01/07/24 0715         Event Time   Out of Recovery 01/06/2024 10:00:00         Pain/Gopi Score: Pain Rating Prior to Med Admin: 8 (1/7/2024  2:47 AM)  Pain Rating Post Med Admin: 5 (1/6/2024 11:30 AM)  Gopi Score: 10 (1/6/2024 10:00 AM)

## 2024-01-07 NOTE — PT/OT/SLP EVAL
Occupational Therapy   Evaluation    Name: Barrington Amin  MRN: [id number]  Admitting Diagnosis: Pyogenic arthritis of right hip  Recent Surgery: Procedure(s) (LRB):  IRRIGATION AND DRAINAGE ABSCESS RIGHT THIGH WITH ARTHROTOMY OF RIGHT HIP, TRIOS, ANTIBOTIC BEADS (Right) 1 Day Post-Op    Recommendations:     Discharge Recommendations: Low Intensity Therapy  Discharge Equipment Recommendations:  walker, rolling  Barriers to discharge:   None    Assessment:     Barrington Amin is a 48 y.o. male with a medical diagnosis of Pyogenic arthritis of right hip.  He presents with the following performance deficits affecting function: weakness, impaired endurance, impaired self care skills, impaired functional mobility, gait instability, impaired balance, pain, orthopedic precautions, decreased ROM, impaired cardiopulmonary response to activity, decreased lower extremity function.      Rehab Prognosis: Good; patient would benefit from acute skilled OT services to address these deficits and reach maximum level of function.       Plan:     Patient to be seen   to address the above listed problems via self-care/home management, therapeutic activities, therapeutic exercises, neuromuscular re-education  Plan of Care Expires: 01/21/24  Plan of Care Reviewed with: patient, spouse    Subjective     Chief Complaint: No complaints  Patient/Family Comments/goals: Participate with therapy    Occupational Profile:  Living Environment: Pt lives with his spouse, 0STE, t/s combo  Previous level of function: Independent  Roles and Routines: None  Equipment Used at Home: bath bench  Assistance upon Discharge:     Pain/Comfort:  Pain Rating 1: 3/10  Location - Side 1: Right  Location - Orientation 1: generalized  Location 1: hip  Pain Addressed 1: Reposition, Distraction  Pain Rating Post-Intervention 1: 3/10    Patients cultural, spiritual, [employer]aritan conflicts given the current situation: no    Objective:     Communicated with: Nursing prior to session.   Patient found up in chair with TRINIDAD drain upon OT entry to room.    General Precautions: Standard, fall  Orthopedic Precautions: RLE weight bearing as tolerated  Braces: N/A  Respiratory Status: Room air    Occupational Performance:    Bed Mobility:    NT patient seated up in bedside chair     Functional Mobility/Transfers:  Patient completed Sit <> Stand Transfer with contact guard assistance  with  rolling walker   Functional Mobility: 90 ft with rolling walker with close SBA, patient with difficulty consistently accepting weight in L LE    Activities of Daily Living:  Grooming: stand by assistance seated in bedside chair  Upper Body Dressing: stand by assistance seated in bedside chair to don gown behind back  Lower Body Dressing: minimum assistance seated in bedside chair    Cognitive/Visual Perceptual:  Cognitive/Psychosocial Skills:     -       Oriented to: Person, Place, Time, and Situation   -       Follows Commands/attention:Follows multistep  commands  -       Safety awareness/insight to disability: intact   -       Mood/Affect/Coping skills/emotional control: Appropriate to situation    Physical Exam:  Postural examination/scapula alignment:    -       No postural abnormalities identified  Upper Extremity Range of Motion:  -       Right Upper Extremity: WFL  -       Left Upper Extremity: WFL  Upper Extremity Strength:    -       Right Upper Extremity: WFL  -       Left Upper Extremity: WFL   Strength:    -       Right Upper Extremity: WFL  -       Left Upper Extremity: WFL    AMPAC 6 Click ADL:  AMPAC Total Score: 22    Treatment & Education:  -Evaluation completed, plan of care established.  -Patient and family educated on roles/goals of OT and POC.  -White board updated.  -Therapist provided time for questions and answered within scope of practice.  -Patient educated on importance of EOB/OOB activity to maximize recovery.    Patient left up in chair with all lines intact and call button in  reach    GOALS:   Multidisciplinary Problems       Occupational Therapy Goals       Not on file                    History:     Past Medical History:   Diagnosis Date    Hypertension        History reviewed. No pertinent surgical history.    Time Tracking:     OT Date of Treatment: 01/07/24  OT Start Time: 1056  OT Stop Time: 1108  OT Total Time (min): 12 min    Billable Minutes:Evaluation 1 unit  Therapeutic Activity 1 unit    1/7/2024

## 2024-01-07 NOTE — ASSESSMENT & PLAN NOTE
Patient's anemia is currently controlled. Has not received any PRBCs to date. 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  -iron/b12/folate wnl. Labs reviewed and hg stable on 1/7

## 2024-01-07 NOTE — PLAN OF CARE
Recommendations    1. Continue Regular Diet. Encourage small, frequent meals/snacks.   2. Consider appetite stimulant, if appropriate.   3. Continue Ensure Plus- High Protein (Vanilla) with all meals.   4. Recommend MVI with folic acid and thiamine supplementation.   5. RD following.    Goals: Meet % EEN/EPN by follow-up date.  Nutrition Goal Status: new  Communication of RD Recs: other (comment) (POC)
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
## 2024-01-08 LAB
ALBUMIN SERPL BCP-MCNC: 2.4 G/DL (ref 3.5–5.2)
ALP SERPL-CCNC: 85 U/L (ref 55–135)
ALT SERPL W/O P-5'-P-CCNC: 14 U/L (ref 10–44)
ANION GAP SERPL CALC-SCNC: 9 MMOL/L (ref 8–16)
ASCENDING AORTA: 2.96 CM
AST SERPL-CCNC: 22 U/L (ref 10–40)
AV INDEX (PROSTH): 0.97
AV MEAN GRADIENT: 3 MMHG
AV PEAK GRADIENT: 5 MMHG
AV VALVE AREA BY VELOCITY RATIO: 4.45 CM²
AV VALVE AREA: 4.08 CM²
AV VELOCITY RATIO: 1.05
BACTERIA BLD CULT: ABNORMAL
BASOPHILS # BLD AUTO: 0.02 K/UL (ref 0–0.2)
BASOPHILS NFR BLD: 0.2 % (ref 0–1.9)
BILIRUB SERPL-MCNC: 0.2 MG/DL (ref 0.1–1)
BSA FOR ECHO PROCEDURE: 1.8 M2
BUN SERPL-MCNC: 15 MG/DL (ref 6–20)
CALCIUM SERPL-MCNC: 9.7 MG/DL (ref 8.7–10.5)
CHLORIDE SERPL-SCNC: 103 MMOL/L (ref 95–110)
CO2 SERPL-SCNC: 29 MMOL/L (ref 23–29)
CREAT SERPL-MCNC: 1 MG/DL (ref 0.5–1.4)
CRP SERPL-MCNC: 46.7 MG/L (ref 0–8.2)
CV ECHO LV RWT: 0.36 CM
DIFFERENTIAL METHOD BLD: ABNORMAL
DOP CALC AO PEAK VEL: 1.13 M/S
DOP CALC AO VTI: 22.34 CM
DOP CALC LVOT AREA: 4.2 CM2
DOP CALC LVOT DIAMETER: 2.32 CM
DOP CALC LVOT PEAK VEL: 1.19 M/S
DOP CALC LVOT STROKE VOLUME: 91.18 CM3
DOP CALCLVOT PEAK VEL VTI: 21.58 CM
E WAVE DECELERATION TIME: 226.47 MSEC
E/A RATIO: 1.13
E/E' RATIO: 4.5 M/S
ECHO LV POSTERIOR WALL: 0.86 CM (ref 0.6–1.1)
EOSINOPHIL # BLD AUTO: 0 K/UL (ref 0–0.5)
EOSINOPHIL NFR BLD: 0.1 % (ref 0–8)
ERYTHROCYTE [DISTWIDTH] IN BLOOD BY AUTOMATED COUNT: 17.9 % (ref 11.5–14.5)
EST. GFR  (NO RACE VARIABLE): >60 ML/MIN/1.73 M^2
FRACTIONAL SHORTENING: 41 % (ref 28–44)
GLUCOSE SERPL-MCNC: 95 MG/DL (ref 70–110)
HCT VFR BLD AUTO: 28.4 % (ref 40–54)
HCV RNA SERPL NAA+PROBE-LOG IU: 5.74 LOGIU/ML
HCV RNA SERPL QL NAA+PROBE: DETECTED
HCV RNA SPEC NAA+PROBE-ACNC: ABNORMAL IU/ML
HGB BLD-MCNC: 8.4 G/DL (ref 14–18)
IMM GRANULOCYTES # BLD AUTO: 0.02 K/UL (ref 0–0.04)
IMM GRANULOCYTES NFR BLD AUTO: 0.2 % (ref 0–0.5)
INTERVENTRICULAR SEPTUM: 0.92 CM (ref 0.6–1.1)
LA MAJOR: 4.97 CM
LA MINOR: 5.47 CM
LA WIDTH: 4.22 CM
LEFT ATRIUM SIZE: 3.39 CM
LEFT ATRIUM VOLUME INDEX MOD: 30.4 ML/M2
LEFT ATRIUM VOLUME INDEX: 35.6 ML/M2
LEFT ATRIUM VOLUME MOD: 54.16 CM3
LEFT ATRIUM VOLUME: 63.33 CM3
LEFT INTERNAL DIMENSION IN SYSTOLE: 2.81 CM (ref 2.1–4)
LEFT VENTRICLE DIASTOLIC VOLUME INDEX: 58.78 ML/M2
LEFT VENTRICLE DIASTOLIC VOLUME: 104.63 ML
LEFT VENTRICLE MASS INDEX: 80 G/M2
LEFT VENTRICLE SYSTOLIC VOLUME INDEX: 16.7 ML/M2
LEFT VENTRICLE SYSTOLIC VOLUME: 29.69 ML
LEFT VENTRICULAR INTERNAL DIMENSION IN DIASTOLE: 4.74 CM (ref 3.5–6)
LEFT VENTRICULAR MASS: 142.6 G
LV LATERAL E/E' RATIO: 3.86 M/S
LV SEPTAL E/E' RATIO: 5.4 M/S
LYMPHOCYTES # BLD AUTO: 1.5 K/UL (ref 1–4.8)
LYMPHOCYTES NFR BLD: 16.8 % (ref 18–48)
MCH RBC QN AUTO: 20 PG (ref 27–31)
MCHC RBC AUTO-ENTMCNC: 29.6 G/DL (ref 32–36)
MCV RBC AUTO: 68 FL (ref 82–98)
MONOCYTES # BLD AUTO: 0.6 K/UL (ref 0.3–1)
MONOCYTES NFR BLD: 6.6 % (ref 4–15)
MV PEAK A VEL: 0.48 M/S
MV PEAK E VEL: 0.54 M/S
NEUTROPHILS # BLD AUTO: 6.6 K/UL (ref 1.8–7.7)
NEUTROPHILS NFR BLD: 76.1 % (ref 38–73)
NRBC BLD-RTO: 0 /100 WBC
PISA TR MAX VEL: 2.68 M/S
PLATELET # BLD AUTO: 560 K/UL (ref 150–450)
PMV BLD AUTO: 8 FL (ref 9.2–12.9)
POTASSIUM SERPL-SCNC: 3.5 MMOL/L (ref 3.5–5.1)
PROT SERPL-MCNC: 8.9 G/DL (ref 6–8.4)
RA MAJOR: 4.42 CM
RA PRESSURE ESTIMATED: 3 MMHG
RA WIDTH: 3.93 CM
RBC # BLD AUTO: 4.2 M/UL (ref 4.6–6.2)
RIGHT VENTRICULAR END-DIASTOLIC DIMENSION: 4.28 CM
RV TB RVSP: 6 MMHG
SINUS: 3.42 CM
SODIUM SERPL-SCNC: 141 MMOL/L (ref 136–145)
STJ: 2.65 CM
TDI LATERAL: 0.14 M/S
TDI SEPTAL: 0.1 M/S
TDI: 0.12 M/S
TR MAX PG: 29 MMHG
TRICUSPID ANNULAR PLANE SYSTOLIC EXCURSION: 1.93 CM
TV REST PULMONARY ARTERY PRESSURE: 32 MMHG
VANCOMYCIN TROUGH SERPL-MCNC: 19.5 UG/ML (ref 10–22)
VANCOMYCIN TROUGH SERPL-MCNC: 51.2 UG/ML (ref 10–22)
WBC # BLD AUTO: 8.65 K/UL (ref 3.9–12.7)
Z-SCORE OF LEFT VENTRICULAR DIMENSION IN END DIASTOLE: -0.37
Z-SCORE OF LEFT VENTRICULAR DIMENSION IN END SYSTOLE: -0.63

## 2024-01-08 PROCEDURE — 63600175 PHARM REV CODE 636 W HCPCS: Performed by: HOSPITALIST

## 2024-01-08 PROCEDURE — 25000003 PHARM REV CODE 250

## 2024-01-08 PROCEDURE — 27000207 HC ISOLATION

## 2024-01-08 PROCEDURE — 25000003 PHARM REV CODE 250: Performed by: HOSPITALIST

## 2024-01-08 PROCEDURE — 85025 COMPLETE CBC W/AUTO DIFF WBC: CPT | Performed by: HOSPITALIST

## 2024-01-08 PROCEDURE — 63600175 PHARM REV CODE 636 W HCPCS: Performed by: STUDENT IN AN ORGANIZED HEALTH CARE EDUCATION/TRAINING PROGRAM

## 2024-01-08 PROCEDURE — 94761 N-INVAS EAR/PLS OXIMETRY MLT: CPT

## 2024-01-08 PROCEDURE — 86140 C-REACTIVE PROTEIN: CPT

## 2024-01-08 PROCEDURE — 25000003 PHARM REV CODE 250: Performed by: STUDENT IN AN ORGANIZED HEALTH CARE EDUCATION/TRAINING PROGRAM

## 2024-01-08 PROCEDURE — 36415 COLL VENOUS BLD VENIPUNCTURE: CPT | Mod: XB | Performed by: HOSPITALIST

## 2024-01-08 PROCEDURE — 80202 ASSAY OF VANCOMYCIN: CPT | Mod: 91 | Performed by: HOSPITALIST

## 2024-01-08 PROCEDURE — 11000001 HC ACUTE MED/SURG PRIVATE ROOM

## 2024-01-08 PROCEDURE — 80053 COMPREHEN METABOLIC PANEL: CPT | Performed by: HOSPITALIST

## 2024-01-08 PROCEDURE — 99233 SBSQ HOSP IP/OBS HIGH 50: CPT | Mod: ,,, | Performed by: PHYSICIAN ASSISTANT

## 2024-01-08 RX ORDER — DICYCLOMINE HYDROCHLORIDE 10 MG/1
10 CAPSULE ORAL 4 TIMES DAILY PRN
Status: DISCONTINUED | OUTPATIENT
Start: 2024-01-08 | End: 2024-01-12 | Stop reason: HOSPADM

## 2024-01-08 RX ADMIN — ACETAMINOPHEN 1000 MG: 325 TABLET ORAL at 04:01

## 2024-01-08 RX ADMIN — THERA TABS 1 TABLET: TAB at 09:01

## 2024-01-08 RX ADMIN — METHOCARBAMOL 750 MG: 750 TABLET ORAL at 08:01

## 2024-01-08 RX ADMIN — COLLAGENASE SANTYL: 250 OINTMENT TOPICAL at 04:01

## 2024-01-08 RX ADMIN — METRONIDAZOLE 500 MG: 500 TABLET ORAL at 09:01

## 2024-01-08 RX ADMIN — LORAZEPAM 1 MG: 2 INJECTION INTRAMUSCULAR; INTRAVENOUS at 02:01

## 2024-01-08 RX ADMIN — ACETAMINOPHEN 1000 MG: 325 TABLET ORAL at 11:01

## 2024-01-08 RX ADMIN — PREGABALIN 75 MG: 50 CAPSULE ORAL at 09:01

## 2024-01-08 RX ADMIN — VANCOMYCIN HYDROCHLORIDE 1000 MG: 1 INJECTION, POWDER, LYOPHILIZED, FOR SOLUTION INTRAVENOUS at 02:01

## 2024-01-08 RX ADMIN — METHOCARBAMOL 750 MG: 750 TABLET ORAL at 02:01

## 2024-01-08 RX ADMIN — FOLIC ACID 1 MG: 1 TABLET ORAL at 09:01

## 2024-01-08 RX ADMIN — VANCOMYCIN HYDROCHLORIDE 1000 MG: 1 INJECTION, POWDER, LYOPHILIZED, FOR SOLUTION INTRAVENOUS at 04:01

## 2024-01-08 RX ADMIN — ACETAMINOPHEN 1000 MG: 325 TABLET ORAL at 09:01

## 2024-01-08 RX ADMIN — METRONIDAZOLE 500 MG: 500 TABLET ORAL at 08:01

## 2024-01-08 RX ADMIN — OXYCODONE HYDROCHLORIDE 10 MG: 10 TABLET ORAL at 04:01

## 2024-01-08 RX ADMIN — TRAZODONE HYDROCHLORIDE 100 MG: 100 TABLET ORAL at 02:01

## 2024-01-08 RX ADMIN — METHOCARBAMOL 750 MG: 750 TABLET ORAL at 09:01

## 2024-01-08 RX ADMIN — OXYCODONE HYDROCHLORIDE 10 MG: 10 TABLET ORAL at 11:01

## 2024-01-08 RX ADMIN — CEFEPIME 2 G: 2 INJECTION, POWDER, FOR SOLUTION INTRAVENOUS at 05:01

## 2024-01-08 RX ADMIN — CEFEPIME 2 G: 2 INJECTION, POWDER, FOR SOLUTION INTRAVENOUS at 02:01

## 2024-01-08 RX ADMIN — Medication 100 MG: at 09:01

## 2024-01-08 RX ADMIN — PREGABALIN 75 MG: 50 CAPSULE ORAL at 08:01

## 2024-01-08 NOTE — ASSESSMENT & PLAN NOTE
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 Chronic damage seen and long term will neeed hip replacement if patient can stay sober.  -blood CX with staph aureus  -WBAT, PT/OT with low intensity therapy. Plan for ltac  Pain control with multimodals and controlled  -drain in place  -ID folllowing, on vanc/cefepime now. Vanc trough 51. Appreciate recs. Will need LTAC long term and picc line for antibiotics once bacteremia clears.

## 2024-01-08 NOTE — ASSESSMENT & PLAN NOTE
2/6 bottles on admit with staph aureus from 1/5  Cx 1/7 NG so far  -echo pending  -ID following, on vanc/zosyn now.

## 2024-01-08 NOTE — PROGRESS NOTES
Pharmacokinetic Assessment Follow Up: IV Vancomycin    Vancomycin serum concentration assessment(s):    The trough level was drawn incorrectly and cannot be used to guide therapy at this time.    Vancomycin Regimen Plan:    Continue regimen to Vancomycin 1000 mg IV every 12 hours with next serum trough concentration measured at 13:30 prior to 14:00  dose on 1/8/24    Drug levels (last 3 results):  Recent Labs   Lab Result Units 01/06/24  1001 01/08/24  0311   Vancomycin, Random ug/mL 13.3  --    Vancomycin-Trough ug/mL  --  51.2*       Pharmacy will continue to follow and monitor vancomycin.    Please contact pharmacy at extension [phone] for questions regarding this assessment.    Thank you for the consult,   Indra Carroll       Patient brief summary:  Barrington Amin is a 48 y.o. male initiated on antimicrobial therapy with IV Vancomycin for treatment of bone/joint infection    The patient's current regimen is Vancomycin 1000 mg IV every 12 hrs    Drug Allergies:   Review of patient's allergies indicates:  No Known Allergies    Actual Body Weight:   71.2 kg    Renal Function:   Estimated Creatinine Clearance: 79.4 mL/min (based on SCr of 1 mg/dL).,     Dialysis Method (if applicable):  N/A    CBC (last 72 hours):  Recent Labs   Lab Result Units 01/05/24 2017 01/06/24  0351 01/07/24 0415 01/08/24  0311   WBC K/uL 7.65 6.03 7.94 8.65   Hemoglobin g/dL 9.5* 8.1* 8.8* 8.4*   Hematocrit % 32.6* 28.2* 28.5* 28.4*   Platelets K/uL 592* 502* 553* 560*   Gran % % 71.9 62.2 75.2* 76.1*   Lymph % % 20.7 23.1 18.4 16.8*   Mono % % 5.8 12.1 6.0 6.6   Eosinophil % % 0.9 1.8 0.0 0.1   Basophil % % 0.3 0.3 0.1 0.2   Differential Method  Automated Automated Automated Automated       Metabolic Panel (last 72 hours):  Recent Labs   Lab Result Units 01/05/24 2017 01/06/24  0351 01/07/24 0415 01/08/24  0311   Sodium mmol/L 133* 133* 135* 141   Potassium mmol/L 4.8 3.7 3.6 3.5   Chloride mmol/L 98 100 101 103   CO2 mmol/L 23 23 25 29    Glucose mg/dL 85 101 111* 95   BUN mg/dL 13 14 16 15   Creatinine mg/dL 0.8 0.8 1.1 1.0   Albumin g/dL 2.7* 2.3* 2.4* 2.4*   Total Bilirubin mg/dL 0.3 0.4 0.2 0.2   Alkaline Phosphatase U/L 109 98 97 85   AST U/L 26 20 22 22   ALT U/L 19 15 15 14   Magnesium mg/dL 2.0 1.9 1.9  --    Phosphorus mg/dL  --  3.5 3.5  --        Vancomycin Administrations:  vancomycin given in the last 96 hours                     vancomycin (VANCOCIN) 1,000 mg in dextrose 5 % (D5W) 250 mL IVPB (Vial-Mate) (mg) 1,000 mg New Bag 01/08/24 0213     1,000 mg New Bag 01/07/24 1331     1,000 mg New Bag  0233     1,000 mg New Bag 01/06/24 1424    vancomycin 1,500 mg in dextrose 5 % (D5W) 250 mL IVPB (Vial-Mate) (mg) 1,500 mg New Bag 01/05/24 2226                    Microbiologic Results:  Microbiology Results (last 7 days)       Procedure Component Value Units Date/Time    Tissue culture [[id number]]  (Abnormal) Collected: 01/06/24 0851    Order Status: Completed Specimen: Tissue from Hip, Right Updated: 01/08/24 1014     Aerobic Culture - Tissue STAPHYLOCOCCUS AUREUS  Few  For susceptibility see order # [id number]       Gram Stain Result Moderate WBC's      No organisms seen    Tissue culture [[id number]]  (Abnormal) Collected: 01/06/24 0851    Order Status: Completed Specimen: Tissue from Hip, Right Updated: 01/08/24 1010     Aerobic Culture - Tissue STAPHYLOCOCCUS AUREUS  Few  For susceptibility see order # [id number]       Gram Stain Result No WBC's      No organisms seen    Tissue culture [[id number]]  (Abnormal) Collected: 01/06/24 0851    Order Status: Completed Specimen: Tissue from Hip, Right Updated: 01/08/24 1008     Aerobic Culture - Tissue STAPHYLOCOCCUS AUREUS  Few  Susceptibility pending       Gram Stain Result Many WBC's      Rare Gram positive cocci    Blood culture [[id number]] Collected: 01/07/24 0415    Order Status: Completed Specimen: Blood Updated: 01/08/24 0613     Blood Culture, Routine No Growth to date      No Growth  to date    Narrative:      Collection has been rescheduled by SF4 at 01/07/2024 03:59 Reason:   Unable to collect patient is a hard stick rn notified   Collection has been rescheduled by SF4 at 01/07/2024 03:59 Reason:   Unable to collect patient is a hard stick rn notified     Blood culture [[id number]] Collected: 01/07/24 0415    Order Status: Completed Specimen: Blood Updated: 01/08/24 0613     Blood Culture, Routine No Growth to date      No Growth to date    Narrative:      Collection has been rescheduled by SF4 at 01/07/2024 03:59 Reason:   Unable to collect patient is a hard stick rn notified   Collection has been rescheduled by SF4 at 01/07/2024 03:59 Reason:   Unable to collect patient is a hard stick rn notified     Blood culture #1 **CANNOT BE ORDERED STAT** [[id number]] Collected: 01/05/24 2031    Order Status: Completed Specimen: Blood from Peripheral, Antecubital, Left Updated: 01/07/24 2212     Blood Culture, Routine No Growth to date      No Growth to date      No Growth to date    Culture, Anaerobe [[id number]] Collected: 01/06/24 0851    Order Status: Completed Specimen: Incision site from Hip, Right Updated: 01/07/24 1138     Anaerobic Culture Culture in progress    Culture, Anaerobe [[id number]] Collected: 01/06/24 0851    Order Status: Completed Specimen: Incision site from Hip, Right Updated: 01/07/24 1138     Anaerobic Culture Culture in progress    Culture, Anaerobe [[id number]] Collected: 01/06/24 0851    Order Status: Completed Specimen: Incision site from Hip, Right Updated: 01/07/24 1138     Anaerobic Culture Culture in progress    Blood culture [[id number]]  (Abnormal) Collected: 01/05/24 2017    Order Status: Completed Specimen: Blood from Peripheral, Forearm, Right Updated: 01/07/24 0610     Blood Culture, Routine Gram stain aer bottle: Gram positive cocci in clusters resembling Staph      Positive results previously called 01/06/2024      STAPHYLOCOCCUS AUREUS  ID consult required at  [facility].  For susceptibility see order #[id number]      Blood culture #2 **CANNOT BE ORDERED STAT** [[id number]]  (Abnormal) Collected: 01/05/24 2017    Order Status: Completed Specimen: Blood from Peripheral, Forearm, Right Updated: 01/07/24 0609     Blood Culture, Routine Gram stain derrick bottle: Gram positive cocci in clusters resembling Staph      Results called to and read back by: Mahi Mckeon LPN 01/06/2024  14:52      STAPHYLOCOCCUS AUREUS  Susceptibility pending  ID consult required at [facility] and [facility] locations.      Rapid Organism ID by PCR (from Blood culture) [[id number]]  (Abnormal) Collected: 01/05/24 2017    Order Status: Completed Updated: 01/06/24 1624     Enterococcus faecalis Not Detected     Enterococcus faecium Not Detected     Listeria monocytogenes Not Detected     Staphylococcus spp. See species for ID     Staphylococcus aureus Detected     Staphylococcus epidermidis Not Detected     Staphylococcus lugdunensis Not Detected     Streptococcus species Not Detected     Streptococcus agalactiae Not Detected     Streptococcus pneumoniae Not Detected     Streptococcus pyogenes Not Detected     Acinetobacter calcoaceticus/baumannii complex Not Detected     Bacteroides fragilis Not Detected     Enterobacterales Not Detected     Enterobacter cloacae complex Not Detected     Escherichia coli Not Detected     Klebsiella aerogenes Not Detected     Klebsiella oxytoca Not Detected     Klebsiella pneumoniae group Not Detected     Proteus Not Detected     Salmonella sp Not Detected     Serratia marcescens Not Detected     Haemophilus influenzae Not Detected     Neisseria meningtidis Not Detected     Pseudomonas aeruginosa Not Detected     Stenotrophomonas maltophilia Not Detected     Candida albicans Not Detected     Candida auris Not Detected     Candida glabrata Not Detected     Candida krusei Not Detected     Candida parapsilosis Not Detected     Candida  tropicalis Not Detected     Cryptococcus neoformans/gattii Not Detected     CTX-M (ESBL ) Test Not Applicable     IMP (Carbapenem resistant) Test Not Applicable     KPC resistance gene (Carbapenem resistant) Test Not Applicable     mcr-1  Test Not Applicable     mec A/C  Test Not Applicable     mec A/C and MREJ (MRSA) gene Detected     NDM (Carbapenem resistant) Test Not Applicable     OXA-48-like (Carbapenem resistant) Test Not Applicable     van A/B (VRE gene) Test Not Applicable     VIM (Carbapenem resistant) Test Not Applicable    Blood culture [[id number]]     Order Status: Canceled Specimen: Blood     Blood culture [[id number]]     Order Status: Canceled Specimen: Blood     Fungus culture [[id number]] Collected: 01/06/24 0851    Order Status: Sent Specimen: Incision site from Hip, Right Updated: 01/06/24 0909    Fungus culture [[id number]] Collected: 01/06/24 0851    Order Status: Sent Specimen: Incision site from Hip, Right Updated: 01/06/24 0909    Fungus culture [[id number]] Collected: 01/06/24 0851    Order Status: Sent Specimen: Incision site from Hip, Right Updated: 01/06/24 0908    Aerobic culture [[id number]] Collected: 01/06/24 0851    Order Status: Canceled Specimen: Incision site from Hip, Right     Aerobic culture [[id number]] Collected: 01/06/24 0851    Order Status: Canceled Specimen: Incision site from Hip, Right     Aerobic culture [[id number]] Collected: 01/06/24 0851    Order Status: Canceled Specimen: Incision site from Hip, Right

## 2024-01-08 NOTE — NURSING
Report received from off going nurse. Patient resting with eyes closed. Rise and fall of chest noted. No signs of distress noted. Call light in reach. Bed low and locked. Will continue plan of care.

## 2024-01-08 NOTE — ASSESSMENT & PLAN NOTE
-patient follows at [facility] for health maintenance and to start on treatment in future and viral load 1.17 million in the past  -LFT wnl   -erpeat VL pending   Will check genotype as will refer on dc, can do a possible virtual appt while in ltac

## 2024-01-08 NOTE — ASSESSMENT & PLAN NOTE
-long term use of cocaine, IVDU (heroin, fentanyl), alcohol and tobacco   -last used heroin 4 days ago and cocaine last week   -no signs of withdrawal at current time  he reports, denies any signs now.  -monitor for withdrawal using CIWA and COWS   -prn meds ordered for withdrawal   -counseled cessation and will order nicotine patch  Had episode of vomitign 1/7 but he reprots does not feel like withdrawal related.

## 2024-01-08 NOTE — PLAN OF CARE
[facility]  Initial Discharge Assessment       Primary Care Provider: Unable, To Obtain    Admission Diagnosis: IVDU (intravenous drug user) [F19.90]  Septic arthritis of hip [M00.9]  Chest pain [R07.9]  Chronic skin ulcer with fat layer exposed [L98.492]  Pyogenic arthritis of right hip, due to unspecified organism [M00.9]    Admission Date: 1/5/2024  Expected Discharge Date: 1/12/2024    Transition of Care Barriers: (P) Substance Abuse, Mobility    Payor: [employer] / Plan: [employer] ([facility]) / Product Type: Commercial /     Extended Emergency Contact Information  Primary Emergency Contact: farheen evans  Mobile Phone: [phone]  Relation: Spouse  Preferred language: English   needed? No    Discharge Plan A: (P) Long-term acute care facility (LTAC)  Discharge Plan B: (P) [facility]      [facility] [employer] [id number] [place] [address] [place] [id number] [place]  [facility] [address]  Phone: [phone] Fax: [phone]      Initial Assessment (most recent)       Adult Discharge Assessment - 01/08/24 1438          Discharge Assessment    Assessment Type Discharge Planning Assessment     Confirmed/corrected address, phone number and insurance Yes     Confirmed Demographics Correct on Facesheet     Source of Information patient     Communicated CHELSEA with patient/caregiver Yes     Reason For Admission Pyogenic Arthritis of right hip     People in Home spouse     Do you expect to return to your current living situation? Yes     Do you have help at home or someone to help you manage your care at home? Yes     Who are your caregiver(s) and their phone number(s)? Spouse, Germaine Evans [phone]     Prior to hospitilization cognitive status: Unable to Assess     Current cognitive status: Alert/Oriented     Walking or Climbing Stairs Difficulty yes (P)      Walking or Climbing Stairs ambulation difficulty, requires equipment (P)       Mobility Management Wheelchair (P)      Dressing/Bathing Difficulty no (P)      Do you have any problems with: Needs other help (P)      Specify other help Spouse (P)      Home Accessibility wheelchair accessible (P)      Equipment Currently Used at Home wheelchair (P)      Readmission within 30 days? No (P)      Patient currently being followed by outpatient case management? No (P)      Do you currently have service(s) that help you manage your care at home? No (P)      Do you take prescription medications? Yes (P)      Do you have prescription coverage? Yes (P)      Coverage [employer] (P)      Do you have any problems affording any of your prescribed medications? No (P)      Is the patient taking medications as prescribed? yes (P)      Who is going to help you get home at discharge? Spouse (P)      How do you get to doctors appointments? car, drives self;family or friend will provide (P)      Are you on dialysis? No (P)      Do you take coumadin? No (P)      Discharge Plan A Long-term acute care facility (LTAC) (P)      Discharge Plan B Home Health (P)      DME Needed Upon Discharge  none (P)      Discharge Plan discussed with: Patient;Spouse/sig other (P)      Name(s) and Number(s) SpouseGermaine [phone] (P)      Transition of Care Barriers Substance Abuse;Mobility (P)         Physical Activity    On average, how many days per week do you engage in moderate to strenuous exercise (like a brisk walk)? 0 days (P)      On average, how many minutes do you engage in exercise at this level? 0 min (P)         Financial Resource Strain    How hard is it for you to pay for the very basics like food, housing, medical care, and heating? Somewhat hard (P)         Housing Stability    In the last 12 months, was there a time when you were not able to pay the mortgage or rent on time? No (P)      In the last 12 months, how many places have you lived? 1 (P)      In the last 12 months, was there a  time when you did not have a steady place to sleep or slept in a shelter (including now)? No (P)         Transportation Needs    In the past 12 months, has lack of transportation kept you from medical appointments or from getting medications? No (P)      In the past 12 months, has lack of transportation kept you from meetings, work, or from getting things needed for daily living? No (P)         Food Insecurity    Within the past 12 months, you worried that your food would run out before you got the money to buy more. Never true (P)      Within the past 12 months, the food you bought just didn't last and you didn't have money to get more. Never true (P)         Stress    Do you feel stress - tense, restless, nervous, or anxious, or unable to sleep at night because your mind is troubled all the time - these days? To some extent (P)         Social Connections    In a typical week, how many times do you talk on the phone with family, friends, or neighbors? Twice a week (P)      How often do you get together with friends or relatives? Twice a week (P)      How often do you attend Hinduism or Anabaptist services? Never (P)      Do you belong to any clubs or organizations such as Hinduism groups, unions, fraternal or athletic groups, or school groups? No (P)      How often do you attend meetings of the clubs or organizations you belong to? Never (P)      Are you , , , , never , or living with a partner?  (P)         Alcohol Use    Q1: How often do you have a drink containing alcohol? 2-3 times a week (P)      Q2: How many drinks containing alcohol do you have on a typical day when you are drinking? 3 or 4 (P)      Q3: How often do you have six or more drinks on one occasion? Never (P)         OTHER    Name(s) of People in Home Spouse (P)                    DANIEL met with pt and spouse, Jovita at bedside to complete Initial Discharge Planning Assessment. Pt. is calm and cooperative during  assessment. Pt. And spouse live in their home in [place], [place]. The home is a single story home with no steps/stairs. DME: wheelchair. No Home Health, Dialysis or Coumadin. Pt. drives, but spouse will provide transportation. SW noting pt admits to drug use: Cocaine and Fentanyl and Alcohol abuse. Pt declines addiction resources.     Discharge Plan A and Plan B have been determined by review of patient's clinical status, future medical and therapeutic needs, and coverage/benefits for post-acute care in coordination with multidisciplinary team members.     Karen Katz LMSW

## 2024-01-08 NOTE — NURSING
Wound are nurse completed dressing changes    6:40p wound care completed by nurse, no cast padding on unit, replaced with kerlex

## 2024-01-08 NOTE — PLAN OF CARE
Came to bedside as patient reported to wanting to leave AMA. He reports he is ready to leave at this time and that he he does not want to stay anymore. He said he understands that he would risk death, septic shock, and that he would not heal this infection on his own without antibitoics and would end up back in a hospital or risk death if he were to leave. I asked him what his wife thought about this and he said call her. As we were taking about this she came in and she was very upset that he said he wanted to leave. He threw his phone across the room onto the wall. His wife was pleading with him not to leave as she understood the consequences of leaving at this time that it would be likely deadly. He continud to express the desire to leave but ultimately his wife was able to convince him to stay at this time. She reports that his vomiting yesterday was withdrawals as he previously denied but she reports shaking and tremors as well yesterday. He initially declined IV ativan today but he is now amenable to taking this to help with withdrawal. I messaged nursing to update her and charge nurse on the situation and to give patient a dose of IV ativan now. He denies current GI symptoms- denies nausea vomiting or gi distress right now. There are prns if he develops this such as bentyl, immodium, compazine, zofran and have ativan q4 and if symptoms worsen can consider a low dose valium if need given history of alcohol use as well as opiates also may benefit from a taper if responds to ativan. He reports if his wife leaves he will leave am and she reports she will stay here with him.  High risk of leaving AMA still and placed filled out counseling forms in paper chart in case he decides to do so.

## 2024-01-08 NOTE — CONSULTS
[facility]  Wound Care    Patient Name:  Barrington Amin   MRN:  [id number]  Date: 1/8/2024  Diagnosis: Pyogenic arthritis of right hip    History:     Past Medical History:   Diagnosis Date    Hypertension        Social History     Socioeconomic History    Marital status:    Tobacco Use    Smoking status: Never    Smokeless tobacco: Never   Substance and Sexual Activity    Alcohol use: Never    Drug use: Never       Precautions:     Allergies as of 01/05/2024    (No Known Allergies)       [facility] Assessment Details/Treatment   Patient seen for wound care consultation.   Reviewed chart for this encounter.   See Flow Sheet for findings.    Pt up in chair and agreeable to assessment. Ulcerations to the bilateral forearms from IVDU. The right forearm ulceration is pink, red and moist with slough and eschar present. The left forearm ulceration is pink, red and moist with slough present. The periwounds are intact and dry. The wounds were cleansed with vashe, xeroform applied to the wound bed, covered with gauze and secured with cast padding. The BLE are intact and dry.     RECOMMENDATIONS:  - Bilateral arm ulcerations: bedside nursing to cleanse with vashe, apply a nickel thick layer of santyl (collagenase) to the wound bed, cover with xeroform then gauze and secure with cast padding daily   - Turning every 2 hours  - Bedside nursing to maintain pressure injury prevention interventions     01/08/24 1117        Altered Skin Integrity 01/05/24 1942 Left lower;posterior Arm Ulceration   Date First Assessed/Time First Assessed: 01/05/24 1942   Altered Skin Integrity Present on Admission - Did Patient arrive to the hospital with altered skin?: yes  Side: Left  Orientation: lower;posterior  Location: Arm  Primary Wound Type: (c) Ulceration   Wound Image    Dressing Appearance Intact;Moist drainage   Drainage Amount Moderate   Drainage Characteristics/Odor Purulent;Tan   Appearance Pink;Red;Moist;Slough   Periwound Area  Intact;Dry   Wound Length (cm) 13 cm   Wound Width (cm) 6 cm   Wound Depth (cm) 0.3 cm   Wound Volume (cm^3) 23.4 cm^3   Wound Surface Area (cm^2) 78 cm^2   Care Cleansed with:;Wound cleanser  (vashe)   Dressing Removed;Non-adherent;Absorptive Pad;Applied;Other (comment);Gauze;Cast padding  (xeroform)        Altered Skin Integrity 01/05/24 1943 Right lower;posterior Arm Ulceration   Date First Assessed/Time First Assessed: 01/05/24 1943   Altered Skin Integrity Present on Admission - Did Patient arrive to the hospital with altered skin?: yes  Side: Right  Orientation: lower;posterior  Location: Arm  Primary Wound Type: (c) Ulcera...   Wound Image    Dressing Appearance Intact;Moist drainage   Drainage Amount Moderate   Drainage Characteristics/Odor Purulent;Tan   Appearance Pink;Red;Moist;Eschar;Slough   Tissue loss description Full thickness   Periwound Area Intact;Pink;Dry;Scar tissue   Wound Edges Jagged   Wound Length (cm) 4.9 cm   Wound Width (cm) 4.8 cm   Wound Depth (cm) 0.6 cm   Wound Volume (cm^3) 14.112 cm^3   Wound Surface Area (cm^2) 23.52 cm^2   Care Cleansed with:;Wound cleanser  (vashe)   Dressing Removed;Non-adherent;Applied;Other (comment);Absorptive Pad;Cast padding  (xeroform)       Recommendations made to primary team for above plan via secure chat. Orders placed. Wound care will follow-up as needed.     01/08/2024

## 2024-01-08 NOTE — PT/OT/SLP PROGRESS
Occupational Therapy      Patient Name:  Barrington Amin   MRN:  [id number]    Patient not seen today secondary to refusing & wanting to rest .Pt requested for OT to re-attempt at 10am tomorrow. Will follow-up 01/09/2024.    1/8/2024

## 2024-01-08 NOTE — NURSING
Patient was threatening to leave AMA, nurse, MD and wife spoke with patient on this matter. As of now he is staying. MD signed AMA formed and placed on chart if he decides to really leave

## 2024-01-08 NOTE — PLAN OF CARE
01/08/24 1422   Post-Acute Status   Post-Acute Authorization Placement   Post-Acute Placement Status Referrals Sent   Patient choice form signed by patient/caregiver List with quality metrics by geographic area provided;List from System Post-Acute Care   Discharge Plan   Discharge Plan A Long-term acute care facility (LTAC)   Discharge Plan B Home Health     Pt. Information sent via [employer] to: [facility], [facility] for review for placement.     Discharge Plan A and Plan B have been determined by review of patient's clinical status, future medical and therapeutic needs, and coverage/benefits for post-acute care in coordination with multidisciplinary team members.     Karen Katz LMSW

## 2024-01-08 NOTE — ASSESSMENT & PLAN NOTE
Barrington Amin is a 48 y.o. male with PMH significant for IVDU presenting with right hip pain and concern for septic arthritis secondary to osteomyelitis in the right acetabulum.  Based on the appearance of the patient's pelvic x-ray/MRI along with clinical correlation, patient would benefit from surgical intervention with a washout/total hip replacement.  However, given the fact that the patient is actively using IV drugs, can only off with a washout at this time. Had discussion with the patient regarding benefits of a washout surgical procedure, and he is amenable.     - WBAT   - IV Abx per primary   - F/u clx's; rare gram + cocci on gram stain   - Multimodal pain control   - Keep drain   - OK for diet     » Dipso: f/u ID recs

## 2024-01-08 NOTE — SUBJECTIVE & OBJECTIVE
Interval history- he had some vomiting yesterday that improved overnight and no further episodes. He reports he does not think it feels like any opiate withdrawal as has not had abdominal pain and stool has been formed and discussed signs to watch for further. CRp improved at 46 on labs and hg steady in 8;s. Vanc trough elevated at 51. Discussed outpatient hep c clinic referral and he is amenable. Will add hep C genotype for labs tomorrow. Repeat blood CX negative so far for staph and awaiting wound cx with some gpc seen so far. Echo pending given staph aureus in blood and ID following. He denies new issues this morning ,he reports intake has been spotty at times and has boost on his tray this morning and encouraged this for snack if food isnt appetizing or can order [employer] eats if he doesn't like the food here. Fam can bring food in from [facility] as he had wendys the other day and he was eating some of that. SW to start ltach referrals.      No current facility-administered medications on file prior to encounter.     Current Outpatient Medications on File Prior to Encounter   Medication Sig    traZODone (DESYREL) 100 MG tablet Take 100 mg by mouth every evening.    mirtazapine (REMERON) 30 MG tablet Take 30 mg by mouth.    phenytoin (DILANTIN) 100 MG ER capsule Take 100 mg by mouth.     Family History    None       Tobacco Use    Smoking status: Never    Smokeless tobacco: Never   Substance and Sexual Activity    Alcohol use: Never    Drug use: Never    Sexual activity: Not on file     Review of Systems   Constitutional:  Positive for fatigue. Negative for activity change, appetite change, chills, diaphoresis, fever and unexpected weight change.   HENT:  Negative for congestion, dental problem, drooling, ear discharge, ear pain, facial swelling, hearing loss, mouth sores, nosebleeds, postnasal drip, rhinorrhea, sinus pressure, sneezing, sore throat, tinnitus, trouble swallowing and voice change.    Eyes:  Negative for  photophobia, pain, discharge, redness, itching and visual disturbance.   Respiratory:  Negative for apnea, cough, choking, chest tightness, shortness of breath, wheezing and stridor.    Cardiovascular:  Negative for chest pain, palpitations and leg swelling.   Gastrointestinal:  Negative for abdominal distention, abdominal pain, anal bleeding, blood in stool, constipation, diarrhea, nausea, rectal pain and vomiting.   Endocrine: Negative for cold intolerance, heat intolerance, polydipsia, polyphagia and polyuria.   Genitourinary:  Negative for decreased urine volume, difficulty urinating, dysuria, enuresis, flank pain, frequency, genital sores, hematuria, penile discharge, penile pain, penile swelling, scrotal swelling, testicular pain and urgency.   Musculoskeletal:  Positive for arthralgias (right hip pain) and joint swelling (R hip swelling). Negative for back pain, gait problem, myalgias, neck pain and neck stiffness.   Skin:  Positive for wound (chronic wound over bilateral upper and lower extremities w/o drainage). Negative for color change, pallor and rash.   Allergic/Immunologic: Negative for environmental allergies, food allergies and immunocompromised state.   Neurological:  Negative for dizziness, tremors, seizures, syncope, facial asymmetry, speech difficulty, weakness, light-headedness, numbness and headaches.   Hematological:  Negative for adenopathy. Does not bruise/bleed easily.   Psychiatric/Behavioral:  Negative for agitation, behavioral problems, confusion, decreased concentration, dysphoric mood, hallucinations, self-injury, sleep disturbance and suicidal ideas. The patient is nervous/anxious. The patient is not hyperactive.      Objective:     Vital Signs (Most Recent):  Temp: 98.1 °F (36.7 °C) (01/08/24 0813)  Pulse: 73 (01/08/24 0813)  Resp: 20 (01/08/24 0813)  BP: 123/83 (01/08/24 0813)  SpO2: 100 % (01/08/24 0813) Vital Signs (24h Range):  Temp:  [97.1 °F (36.2 °C)-98.1 °F (36.7 °C)] 98.1 °F  (36.7 °C)  Pulse:  [63-85] 73  Resp:  [16-20] 20  SpO2:  [96 %-100 %] 100 %  BP: (112-126)/(62-83) 123/83     Weight: 71.2 kg (156 lb 15.5 oz)  Body mass index is 26.12 kg/m².     Physical Exam  Constitutional:       General: He is not in acute distress.     Appearance: He is well-developed. He is ill-appearing. He is not diaphoretic.      Comments: Family at bedside.   HENT:      Head: Normocephalic and atraumatic.      Nose: Nose normal.      Mouth/Throat:      Pharynx: No oropharyngeal exudate.   Eyes:      General: No scleral icterus.     Conjunctiva/sclera: Conjunctivae normal.      Pupils: Pupils are equal, round, and reactive to light.   Neck:      Thyroid: No thyromegaly.      Vascular: No JVD.      Trachea: No tracheal deviation.   Cardiovascular:      Rate and Rhythm: Normal rate and regular rhythm.      Heart sounds: Normal heart sounds. No murmur heard.  Pulmonary:      Effort: Pulmonary effort is normal. No respiratory distress.      Breath sounds: Normal breath sounds. No wheezing or rales.   Chest:      Chest wall: No tenderness.   Abdominal:      General: Bowel sounds are normal. There is no distension.      Palpations: Abdomen is soft. There is no mass.      Tenderness: There is no abdominal tenderness. There is no guarding or rebound.   Musculoskeletal:         General: Swelling (mild swelling over R hip) and tenderness (TTP over R hip with limited ROM due to pain) present.      Cervical back: Normal range of motion and neck supple.      Comments: [place] to Right hip and drain in place.    Lymphadenopathy:      Cervical: No cervical adenopathy.   Skin:     General: Skin is warm and dry.      Findings: Lesion (large are of chronic wounds over bilateral upper and lower extremities w/o eschar or drainage (see pictures under media section)) present. No erythema or rash.      Comments: Bandages to bilateral upper arms.   Neurological:      Mental Status: He is alert and oriented to person, place, and  time.      Cranial Nerves: No cranial nerve deficit.      Motor: No abnormal muscle tone.      Coordination: Coordination normal.      Deep Tendon Reflexes: Reflexes are normal and symmetric. Reflexes normal.   Psychiatric:         Thought Content: Thought content normal.         Judgment: Judgment normal.              CRANIAL NERVES     CN III, IV, VI   Pupils are equal, round, and reactive to light.       Significant Labs: All pertinent labs within the past 24 hours have been reviewed.  Recent Lab Results         01/08/24  0311        Albumin 2.4       ALP 85       ALT 14       Anion Gap 9       AST 22       Baso # 0.02       Basophil % 0.2       BILIRUBIN TOTAL 0.2  Comment: For infants and newborns, interpretation of results should be based  on gestational age, weight and in agreement with clinical  observations.    Premature Infant recommended reference ranges:  Up to 24 hours.............<8.0 mg/dL  Up to 48 hours............<12.0 mg/dL  3-5 days..................<15.0 mg/dL  6-29 days.................<15.0 mg/dL         BUN 15       Calcium 9.7       Chloride 103       CO2 29       Creatinine 1.0       CRP 46.7       Differential Method Automated       eGFR >60.0       Eos # 0.0       Eosinophil % 0.1       Glucose 95       Gran # (ANC) 6.6       Gran % 76.1       Hematocrit 28.4       Hemoglobin 8.4       Immature Grans (Abs) 0.02  Comment: Mild elevation in immature granulocytes is non specific and   can be seen in a variety of conditions including stress response,   acute inflammation, trauma and pregnancy. Correlation with other   laboratory and clinical findings is essential.         Immature Granulocytes 0.2       Lymph # 1.5       Lymph % 16.8       MCH 20.0       MCHC 29.6       MCV 68       Mono # 0.6       Mono % 6.6       MPV 8.0       nRBC 0       Platelet Count 560       Potassium 3.5       PROTEIN TOTAL 8.9       RBC 4.20       RDW 17.9       Sodium 141       Vancomycin-Trough 51.2  Comment:  *Critical value notification by CC with confirmation of receipt to   Chase Jameson RN at Date 1/8/24 Time 4:32AM         WBC 8.65               Significant Imaging: I have reviewed all pertinent imaging results/findings within the past 24 hours.   yes

## 2024-01-08 NOTE — SUBJECTIVE & OBJECTIVE
Principal Problem:Pyogenic arthritis of right hip    Principal Orthopedic Problem: Irrigation and debridement of right hip for septic arthritis     Interval History: NAEON. AF, VSS. CRP at 46 from 112.  Drain in place with 30cc's SS fluid overnight.  Pain is improved from prior to surgery.  He has been able to walk unassisted to the bathroom.     Review of patient's allergies indicates:  No Known Allergies    Current Facility-Administered Medications   Medication    acetaminophen tablet 1,000 mg    aluminum-magnesium hydroxide-simethicone 200-200-20 mg/5 mL suspension 30 mL    ceFEPIme (MAXIPIME) 2 g in dextrose 5 % in water (D5W) 100 mL IVPB (MB+)    cloNIDine tablet 0.1 mg    dextrose 10% bolus 125 mL 125 mL    dextrose 10% bolus 250 mL 250 mL    folic acid tablet 1 mg    glucagon (human recombinant) injection 1 mg    glucose chewable tablet 16 g    glucose chewable tablet 24 g    hydrOXYzine HCL tablet 50 mg    loperamide capsule 2 mg    LORazepam injection 1 mg    methocarbamoL tablet 750 mg    metroNIDAZOLE tablet 500 mg    multivitamin tablet    naloxone 0.4 mg/mL injection 0.02 mg    ondansetron injection 4 mg    oxyCODONE immediate release tablet 5 mg    oxyCODONE immediate release tablet Tab 10 mg    pregabalin capsule 75 mg    prochlorperazine injection Soln 10 mg    senna-docusate 8.6-50 mg per tablet 2 tablet    sodium chloride 0.9% flush 10 mL    sodium chloride 0.9% flush 10 mL    thiamine tablet 100 mg    traZODone tablet 100 mg    vancomycin (VANCOCIN) 1,000 mg in dextrose 5 % (D5W) 250 mL IVPB (Vial-Mate)    vancomycin - pharmacy to dose     Objective:     Vital Signs (Most Recent):  Temp: 98 °F (36.7 °C) (01/07/24 2331)  Pulse: 85 (01/08/24 0502)  Resp: 18 (01/08/24 0502)  BP: 120/77 (01/08/24 0502)  SpO2: 100 % (01/08/24 0502) Vital Signs (24h Range):  Temp:  [97.1 °F (36.2 °C)-98 °F (36.7 °C)] 98 °F (36.7 °C)  Pulse:  [63-88] 85  Resp:  [16-18] 18  SpO2:  [96 %-100 %] 100 %  BP: (112-126)/(62-83)  "120/77     Weight: 71.2 kg (156 lb 15.5 oz)  Height: 5' 5" (165.1 cm)  Body mass index is 26.12 kg/m².      Intake/Output Summary (Last 24 hours) at 1/8/2024 0646  Last data filed at 1/8/2024 0645  Gross per 24 hour   Intake 450 ml   Output 630 ml   Net -180 ml          Ortho/SPM Exam  NAD, resting comfortably in bed  A&O x3   Breathing comfortably w/o distress   Extremities WWP      Painless PROM of R hip in flexion, extension, internal, and external rotation   Drain with minimal SS output   Minimal pain with compression of the R hemipelvis and NonTTP of the GT   Compartments soft and compressible      Significant Labs: All pertinent labs within the past 24 hours have been reviewed.    Significant Imaging: I have reviewed and interpreted all pertinent imaging results/findings.  "
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
## 2024-01-08 NOTE — PROGRESS NOTES
[facility] - [facility]  Orthopedics  Progress Note    Patient Name: Barrington Amin  MRN: [id number]  Admission Date: 1/5/2024  Hospital Length of Stay: 3 days  Attending Provider: Elaine Brewster MD  Primary Care Provider: Unable, To Obtain  Follow-up For: Procedure(s) (LRB):  IRRIGATION AND DRAINAGE ABSCESS RIGHT THIGH WITH ARTHROTOMY OF RIGHT HIP, TRIOS, ANTIBOTIC BEADS (Right)    Post-Operative Day: 2 Days Post-Op  Subjective:     Principal Problem:Pyogenic arthritis of right hip    Principal Orthopedic Problem: Irrigation and debridement of right hip for septic arthritis     Interval History: NAEON. AF, VSS. CRP at 46 from 112.  Drain in place with 30cc's SS fluid overnight.  Pain is improved from prior to surgery.  He has been able to walk unassisted to the bathroom.     Review of patient's allergies indicates:  No Known Allergies    Current Facility-Administered Medications   Medication    acetaminophen tablet 1,000 mg    aluminum-magnesium hydroxide-simethicone 200-200-20 mg/5 mL suspension 30 mL    ceFEPIme (MAXIPIME) 2 g in dextrose 5 % in water (D5W) 100 mL IVPB (MB+)    cloNIDine tablet 0.1 mg    dextrose 10% bolus 125 mL 125 mL    dextrose 10% bolus 250 mL 250 mL    folic acid tablet 1 mg    glucagon (human recombinant) injection 1 mg    glucose chewable tablet 16 g    glucose chewable tablet 24 g    hydrOXYzine HCL tablet 50 mg    loperamide capsule 2 mg    LORazepam injection 1 mg    methocarbamoL tablet 750 mg    metroNIDAZOLE tablet 500 mg    multivitamin tablet    naloxone 0.4 mg/mL injection 0.02 mg    ondansetron injection 4 mg    oxyCODONE immediate release tablet 5 mg    oxyCODONE immediate release tablet Tab 10 mg    pregabalin capsule 75 mg    prochlorperazine injection Soln 10 mg    senna-docusate 8.6-50 mg per tablet 2 tablet    sodium chloride 0.9% flush 10 mL    sodium chloride 0.9% flush 10 mL    thiamine tablet 100 mg    traZODone tablet 100 mg    vancomycin (VANCOCIN) 1,000 mg in dextrose 5  "% (D5W) 250 mL IVPB (Vial-Mate)    vancomycin - pharmacy to dose     Objective:     Vital Signs (Most Recent):  Temp: 98 °F (36.7 °C) (01/07/24 2331)  Pulse: 85 (01/08/24 0502)  Resp: 18 (01/08/24 0502)  BP: 120/77 (01/08/24 0502)  SpO2: 100 % (01/08/24 0502) Vital Signs (24h Range):  Temp:  [97.1 °F (36.2 °C)-98 °F (36.7 °C)] 98 °F (36.7 °C)  Pulse:  [63-88] 85  Resp:  [16-18] 18  SpO2:  [96 %-100 %] 100 %  BP: (112-126)/(62-83) 120/77     Weight: 71.2 kg (156 lb 15.5 oz)  Height: 5' 5" (165.1 cm)  Body mass index is 26.12 kg/m².      Intake/Output Summary (Last 24 hours) at 1/8/2024 0646  Last data filed at 1/8/2024 0645  Gross per 24 hour   Intake 450 ml   Output 630 ml   Net -180 ml         Ortho/SPM Exam  NAD, resting comfortably in bed  A&O x3   Breathing comfortably w/o distress   Extremities WWP      Painless PROM of R hip in flexion, extension, internal, and external rotation   Drain with minimal SS output   Minimal pain with compression of the R hemipelvis and NonTTP of the GT   Compartments soft and compressible      Significant Labs: All pertinent labs within the past 24 hours have been reviewed.    Significant Imaging: I have reviewed and interpreted all pertinent imaging results/findings.  Assessment/Plan:     * Pyogenic arthritis of right hip  aBrrington Amin is a 48 y.o. male with PMH significant for IVDU presenting with right hip pain and concern for septic arthritis secondary to osteomyelitis in the right acetabulum.  Based on the appearance of the patient's pelvic x-ray/MRI along with clinical correlation, patient would benefit from surgical intervention with a washout/total hip replacement.  However, given the fact that the patient is actively using IV drugs, can only off with a washout at this time. Had discussion with the patient regarding benefits of a washout surgical procedure, and he is amenable.     - WBAT   - IV Abx per primary   - F/u clx's; rare gram + cocci on gram stain   - Multimodal pain " control   - Keep drain   - OK for diet     » Dipso: f/u ID recs          SINDI Hall MD  Orthopedics  [facility] - Surgery

## 2024-01-09 PROBLEM — F19.939 DRUG WITHDRAWAL: Status: ACTIVE | Noted: 2024-01-09

## 2024-01-09 LAB
ALBUMIN SERPL BCP-MCNC: 2.5 G/DL (ref 3.5–5.2)
ALP SERPL-CCNC: 90 U/L (ref 55–135)
ALT SERPL W/O P-5'-P-CCNC: 15 U/L (ref 10–44)
ANION GAP SERPL CALC-SCNC: 14 MMOL/L (ref 8–16)
AST SERPL-CCNC: 23 U/L (ref 10–40)
BACTERIA TISS AEROBE CULT: ABNORMAL
BASOPHILS # BLD AUTO: 0.03 K/UL (ref 0–0.2)
BASOPHILS NFR BLD: 0.4 % (ref 0–1.9)
BILIRUB SERPL-MCNC: 0.3 MG/DL (ref 0.1–1)
BUN SERPL-MCNC: 19 MG/DL (ref 6–20)
CALCIUM SERPL-MCNC: 9.5 MG/DL (ref 8.7–10.5)
CHLORIDE SERPL-SCNC: 104 MMOL/L (ref 95–110)
CO2 SERPL-SCNC: 21 MMOL/L (ref 23–29)
CREAT SERPL-MCNC: 1.1 MG/DL (ref 0.5–1.4)
DIFFERENTIAL METHOD BLD: ABNORMAL
EOSINOPHIL # BLD AUTO: 0 K/UL (ref 0–0.5)
EOSINOPHIL NFR BLD: 0.1 % (ref 0–8)
ERYTHROCYTE [DISTWIDTH] IN BLOOD BY AUTOMATED COUNT: 18.1 % (ref 11.5–14.5)
EST. GFR  (NO RACE VARIABLE): >60 ML/MIN/1.73 M^2
GLUCOSE SERPL-MCNC: 78 MG/DL (ref 70–110)
GRAM STN SPEC: ABNORMAL
HCT VFR BLD AUTO: 32.3 % (ref 40–54)
HGB BLD-MCNC: 9.4 G/DL (ref 14–18)
IMM GRANULOCYTES # BLD AUTO: 0.02 K/UL (ref 0–0.04)
IMM GRANULOCYTES NFR BLD AUTO: 0.3 % (ref 0–0.5)
LYMPHOCYTES # BLD AUTO: 1.6 K/UL (ref 1–4.8)
LYMPHOCYTES NFR BLD: 22.4 % (ref 18–48)
MCH RBC QN AUTO: 20.3 PG (ref 27–31)
MCHC RBC AUTO-ENTMCNC: 29.1 G/DL (ref 32–36)
MCV RBC AUTO: 70 FL (ref 82–98)
MONOCYTES # BLD AUTO: 0.6 K/UL (ref 0.3–1)
MONOCYTES NFR BLD: 8.1 % (ref 4–15)
MRSA ID BY PCR: POSITIVE
NEUTROPHILS # BLD AUTO: 4.8 K/UL (ref 1.8–7.7)
NEUTROPHILS NFR BLD: 68.7 % (ref 38–73)
NRBC BLD-RTO: 0 /100 WBC
PLATELET # BLD AUTO: 605 K/UL (ref 150–450)
PMV BLD AUTO: 8 FL (ref 9.2–12.9)
POTASSIUM SERPL-SCNC: 3.7 MMOL/L (ref 3.5–5.1)
PROT SERPL-MCNC: 9 G/DL (ref 6–8.4)
RBC # BLD AUTO: 4.62 M/UL (ref 4.6–6.2)
SODIUM SERPL-SCNC: 139 MMOL/L (ref 136–145)
STAPH AUREUS ID BY PCR: POSITIVE
WBC # BLD AUTO: 6.92 K/UL (ref 3.9–12.7)

## 2024-01-09 PROCEDURE — 25000003 PHARM REV CODE 250: Performed by: STUDENT IN AN ORGANIZED HEALTH CARE EDUCATION/TRAINING PROGRAM

## 2024-01-09 PROCEDURE — 25000003 PHARM REV CODE 250: Performed by: HOSPITALIST

## 2024-01-09 PROCEDURE — 80053 COMPREHEN METABOLIC PANEL: CPT | Performed by: HOSPITALIST

## 2024-01-09 PROCEDURE — 97116 GAIT TRAINING THERAPY: CPT | Mod: CQ

## 2024-01-09 PROCEDURE — 94761 N-INVAS EAR/PLS OXIMETRY MLT: CPT

## 2024-01-09 PROCEDURE — 25000003 PHARM REV CODE 250

## 2024-01-09 PROCEDURE — 99233 SBSQ HOSP IP/OBS HIGH 50: CPT | Mod: ,,, | Performed by: PHYSICIAN ASSISTANT

## 2024-01-09 PROCEDURE — 11000001 HC ACUTE MED/SURG PRIVATE ROOM

## 2024-01-09 PROCEDURE — 87902 NFCT AGT GNTYP ALYS HEP C: CPT | Performed by: HOSPITALIST

## 2024-01-09 PROCEDURE — 85025 COMPLETE CBC W/AUTO DIFF WBC: CPT | Performed by: HOSPITALIST

## 2024-01-09 PROCEDURE — 36415 COLL VENOUS BLD VENIPUNCTURE: CPT | Performed by: HOSPITALIST

## 2024-01-09 PROCEDURE — 63600175 PHARM REV CODE 636 W HCPCS: Performed by: HOSPITALIST

## 2024-01-09 PROCEDURE — 27000207 HC ISOLATION

## 2024-01-09 RX ADMIN — PREGABALIN 75 MG: 50 CAPSULE ORAL at 08:01

## 2024-01-09 RX ADMIN — METHOCARBAMOL 750 MG: 750 TABLET ORAL at 03:01

## 2024-01-09 RX ADMIN — METHOCARBAMOL 750 MG: 750 TABLET ORAL at 08:01

## 2024-01-09 RX ADMIN — THERA TABS 1 TABLET: TAB at 08:01

## 2024-01-09 RX ADMIN — ACETAMINOPHEN 1000 MG: 325 TABLET ORAL at 08:01

## 2024-01-09 RX ADMIN — METRONIDAZOLE 500 MG: 500 TABLET ORAL at 08:01

## 2024-01-09 RX ADMIN — FOLIC ACID 1 MG: 1 TABLET ORAL at 08:01

## 2024-01-09 RX ADMIN — ACETAMINOPHEN 1000 MG: 325 TABLET ORAL at 11:01

## 2024-01-09 RX ADMIN — OXYCODONE HYDROCHLORIDE 10 MG: 10 TABLET ORAL at 08:01

## 2024-01-09 RX ADMIN — Medication 100 MG: at 08:01

## 2024-01-09 RX ADMIN — OXYCODONE HYDROCHLORIDE 10 MG: 10 TABLET ORAL at 02:01

## 2024-01-09 RX ADMIN — VANCOMYCIN HYDROCHLORIDE 1000 MG: 1 INJECTION, POWDER, LYOPHILIZED, FOR SOLUTION INTRAVENOUS at 03:01

## 2024-01-09 RX ADMIN — VANCOMYCIN HYDROCHLORIDE 1000 MG: 1 INJECTION, POWDER, LYOPHILIZED, FOR SOLUTION INTRAVENOUS at 04:01

## 2024-01-09 RX ADMIN — TRAZODONE HYDROCHLORIDE 100 MG: 100 TABLET ORAL at 08:01

## 2024-01-09 RX ADMIN — ACETAMINOPHEN 1000 MG: 325 TABLET ORAL at 03:01

## 2024-01-09 NOTE — ASSESSMENT & PLAN NOTE
Barrington Amin is a 48 y.o. male with PMH significant for IVDU presenting with right hip pain and concern for septic arthritis secondary to osteomyelitis in the right acetabulum.  Based on the appearance of the patient's pelvic x-ray/MRI along with clinical correlation, patient would benefit from surgical intervention with a washout/total hip replacement.  However, given the fact that the patient is actively using IV drugs, can only off with a washout at this time. Had discussion with the patient regarding benefits of a washout surgical procedure, and he is amenable.     - WBAT   - IV Abx per primary   - Multimodal pain control   - Drain to be removed today   - OK for diet     » Dipso: f/u PT/OT progress

## 2024-01-09 NOTE — SUBJECTIVE & OBJECTIVE
Interval history- he reports that he is not going to ltac and he wants to leave again today. Wife at bedside and aware and does not agree with what he is saying or his choices. Charge nurse gave permission for him to go down to smoke if wife with him and return back to floor in 10 minutes and updated nurse jesus alberto on this. I extensively advised him on what could happen if he lives with suboptimal care which is other joints could be infected, becomes bacteremia again, gest endocarditis, gets septic shock, and death. He voiced understanding and wife was present and made him listen to the conversation of all the risk of leaving against medical advise and with a suboptimal inferior treatment regimen (which would be a non IV regimen as he cannot go home with an IV regimen due to IVDU and she understands reasons). Drain removed by ortho. Messaged ID and asked if there is an alternative and inferior regimen given he is refusing ltac and on the verge of leaving ama soon. They are aware that if a regimen is devised it is not standard of care and is inferior and has risks of all the above.  He continues to tell nusring he wants to leave after exam. Papers are filled out in chart if he actually decides to leave but has been threatning since yesterday. He says he is done using iv drugs as I told him a needle will also reinfect him with new bacteria into the skin as this is how he got the infection to start.        No current facility-administered medications on file prior to encounter.     Current Outpatient Medications on File Prior to Encounter   Medication Sig    traZODone (DESYREL) 100 MG tablet Take 100 mg by mouth every evening.    mirtazapine (REMERON) 30 MG tablet Take 30 mg by mouth.    phenytoin (DILANTIN) 100 MG ER capsule Take 100 mg by mouth.     Family History    None       Tobacco Use    Smoking status: Never    Smokeless tobacco: Never   Substance and Sexual Activity    Alcohol use: Never    Drug use: Never     Sexual activity: Not on file     Review of Systems   Constitutional:  Positive for fatigue. Negative for activity change, appetite change, chills, diaphoresis, fever and unexpected weight change.   HENT:  Negative for congestion, dental problem, drooling, ear discharge, ear pain, facial swelling, hearing loss, mouth sores, nosebleeds, postnasal drip, rhinorrhea, sinus pressure, sneezing, sore throat, tinnitus, trouble swallowing and voice change.    Eyes:  Negative for photophobia, pain, discharge, redness, itching and visual disturbance.   Respiratory:  Negative for apnea, cough, choking, chest tightness, shortness of breath, wheezing and stridor.    Cardiovascular:  Negative for chest pain, palpitations and leg swelling.   Gastrointestinal:  Negative for abdominal distention, abdominal pain, anal bleeding, blood in stool, constipation, diarrhea, nausea, rectal pain and vomiting.   Endocrine: Negative for cold intolerance, heat intolerance, polydipsia, polyphagia and polyuria.   Genitourinary:  Negative for decreased urine volume, difficulty urinating, dysuria, enuresis, flank pain, frequency, genital sores, hematuria, penile discharge, penile pain, penile swelling, scrotal swelling, testicular pain and urgency.   Musculoskeletal:  Positive for arthralgias (right hip pain) and joint swelling (R hip swelling). Negative for back pain, gait problem, myalgias, neck pain and neck stiffness.   Skin:  Positive for wound (chronic wound over bilateral upper and lower extremities w/o drainage). Negative for color change, pallor and rash.   Allergic/Immunologic: Negative for environmental allergies, food allergies and immunocompromised state.   Neurological:  Negative for dizziness, tremors, seizures, syncope, facial asymmetry, speech difficulty, weakness, light-headedness, numbness and headaches.   Hematological:  Negative for adenopathy. Does not bruise/bleed easily.   Psychiatric/Behavioral:  Negative for agitation,  behavioral problems, confusion, decreased concentration, dysphoric mood, hallucinations, self-injury, sleep disturbance and suicidal ideas. The patient is nervous/anxious. The patient is not hyperactive.      Objective:     Vital Signs (Most Recent):  Temp: 98 °F (36.7 °C) (01/09/24 0743)  Pulse: 86 (01/09/24 0743)  Resp: 16 (01/09/24 0830)  BP: 120/83 (01/09/24 0743)  SpO2: 100 % (01/09/24 0743) Vital Signs (24h Range):  Temp:  [97.8 °F (36.6 °C)-98.5 °F (36.9 °C)] 98 °F (36.7 °C)  Pulse:  [] 86  Resp:  [16-18] 16  SpO2:  [96 %-100 %] 100 %  BP: (120-127)/(79-83) 120/83     Weight: 70.8 kg (156 lb)  Body mass index is 25.96 kg/m².     Physical Exam  Constitutional:       General: He is not in acute distress.     Appearance: He is well-developed. He is ill-appearing. He is not diaphoretic.      Comments: Agitated. Family at bedside.   HENT:      Head: Normocephalic and atraumatic.      Nose: Nose normal.      Mouth/Throat:      Pharynx: No oropharyngeal exudate.   Eyes:      General: No scleral icterus.     Conjunctiva/sclera: Conjunctivae normal.      Pupils: Pupils are equal, round, and reactive to light.   Neck:      Thyroid: No thyromegaly.      Vascular: No JVD.      Trachea: No tracheal deviation.   Cardiovascular:      Rate and Rhythm: Normal rate and regular rhythm.      Heart sounds: Normal heart sounds. No murmur heard.  Pulmonary:      Effort: Pulmonary effort is normal. No respiratory distress.      Breath sounds: Normal breath sounds. No wheezing or rales.   Chest:      Chest wall: No tenderness.   Abdominal:      General: Bowel sounds are normal. There is no distension.      Palpations: Abdomen is soft. There is no mass.      Tenderness: There is no abdominal tenderness. There is no guarding or rebound.   Musculoskeletal:         General: Swelling (mild swelling over R hip) and tenderness (TTP over R hip with limited ROM due to pain) present.      Cervical back: Normal range of motion and neck  supple.      Comments: [place] to Right hip and drain in place.    Lymphadenopathy:      Cervical: No cervical adenopathy.   Skin:     General: Skin is warm and dry.      Findings: Lesion (large are of chronic wounds over bilateral upper and lower extremities w/o eschar or drainage (see pictures under media section)) present. No erythema or rash.      Comments: Bandages to bilateral upper arms.   Neurological:      Mental Status: He is alert and oriented to person, place, and time.      Cranial Nerves: No cranial nerve deficit.      Motor: No abnormal muscle tone.      Coordination: Coordination normal.      Deep Tendon Reflexes: Reflexes are normal and symmetric. Reflexes normal.   Psychiatric:         Thought Content: Thought content normal.         Judgment: Judgment normal.              CRANIAL NERVES     CN III, IV, VI   Pupils are equal, round, and reactive to light.       Significant Labs: All pertinent labs within the past 24 hours have been reviewed.  Recent Lab Results         01/09/24  0501   01/08/24  1509   01/08/24  1310        Albumin 2.5           ALP 90           ALT 15           Anion Gap 14           Ascending aorta     2.96       Ao peak alen     1.13       Ao VTI     22.34       AST 23           AV valve area     4.08       MANJEET by Velocity Ratio     4.45       AV mean gradient     3       AV index (prosthetic)     0.97       AV peak gradient     5       AV Velocity Ratio     1.05       Baso # 0.03           Basophil % 0.4           BILIRUBIN TOTAL 0.3  Comment: For infants and newborns, interpretation of results should be based  on gestational age, weight and in agreement with clinical  observations.    Premature Infant recommended reference ranges:  Up to 24 hours.............<8.0 mg/dL  Up to 48 hours............<12.0 mg/dL  3-5 days..................<15.0 mg/dL  6-29 days.................<15.0 mg/dL             BSA     1.8       BUN 19           Calcium 9.5           Chloride 104            CO2 21           Creatinine 1.1           Left Ventricle Relative Wall Thickness     0.36       Differential Method Automated           E/A ratio     1.13       E/E' ratio     4.50       eGFR >60.0           Eos # 0.0           Eosinophil % 0.1           E wave deceleration time     226.47       FS     41       Glucose 78           Gran # (ANC) 4.8           Gran % 68.7           Hematocrit 32.3           Hemoglobin 9.4           Immature Grans (Abs) 0.02  Comment: Mild elevation in immature granulocytes is non specific and   can be seen in a variety of conditions including stress response,   acute inflammation, trauma and pregnancy. Correlation with other   laboratory and clinical findings is essential.             Immature Granulocytes 0.3           IVSd     0.92       LA WIDTH     4.22       Left Atrium Major Axis     4.97       Left Atrium Minor Axis     5.47       LA size     3.39       LA volume     63.33            54.16       LA vol index     35.6       LA Volume Index (Mod)     30.4       LVOT area     4.2       LV LATERAL E/E' RATIO     3.86       LV SEPTAL E/E' RATIO     5.40       LV EDV BP     104.63       LV Diastolic Volume Index     58.78       LVIDd     4.74       LVIDs     2.81       LV mass     142.60       LV Mass Index     80       LVOT diameter     2.32       LVOT peak eligio     1.19       LVOT stroke volume     91.18       LVOT peak VTI     21.58       LV ESV BP     29.69       LV Systolic Volume Index     16.7       Lymph # 1.6           Lymph % 22.4           MCH 20.3           MCHC 29.1           MCV 70           Mean e'     0.12       Mono # 0.6           Mono % 8.1           MPV 8.0           MV Peak A Eligio     0.48       MV Peak E Eligio     0.54       nRBC 0           Platelet Count 605           Potassium 3.7           PROTEIN TOTAL 9.0           Posterior Wall     0.86       RA Major Axis     4.42       Est. RA pres     3       RA Width     3.93       RBC 4.62           RDW 18.1            RV TB RVSP     6       RVDD     4.28       Sinus     3.42       Sodium 139           STJ     2.65       TAPSE     1.93       TDI SEPTAL     0.10       TDI LATERAL     0.14       Triscuspid Valve Regurgitation Peak Gradient     29       TR Max Eligio     2.68       TV resting pulmonary artery pressure     32       Vancomycin-Trough   19.5         WBC 6.92           ZLVIDD     -0.37       ZLVIDS     -0.63               Significant Imaging: I have reviewed all pertinent imaging results/findings within the past 24 hours.
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
## 2024-01-09 NOTE — ASSESSMENT & PLAN NOTE
-patient follows at [facility] for health maintenance and to start on treatment in future and viral load 1.17 million in the past  -LFT wnl   -erpeat VL pending and genotype but needs to be off IVDU and complaint to start traetment and is not at this time

## 2024-01-09 NOTE — NURSING
A/Ox4. CIWA/COWS q4h.   Patient reports tingling to RLE, denies numbness.   Urinal bedside. BM today.   Right hip surgical incision island dressing. TRINIDAD drain to right hip; draining serosanguineous, dressing guaze, tegaderm.Bilateral forearm dressings intact, kerlix. Skin dry flaky. Wounds, scabs and scars BLE.   Ambulates RW.   PRN oxycodone per MAR.     Spouse bedside, supportive of patient.

## 2024-01-09 NOTE — ASSESSMENT & PLAN NOTE
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 Chronic damage seen and long term will neeed hip replacement if patient can stay sober.  -blood CX with staph aureus-MRSA  -WBAT, PT/OT with low intensity therapy. Plan for ltac  Pain control with multimodals and controlled  -drain in place  -ID folllowing, on vanc/cefepime now. Vanc trough 51. Appreciate recs. Will need LTAC long term and picc line for antibiotics once bacteremia clears however he is refusing  -see above interval history for extensive counseling on1/8 and 1/9 regarding risk of this. Drain removed now. ID messaged to see if can devise an inferior regimen at this point given he cannot go home with IV antibiotics.

## 2024-01-09 NOTE — SUBJECTIVE & OBJECTIVE
Principal Problem:Pyogenic arthritis of right hip    Principal Orthopedic Problem: Irrigation and debridement of right hip for septic arthritis     Interval History: NAEON. AF, VSS. Pain is reportedly well controlled, and patient reports he has been ambulating well without assistance.  ID recommending PICC line placement and continuing vanc.      Review of patient's allergies indicates:  No Known Allergies    Current Facility-Administered Medications   Medication    acetaminophen tablet 1,000 mg    aluminum-magnesium hydroxide-simethicone 200-200-20 mg/5 mL suspension 30 mL    cloNIDine tablet 0.1 mg    collagenase ointment    dextrose 10% bolus 125 mL 125 mL    dextrose 10% bolus 250 mL 250 mL    dicyclomine capsule 10 mg    folic acid tablet 1 mg    glucagon (human recombinant) injection 1 mg    glucose chewable tablet 16 g    glucose chewable tablet 24 g    hydrOXYzine HCL tablet 50 mg    loperamide capsule 2 mg    LORazepam injection 1 mg    methocarbamoL tablet 750 mg    metroNIDAZOLE tablet 500 mg    multivitamin tablet    naloxone 0.4 mg/mL injection 0.02 mg    ondansetron injection 4 mg    oxyCODONE immediate release tablet 5 mg    oxyCODONE immediate release tablet Tab 10 mg    pregabalin capsule 75 mg    prochlorperazine injection Soln 10 mg    senna-docusate 8.6-50 mg per tablet 2 tablet    sodium chloride 0.9% flush 10 mL    sodium chloride 0.9% flush 10 mL    thiamine tablet 100 mg    traZODone tablet 100 mg    vancomycin (VANCOCIN) 1,000 mg in dextrose 5 % (D5W) 250 mL IVPB (Vial-Mate)    vancomycin - pharmacy to dose     Objective:     Vital Signs (Most Recent):  Temp: 97.8 °F (36.6 °C) (01/09/24 0400)  Pulse: 65 (01/09/24 0400)  Resp: 16 (01/09/24 0400)  BP: 123/81 (01/09/24 0400)  SpO2: 100 % (01/09/24 0400) Vital Signs (24h Range):  Temp:  [97.7 °F (36.5 °C)-98.5 °F (36.9 °C)] 97.8 °F (36.6 °C)  Pulse:  [] 65  Resp:  [16-20] 16  SpO2:  [96 %-100 %] 100 %  BP: (114-127)/(74-83) 123/81  "    Weight: 70.8 kg (156 lb)  Height: 5' 5" (165.1 cm)  Body mass index is 25.96 kg/m².      Intake/Output Summary (Last 24 hours) at 1/9/2024 0551  Last data filed at 1/8/2024 0645  Gross per 24 hour   Intake --   Output 0 ml   Net 0 ml          Ortho/SPM Exam  NAD, resting comfortably in bed  A&O x3   Breathing comfortably w/o distress   Extremities WWP      Painless PROM of R hip in flexion, extension, internal, and external rotation   Drain with minimal SS output   Minimal pain with compression of the R hemipelvis and NonTTP of the GT   Compartments soft and compressible      Significant Labs: All pertinent labs within the past 24 hours have been reviewed.    Significant Imaging: I have reviewed and interpreted all pertinent imaging results/findings.  "

## 2024-01-09 NOTE — SUBJECTIVE & OBJECTIVE
Interval History:   No acute events  Afebrile and WBC WNL  Blood cultures with MRSA - repeats 1/7 now with GPCs  Hip cultures with MRSA  Denies spine pain or retained hardware.  The patient denies any recent fever, chills, or sweats.       Review of Systems   Constitutional:  Negative for chills, diaphoresis and fever.   Respiratory:  Negative for shortness of breath.    Cardiovascular:  Negative for chest pain.   Gastrointestinal:  Negative for abdominal pain, diarrhea, nausea and vomiting.   Genitourinary:  Negative for dysuria and hematuria.   Musculoskeletal:  Positive for arthralgias.   Skin:  Positive for wound.     Objective:     Vital Signs (Most Recent):  Temp: 98.1 °F (36.7 °C) (01/09/24 1557)  Pulse: 68 (01/09/24 1557)  Resp: 18 (01/09/24 1557)  BP: (!) 142/82 (01/09/24 1557)  SpO2: 100 % (01/09/24 1557) Vital Signs (24h Range):  Temp:  [97.8 °F (36.6 °C)-98.5 °F (36.9 °C)] 98.1 °F (36.7 °C)  Pulse:  [] 68  Resp:  [16-18] 18  SpO2:  [96 %-100 %] 100 %  BP: (120-142)/(79-83) 142/82     Weight: 70.8 kg (156 lb)  Body mass index is 25.96 kg/m².    Estimated Creatinine Clearance: 72.2 mL/min (based on SCr of 1.1 mg/dL).     Physical Exam  Vitals and nursing note reviewed.   Constitutional:       General: He is not in acute distress.     Appearance: He is ill-appearing (chronically). He is not toxic-appearing or diaphoretic.       HENT:      Nose: Nose normal. No congestion.      Mouth/Throat:      Mouth: Mucous membranes are moist.      Pharynx: Oropharynx is clear.   Eyes:      General: No scleral icterus.     Conjunctiva/sclera: Conjunctivae normal.   Cardiovascular:      Rate and Rhythm: Normal rate.      Heart sounds: Normal heart sounds. No murmur heard.  Pulmonary:      Effort: Pulmonary effort is normal. No respiratory distress.      Breath sounds: Normal breath sounds.   Abdominal:      General: Abdomen is flat. Bowel sounds are normal. There is no distension.      Palpations: Abdomen is soft.       Tenderness: There is no abdominal tenderness.   Musculoskeletal:         General: Swelling and tenderness present.      Cervical back: Normal range of motion. No rigidity or tenderness.      Right lower leg: No edema.      Left lower leg: No edema.      Comments: R hip / thigh pain, appropriate post-op. No redness, warmth, or fluctuance. Surgical drain in place; draining SS/bloody fluid.    Skin:     General: Skin is warm and dry.      Coloration: Skin is not jaundiced.      Findings: No erythema, lesion or rash.   Neurological:      Mental Status: He is alert and oriented to person, place, and time. Mental status is at baseline.   Psychiatric:         Behavior: Behavior normal.         Thought Content: Thought content normal.          Significant Labs: Blood Culture:   Recent Labs   Lab 01/05/24 2017 01/05/24 2031 01/07/24  0415   LABBLOO Gram stain derrick bottle: Gram positive cocci in clusters resembling Staph  Results called to and read back by: Mahi Mckeon LPN 01/06/2024  14:52  METHICILLIN RESISTANT STAPHYLOCOCCUS AUREUS  ID consult required at [facility].  *  Gram stain aer bottle: Gram positive cocci in clusters resembling Staph  Positive results previously called 01/06/2024  METHICILLIN RESISTANT STAPHYLOCOCCUS AUREUS  ID consult required at [facility].  For susceptibility see order #[id number]  * No Growth to date  No Growth to date  No Growth to date  No Growth to date No Growth to date  No Growth to date  No Growth to date  Gram stain aer bottle: Gram positive cocci in clusters resembling Staph  Results called to and read back by: Glo Cassidy RN 01/09/2024  05:26       CBC:   Recent Labs   Lab 01/08/24 0311 01/09/24  0501   WBC 8.65 6.92   HGB 8.4* 9.4*   HCT 28.4* 32.3*   * 605*       CMP:   Recent Labs   Lab 01/08/24 0311 01/09/24  0501    139   K 3.5 3.7    104   CO2 29 21*   GLU 95 78   BUN 15 19  "  CREATININE 1.0 1.1   CALCIUM 9.7 9.5   PROT 8.9* 9.0*   ALBUMIN 2.4* 2.5*   BILITOT 0.2 0.3   ALKPHOS 85 90   AST 22 23   ALT 14 15   ANIONGAP 9 14       Wound Culture: No results for input(s): "LABAERO" in the last 4320 hours.    Significant Imaging: I have reviewed all pertinent imaging results/findings within the past 24 hours.Transthoracic echo (TTE) complete    Height: 5' 5" (1.651 m)   Weight: 70.8 kg (156 lb)   Blood Pressure: 114/74    Date of Study: 1/8/24   Ordering Provider: Elaine Brewster MD   Clinical Indications: Bacteremia [R78.81 (ICD-10-CM)]       Interpreting Physicians  Performing Staff   Johnnie Landeros MD Tech: Maik Espana        Reason for Exam  Priority: Routine  Dx: Bacteremia [R78.81 (ICD-10-CM)]     View Images Vital Vitrea     Show images for Echo  Summary         Left Ventricle: The left ventricle is normal in size. Normal wall thickness. Normal wall motion. There is normal systolic function with a visually estimated ejection fraction of 60 - 65%. There is normal diastolic function.    Right Ventricle: Normal right ventricular cavity size. Wall thickness is normal. Right ventricle wall motion  is normal. Systolic function is normal.    The left atrium is mildly dilated.    Pulmonary Artery: The estimated pulmonary artery systolic pressure is 32 mmHg.    IVC/SVC: Normal venous pressure at 3 mmHg.     Vitals    Height Weight BMI (Calculated) BSA (Calculated - sq m) BP Pulse   5' 5" (1.651 m) 70.8 kg (156 lb) 26 1.8 sq meters 114/74 65     Study Details A complete echo was performed using complete 2D, color flow Doppler and spectral Doppler. During the study, the apical, parasternal and subcostal views were captured.  Overall the study quality was adequate. This was a portable study performed at the patient's bedside.     Echocardiography Findings    Left Ventricle The left ventricle is normal in size. Normal wall thickness. Normal wall motion. There is normal systolic function " with a visually estimated ejection fraction of 60 - 65%. There is normal diastolic function.   Right Ventricle Normal right ventricular cavity size. Wall thickness is normal. Right ventricle wall motion  is normal. Systolic function is normal.   Left Atrium Left atrium is mildly dilated.   Right Atrium Normal right atrial size.   Aortic Valve The aortic valve is a trileaflet valve. There is normal leaflet mobility. Aortic valve peak velocity is 1.13 m/s. Mean gradient is 3 mmHg.   Mitral Valve The mitral valve is structurally normal. There is normal leaflet mobility.   Tricuspid Valve The tricuspid valve is structurally normal. There is normal leaflet mobility. There is trace regurgitation.   Pulmonic Valve The pulmonic valve is structurally normal. There is normal leaflet mobility. There is trace regurgitation.   IVC/SVC Normal venous pressure at 3 mmHg.   Ascending Aorta Aortic root is normal in size measuring 3.42 cm. Ascending aorta is normal measuring 2.96 cm.   Pericardium and Other Findings There is no pericardial effusion.   Pulmonary Artery The estimated pulmonary artery systolic pressure is 32 mmHg.     Exam Details    Performed Procedure Technologist     Transthoracic echo (TTE) Maik Cortez             Begin Exam End Exam     1/8/2024 12:30 PM CST 1/8/2024  1:10 PM CST            Procedure Name Study Review Link PACS Link Status Accession Number Location    01/04/24 02:41 PM MRI Pelvis Without Contrast Study Review  Images Final [id number] [facility]   01/04/24 07:31 AM X-Ray Hip 2 or 3 views Right (with Pelvis when performed) Study Review  Images Final [id number] [facility]   01/08/24 01:10 PM Echo Study Review  Images Final [id number] [facility]     2023    Date Procedure Name Study Review Link PACS Link Status Accession Number Location   12/23/23 09:27 AM X-Ray Hip 2 or 3 views Left (with Pelvis when performed) Study Review  Final
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
## 2024-01-09 NOTE — NURSING
Nurses Note -- 4 Eyes      1/9/2024   5:43 PM      Skin assessed during: Q Shift Change      [] No Altered Skin Integrity Present    []Prevention Measures Documented      [x] Yes- Altered Skin Integrity Present or Discovered   [] LDA Added if Not in Epic (Describe Wound)   [x] New Altered Skin Integrity was Present on Admit and Documented in LDA   [] Wound Image Taken    Wound Care Consulted? No    Attending Nurse:  Princess Bland RN    Second RN/Staff Member:   PINKY Lantigua

## 2024-01-09 NOTE — PT/OT/SLP PROGRESS
"Physical Therapy Treatment    Patient Name:  Barrington Amin   MRN:  [id number]    Recommendations:     Discharge Recommendations: Low Intensity Therapy  Discharge Equipment Recommendations: walker, rolling  Barriers to discharge: None    Assessment:     Barrington Amin is a 48 y.o. male admitted with a medical diagnosis of Pyogenic arthritis of right hip.  He presents with the following impairments/functional limitations: weakness, impaired endurance, impaired self care skills, impaired functional mobility, gait instability, decreased lower extremity function, impaired balance, decreased safety awareness, pain, orthopedic precautions Pt tolerated treatment session well today. Patient remains appropriate for continued skilled services within the acute environment and goals remain appropriate.   .    Rehab Prognosis: Good; patient would benefit from acute skilled PT services to address these deficits and reach maximum level of function.    Recent Surgery: Procedure(s) (LRB):  IRRIGATION AND DRAINAGE ABSCESS RIGHT THIGH WITH ARTHROTOMY OF RIGHT HIP, TRIOS, ANTIBOTIC BEADS (Right) 3 Days Post-Op    Plan:     During this hospitalization, patient to be seen 4 x/week to address the identified rehab impairments via gait training, therapeutic activities, therapeutic exercises, neuromuscular re-education and progress toward the following goals:    Plan of Care Expires:  02/06/24    Subjective     Chief Complaint: Not wanting to be there   Patient/Family Comments/goals: "I'm ready to get out of here."   Pain/Comfort:  Pain Rating 1:  (not rated)  Location - Side 1: Right  Location - Orientation 1: generalized  Location 1: leg  Pain Addressed 1: Reposition, Distraction  Pain Rating Post-Intervention 1:  (not rated)      Objective:     Communicated with RN prior to session.  Patient found  in room sitting in wheel chair  with   upon PT entry to room.     General Precautions: Standard, fall  Orthopedic Precautions: RLE weight bearing as " tolerated  Braces: N/A  Respiratory Status: Room air     Functional Mobility:  Transfers:     Sit to Stand:  stand by assistance with rolling walker  Gait: 100 ft CGA with RW   Pt regularly commenting on not wanting assistance and standing to close during ambulation. Pt agitated throughout session, yet educated on safety and reasoning for assistance  Pt ambulated with an antalgic gait, decreased WB through R LE,  heel strike through R LE and increased WB through RW.         AM-PAC 6 CLICK MOBILITY  Turning over in bed (including adjusting bedclothes, sheets and blankets)?: 4  Sitting down on and standing up from a chair with arms (e.g., wheelchair, bedside commode, etc.): 3  Moving from lying on back to sitting on the side of the bed?: 3  Moving to and from a bed to a chair (including a wheelchair)?: 3  Need to walk in hospital room?: 3  Climbing 3-5 steps with a railing?: 2  Basic Mobility Total Score: 18       Treatment & Education:  Therapist provided instruction and educated for safety during transfers and gait training. As well as proper body mechanics, energy conservation, and fall prevention strategies during tasks listed above, and the effects of prolonged immobility and the importance of performing EOB/OOB activity and exercises to promote healing and reduce recovery time.       Patient left sitting edge of bed with all lines intact, call button in reach, RN notified, and mother present..    GOALS:   Multidisciplinary Problems       Physical Therapy Goals          Problem: Physical Therapy    Goal Priority Disciplines Outcome Goal Variances Interventions   Physical Therapy Goal     PT, PT/OT Ongoing, Progressing     Description: Goals to be met by: 24     Patient will increase functional independence with mobility by performin. Supine to sit with Supervision - Not met  2. Sit to stand transfer with Supervision with rolling walker - Not met  3. Gait x 150 feet with Supervision using  "Rolling Walker - Not met  4. Ascend/descend 6" curb step with RW and SBA - Not met                       Time Tracking:     PT Received On: 01/09/24  PT Start Time: 1339     PT Stop Time: 1351  PT Total Time (min): 12 min     Billable Minutes: Gait Training 12    Treatment Type: Treatment  PT/PTA: PTA     Number of PTA visits since last PT visit: 1     01/09/2024  "

## 2024-01-09 NOTE — ASSESSMENT & PLAN NOTE
2/6 bottles on admit with staph aureus from 1/5  Cx 1/7 NG so far  -echo without signs of endocarditis  -ID following, on vanc/zosyn now.
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
## 2024-01-09 NOTE — ASSESSMENT & PLAN NOTE
-chronic in nature w/o signs of active infection   -likely induced by Xylazine (Horse tranquilizer) vs levimasole mixed with heroin by suppliers   Wound care recs on 1/8 placed
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
## 2024-01-09 NOTE — SUBJECTIVE & OBJECTIVE
Interval History:   No acute events  Afebrile and WBC WNL  CO of hip pain.  Blood cultures with MRSA - repeats NGx 2 days  Hip cultures with Staph Aureus.  Denies spine pain  The patient denies any recent fever, chills, or sweats.       Review of Systems   Constitutional:  Negative for chills, diaphoresis and fever.   Respiratory:  Negative for shortness of breath.    Cardiovascular:  Negative for chest pain.   Gastrointestinal:  Negative for abdominal pain, diarrhea, nausea and vomiting.   Genitourinary:  Negative for dysuria and hematuria.   Musculoskeletal:  Positive for arthralgias.   Skin:  Positive for wound.     Objective:     Vital Signs (Most Recent):  Temp: 98.5 °F (36.9 °C) (01/08/24 1631)  Pulse: 107 (01/08/24 1631)  Resp: 18 (01/08/24 1638)  BP: 123/80 (01/08/24 1631)  SpO2: 96 % (01/08/24 1631) Vital Signs (24h Range):  Temp:  [97.7 °F (36.5 °C)-98.5 °F (36.9 °C)] 98.5 °F (36.9 °C)  Pulse:  [] 107  Resp:  [18-20] 18  SpO2:  [96 %-100 %] 96 %  BP: (112-123)/(62-83) 123/80     Weight: 70.8 kg (156 lb)  Body mass index is 25.96 kg/m².    Estimated Creatinine Clearance: 79.4 mL/min (based on SCr of 1 mg/dL).     Physical Exam  Vitals and nursing note reviewed.   Constitutional:       General: He is not in acute distress.     Appearance: He is ill-appearing (chronically). He is not toxic-appearing or diaphoretic.       HENT:      Nose: Nose normal. No congestion.      Mouth/Throat:      Mouth: Mucous membranes are moist.      Pharynx: Oropharynx is clear.   Eyes:      General: No scleral icterus.     Conjunctiva/sclera: Conjunctivae normal.   Cardiovascular:      Rate and Rhythm: Normal rate.      Heart sounds: Normal heart sounds. No murmur heard.  Pulmonary:      Effort: Pulmonary effort is normal. No respiratory distress.      Breath sounds: Normal breath sounds.   Abdominal:      General: Abdomen is flat. Bowel sounds are normal. There is no distension.      Palpations: Abdomen is soft.       Tenderness: There is no abdominal tenderness.   Musculoskeletal:         General: Swelling and tenderness present.      Cervical back: Normal range of motion. No rigidity or tenderness.      Right lower leg: No edema.      Left lower leg: No edema.      Comments: R hip / thigh pain, appropriate post-op. No redness, warmth, or fluctuance. Surgical drain in place; draining SS/bloody fluid.    Skin:     General: Skin is warm and dry.      Coloration: Skin is not jaundiced.      Findings: No erythema, lesion or rash.   Neurological:      Mental Status: He is alert and oriented to person, place, and time. Mental status is at baseline.   Psychiatric:         Behavior: Behavior normal.         Thought Content: Thought content normal.          Significant Labs: Blood Culture:   Recent Labs   Lab 01/05/24 2017 01/05/24 2031 01/07/24 0415   LABBLOO Gram stain derrick bottle: Gram positive cocci in clusters resembling Staph  Results called to and read back by: Mahi Mckeon LPN 01/06/2024  14:52  METHICILLIN RESISTANT STAPHYLOCOCCUS AUREUS  ID consult required at [facility].  *  Gram stain aer bottle: Gram positive cocci in clusters resembling Staph  Positive results previously called 01/06/2024  METHICILLIN RESISTANT STAPHYLOCOCCUS AUREUS  ID consult required at [facility].  For susceptibility see order #[id number]  * No Growth to date  No Growth to date  No Growth to date No Growth to date  No Growth to date  No Growth to date  No Growth to date     CBC:   Recent Labs   Lab 01/07/24 0415 01/08/24 0311   WBC 7.94 8.65   HGB 8.8* 8.4*   HCT 28.5* 28.4*   * 560*     CMP:   Recent Labs   Lab 01/07/24 0415 01/08/24 0311   * 141   K 3.6 3.5    103   CO2 25 29   * 95   BUN 16 15   CREATININE 1.1 1.0   CALCIUM 9.7 9.7   PROT 9.1* 8.9*   ALBUMIN 2.4* 2.4*   BILITOT 0.2 0.2   ALKPHOS 97 85   AST 22 22   ALT 15 14   ANIONGAP 9 9  "    Wound Culture: No results for input(s): "LABAERO" in the last 4320 hours.    Significant Imaging: I have reviewed all pertinent imaging results/findings within the past 24 hours.    Procedure Name Study Review Link PACS Link Status Accession Number Location    01/04/24 02:41 PM MRI Pelvis Without Contrast Study Review  Images Final [id number] [facility]   01/04/24 07:31 AM X-Ray Hip 2 or 3 views Right (with Pelvis when performed) Study Review  Images Final [id number] [facility]   01/08/24 01:10 PM Echo Study Review  Images Final [id number] [facility]     2023    Date Procedure Name Study Review Link PACS Link Status Accession Number Location   12/23/23 09:27 AM X-Ray Hip 2 or 3 views Left (with Pelvis when performed) Study Review  Final       "
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
## 2024-01-09 NOTE — PLAN OF CARE
Problem: Adult Inpatient Plan of Care  Goal: Patient-Specific Goal (Individualized)  Outcome: Ongoing, Progressing  Goal: Readiness for Transition of Care  Outcome: Ongoing, Progressing     Problem: Skin Injury Risk Increased  Goal: Skin Health and Integrity  Outcome: Ongoing, Progressing     Problem: Infection  Goal: Absence of Infection Signs and Symptoms  Outcome: Ongoing, Progressing

## 2024-01-09 NOTE — PROGRESS NOTES
[facility]  Orthopedics  Progress Note    Patient Name: Barrington Amin  MRN: [id number]  Admission Date: 1/5/2024  Hospital Length of Stay: 4 days  Attending Provider: Elaine Brewster MD  Primary Care Provider: Unable, To Obtain  Follow-up For: Procedure(s) (LRB):  IRRIGATION AND DRAINAGE ABSCESS RIGHT THIGH WITH ARTHROTOMY OF RIGHT HIP, TRIOS, ANTIBOTIC BEADS (Right)    Post-Operative Day: 3 Days Post-Op  Subjective:     Principal Problem:Pyogenic arthritis of right hip    Principal Orthopedic Problem: Irrigation and debridement of right hip for septic arthritis     Interval History: NAEON. AF, VSS. Pain is reportedly well controlled, and patient reports he has been ambulating well without assistance.  ID recommending PICC line placement and continuing vanc.      Review of patient's allergies indicates:  No Known Allergies    Current Facility-Administered Medications   Medication    acetaminophen tablet 1,000 mg    aluminum-magnesium hydroxide-simethicone 200-200-20 mg/5 mL suspension 30 mL    cloNIDine tablet 0.1 mg    collagenase ointment    dextrose 10% bolus 125 mL 125 mL    dextrose 10% bolus 250 mL 250 mL    dicyclomine capsule 10 mg    folic acid tablet 1 mg    glucagon (human recombinant) injection 1 mg    glucose chewable tablet 16 g    glucose chewable tablet 24 g    hydrOXYzine HCL tablet 50 mg    loperamide capsule 2 mg    LORazepam injection 1 mg    methocarbamoL tablet 750 mg    metroNIDAZOLE tablet 500 mg    multivitamin tablet    naloxone 0.4 mg/mL injection 0.02 mg    ondansetron injection 4 mg    oxyCODONE immediate release tablet 5 mg    oxyCODONE immediate release tablet Tab 10 mg    pregabalin capsule 75 mg    prochlorperazine injection Soln 10 mg    senna-docusate 8.6-50 mg per tablet 2 tablet    sodium chloride 0.9% flush 10 mL    sodium chloride 0.9% flush 10 mL    thiamine tablet 100 mg    traZODone tablet 100 mg    vancomycin (VANCOCIN) 1,000 mg in dextrose 5 % (D5W) 250 mL IVPB  "(Vial-Mate)    vancomycin - pharmacy to dose     Objective:     Vital Signs (Most Recent):  Temp: 97.8 °F (36.6 °C) (01/09/24 0400)  Pulse: 65 (01/09/24 0400)  Resp: 16 (01/09/24 0400)  BP: 123/81 (01/09/24 0400)  SpO2: 100 % (01/09/24 0400) Vital Signs (24h Range):  Temp:  [97.7 °F (36.5 °C)-98.5 °F (36.9 °C)] 97.8 °F (36.6 °C)  Pulse:  [] 65  Resp:  [16-20] 16  SpO2:  [96 %-100 %] 100 %  BP: (114-127)/(74-83) 123/81     Weight: 70.8 kg (156 lb)  Height: 5' 5" (165.1 cm)  Body mass index is 25.96 kg/m².      Intake/Output Summary (Last 24 hours) at 1/9/2024 0551  Last data filed at 1/8/2024 0645  Gross per 24 hour   Intake --   Output 0 ml   Net 0 ml         Ortho/SPM Exam  NAD, resting comfortably in bed  A&O x3   Breathing comfortably w/o distress   Extremities WWP      Painless PROM of R hip in flexion, extension, internal, and external rotation   Drain with minimal SS output   Minimal pain with compression of the R hemipelvis and NonTTP of the GT   Compartments soft and compressible      Significant Labs: All pertinent labs within the past 24 hours have been reviewed.    Significant Imaging: I have reviewed and interpreted all pertinent imaging results/findings.  Assessment/Plan:     * Pyogenic arthritis of right hip  Barrington Amin is a 48 y.o. male with PMH significant for IVDU presenting with right hip pain and concern for septic arthritis secondary to osteomyelitis in the right acetabulum.  Based on the appearance of the patient's pelvic x-ray/MRI along with clinical correlation, patient would benefit from surgical intervention with a washout/total hip replacement.  However, given the fact that the patient is actively using IV drugs, can only off with a washout at this time. Had discussion with the patient regarding benefits of a washout surgical procedure, and he is amenable.     - WBAT   - IV Abx per primary   - Multimodal pain control   - Drain to be removed today   - OK for diet     » Dipso: f/u PT/OT " progress           SINDI Hall MD  Orthopedics  [facility] - Surgery

## 2024-01-10 PROBLEM — M00.051 STAPHYLOCOCCAL ARTHRITIS OF RIGHT HIP: Status: ACTIVE | Noted: 2024-01-05

## 2024-01-10 PROBLEM — B95.62 MRSA BACTEREMIA: Status: ACTIVE | Noted: 2024-01-07

## 2024-01-10 LAB
ALBUMIN SERPL BCP-MCNC: 2.6 G/DL (ref 3.5–5.2)
ALP SERPL-CCNC: 94 U/L (ref 55–135)
ALT SERPL W/O P-5'-P-CCNC: 18 U/L (ref 10–44)
ANION GAP SERPL CALC-SCNC: 10 MMOL/L (ref 8–16)
AST SERPL-CCNC: 24 U/L (ref 10–40)
BACTERIA BLD CULT: NORMAL
BACTERIA SPEC ANAEROBE CULT: NORMAL
BASOPHILS # BLD AUTO: 0.03 K/UL (ref 0–0.2)
BASOPHILS NFR BLD: 0.3 % (ref 0–1.9)
BILIRUB SERPL-MCNC: 0.3 MG/DL (ref 0.1–1)
BUN SERPL-MCNC: 19 MG/DL (ref 6–20)
CALCIUM SERPL-MCNC: 9.5 MG/DL (ref 8.7–10.5)
CHLORIDE SERPL-SCNC: 106 MMOL/L (ref 95–110)
CO2 SERPL-SCNC: 22 MMOL/L (ref 23–29)
CREAT SERPL-MCNC: 1.1 MG/DL (ref 0.5–1.4)
CRP SERPL-MCNC: 30.7 MG/L (ref 0–8.2)
DIFFERENTIAL METHOD BLD: ABNORMAL
EOSINOPHIL # BLD AUTO: 0 K/UL (ref 0–0.5)
EOSINOPHIL NFR BLD: 0.5 % (ref 0–8)
ERYTHROCYTE [DISTWIDTH] IN BLOOD BY AUTOMATED COUNT: 18.4 % (ref 11.5–14.5)
EST. GFR  (NO RACE VARIABLE): >60 ML/MIN/1.73 M^2
FINAL PATHOLOGIC DIAGNOSIS: NORMAL
GLUCOSE SERPL-MCNC: 77 MG/DL (ref 70–110)
GROSS: NORMAL
HCT VFR BLD AUTO: 30.7 % (ref 40–54)
HGB BLD-MCNC: 9.1 G/DL (ref 14–18)
IMM GRANULOCYTES # BLD AUTO: 0.03 K/UL (ref 0–0.04)
IMM GRANULOCYTES NFR BLD AUTO: 0.3 % (ref 0–0.5)
LYMPHOCYTES # BLD AUTO: 2.2 K/UL (ref 1–4.8)
LYMPHOCYTES NFR BLD: 25.6 % (ref 18–48)
Lab: NORMAL
MCH RBC QN AUTO: 20.3 PG (ref 27–31)
MCHC RBC AUTO-ENTMCNC: 29.6 G/DL (ref 32–36)
MCV RBC AUTO: 68 FL (ref 82–98)
MICROSCOPIC EXAM: NORMAL
MONOCYTES # BLD AUTO: 0.5 K/UL (ref 0.3–1)
MONOCYTES NFR BLD: 5.8 % (ref 4–15)
NEUTROPHILS # BLD AUTO: 5.8 K/UL (ref 1.8–7.7)
NEUTROPHILS NFR BLD: 67.5 % (ref 38–73)
NRBC BLD-RTO: 0 /100 WBC
PLATELET # BLD AUTO: 624 K/UL (ref 150–450)
PMV BLD AUTO: 8.1 FL (ref 9.2–12.9)
POTASSIUM SERPL-SCNC: 3.7 MMOL/L (ref 3.5–5.1)
PROT SERPL-MCNC: 9.1 G/DL (ref 6–8.4)
RBC # BLD AUTO: 4.49 M/UL (ref 4.6–6.2)
SODIUM SERPL-SCNC: 138 MMOL/L (ref 136–145)
VANCOMYCIN TROUGH SERPL-MCNC: 18.6 UG/ML (ref 10–22)
WBC # BLD AUTO: 8.61 K/UL (ref 3.9–12.7)

## 2024-01-10 PROCEDURE — 86140 C-REACTIVE PROTEIN: CPT | Performed by: HOSPITALIST

## 2024-01-10 PROCEDURE — 25000003 PHARM REV CODE 250: Performed by: HOSPITALIST

## 2024-01-10 PROCEDURE — 97535 SELF CARE MNGMENT TRAINING: CPT

## 2024-01-10 PROCEDURE — 25000003 PHARM REV CODE 250

## 2024-01-10 PROCEDURE — 27000207 HC ISOLATION

## 2024-01-10 PROCEDURE — 80202 ASSAY OF VANCOMYCIN: CPT | Performed by: HOSPITALIST

## 2024-01-10 PROCEDURE — 11000001 HC ACUTE MED/SURG PRIVATE ROOM

## 2024-01-10 PROCEDURE — 85025 COMPLETE CBC W/AUTO DIFF WBC: CPT | Performed by: HOSPITALIST

## 2024-01-10 PROCEDURE — 36415 COLL VENOUS BLD VENIPUNCTURE: CPT | Performed by: HOSPITALIST

## 2024-01-10 PROCEDURE — 99233 SBSQ HOSP IP/OBS HIGH 50: CPT | Mod: ,,, | Performed by: PHYSICIAN ASSISTANT

## 2024-01-10 PROCEDURE — 97116 GAIT TRAINING THERAPY: CPT

## 2024-01-10 PROCEDURE — 63600175 PHARM REV CODE 636 W HCPCS: Performed by: HOSPITALIST

## 2024-01-10 PROCEDURE — 80053 COMPREHEN METABOLIC PANEL: CPT | Performed by: HOSPITALIST

## 2024-01-10 PROCEDURE — 87040 BLOOD CULTURE FOR BACTERIA: CPT | Mod: 59 | Performed by: HOSPITALIST

## 2024-01-10 RX ADMIN — VANCOMYCIN HYDROCHLORIDE 1000 MG: 1 INJECTION, POWDER, LYOPHILIZED, FOR SOLUTION INTRAVENOUS at 04:01

## 2024-01-10 RX ADMIN — FOLIC ACID 1 MG: 1 TABLET ORAL at 08:01

## 2024-01-10 RX ADMIN — THERA TABS 1 TABLET: TAB at 08:01

## 2024-01-10 RX ADMIN — PREGABALIN 75 MG: 50 CAPSULE ORAL at 08:01

## 2024-01-10 RX ADMIN — METHOCARBAMOL 750 MG: 750 TABLET ORAL at 08:01

## 2024-01-10 RX ADMIN — ACETAMINOPHEN 1000 MG: 325 TABLET ORAL at 05:01

## 2024-01-10 RX ADMIN — ACETAMINOPHEN 1000 MG: 325 TABLET ORAL at 08:01

## 2024-01-10 RX ADMIN — Medication 100 MG: at 08:01

## 2024-01-10 RX ADMIN — TRAZODONE HYDROCHLORIDE 100 MG: 100 TABLET ORAL at 09:01

## 2024-01-10 RX ADMIN — METHOCARBAMOL 750 MG: 750 TABLET ORAL at 09:01

## 2024-01-10 RX ADMIN — COLLAGENASE SANTYL: 250 OINTMENT TOPICAL at 08:01

## 2024-01-10 RX ADMIN — PREGABALIN 75 MG: 50 CAPSULE ORAL at 09:01

## 2024-01-10 RX ADMIN — VANCOMYCIN HYDROCHLORIDE 1000 MG: 1 INJECTION, POWDER, LYOPHILIZED, FOR SOLUTION INTRAVENOUS at 05:01

## 2024-01-10 RX ADMIN — OXYCODONE HYDROCHLORIDE 10 MG: 10 TABLET ORAL at 09:01

## 2024-01-10 RX ADMIN — OXYCODONE HYDROCHLORIDE 10 MG: 10 TABLET ORAL at 03:01

## 2024-01-10 RX ADMIN — METHOCARBAMOL 750 MG: 750 TABLET ORAL at 05:01

## 2024-01-10 RX ADMIN — OXYCODONE HYDROCHLORIDE 10 MG: 10 TABLET ORAL at 01:01

## 2024-01-10 NOTE — ASSESSMENT & PLAN NOTE
Barrington Amin is a 48 y.o. male with PMH significant for IVDU presenting with right hip pain and concern for septic arthritis secondary to osteomyelitis in the right acetabulum.  Based on the appearance of the patient's pelvic x-ray/MRI along with clinical correlation, patient would benefit from surgical intervention with a washout/total hip replacement.  However, given the fact that the patient is actively using IV drugs, can only off with a washout at this time. Had discussion with the patient regarding benefits of a washout surgical procedure, and he is amenable.     - WBAT   - IV Abx per primary   - Multimodal pain control   - Drain to be removed today   - OK for diet     » Dipso: f/u blood cultures

## 2024-01-10 NOTE — ASSESSMENT & PLAN NOTE
-long term use of cocaine, IVDU (heroin, fentanyl), alcohol and tobacco   -last used heroin 4 days ago and cocaine last week   -no signs of withdrawal at current time  he reports, denies any signs now.  -monitor for withdrawal using CIWA and COWS   -prn meds ordered for withdrawal   -counseled cessation and will order nicotine patch  Had episode of vomitign 1/7 but he reprots does not feel like withdrawal related, wife reports active withdrawals since then.  1/10 improving at tail end, looking better. Still has prs if needs

## 2024-01-10 NOTE — PT/OT/SLP PROGRESS
Occupational Therapy   Treatment    Name: Barrington Amin  MRN: [id number]  Admitting Diagnosis:  Pyogenic arthritis of right hip  4 Days Post-Op    Recommendations:     Discharge Recommendations: Low Intensity Therapy  Discharge Equipment Recommendations:  walker, rolling  Barriers to discharge:  Other (Comment) (Increased A with ADLs & functional mobility/transfers)    Assessment:     Barrington Amin is a 48 y.o. male with a medical diagnosis of Pyogenic arthritis of right hip.  He presents with the following performance deficits affecting function are weakness, impaired endurance, pain, impaired self care skills, impaired functional mobility, impaired balance, decreased lower extremity function, orthopedic precautions. Pt agreeable to therapy and tolerated fairly. Pt participated in adaptive equipment (AE) training to promote independence with lower body dressing (LBD). Pt demonstrated/ verbalized interest in continual practice with AE & will be considering purchasing reacher/sock aid.   Pt remains limited in ADLs, functional mobility, and functional transfers and is currently not performing tasks at OF. Pt would continue to benefit from skilled OT services to maximize functional independence with ADLs and functional mobility, reduce caregiver burden, and facilitate safe discharge in the least restrictive environment.     Rehab Prognosis:  Good; patient would benefit from acute skilled OT services to address these deficits and reach maximum level of function.       Plan:     Patient to be seen 4 x/week to address the above listed problems via self-care/home management, therapeutic activities, therapeutic exercises  Plan of Care Expires: 01/21/24  Plan of Care Reviewed with: patient    Subjective     Chief Complaint: R hip pain  Patient/Family Comments/goals: pt agreeable to OT session  Pain/Comfort:  Pain Rating 1: other (see comments) (Pt did not rate)  Location - Side 1: Right  Location - Orientation 1:  generalized  Location 1: hip  Pain Addressed 1: Distraction, Reposition, Cessation of Activity  Pain Rating Post-Intervention 1: other (see comments) (pt did not rate)    Objective:     Communicated with: RN prior to session.  Patient found up in wheelchair with  (no active lines) upon OT entry to room.    General Precautions: Standard, fall    Orthopedic Precautions:RLE weight bearing as tolerated  Braces: N/A  Respiratory Status: Room air     Occupational Performance:     Activities of Daily Living:  Lower Body Dressing: total assistance to don sock on R foot  Stand by assistance with verbal cueing to dof R sock using reacher  Pt unable complete using sock aid 2/2 increased R hip pain   Adaptive equipment (AE) training: Pt participated in AE training to promote independence/participation with LBD. Pt received verbal & physical demonstration of reacher, sock aid, & long shoe horn & folowed up with hands-on practice.    [facility] 6 Click ADL: 19    Treatment & Education:  -Education on task modification/AE training to maximize safety and (I) during ADLs and mobility  -Education on importance of OOB activity to improve overall activity tolerance and promote recovery  -Pt educated to call for assistance and to transfer with hospital staff only  Pt had no further questions & when asked whether there were any concerns pt reported none.     Patient left up in chair with  family member present    GOALS:   Multidisciplinary Problems       Occupational Therapy Goals          Problem: Occupational Therapy    Goal Priority Disciplines Outcome Interventions   Occupational Therapy Goal     OT, PT/OT Ongoing, Progressing    Description: Goals to be met by: 2/7/24     Patient will increase functional independence with ADLs by performing:    UE Dressing with Supervision.  LE Dressing with Supervision.  Grooming while standing at sink with Supervision.  Toileting from toilet with Supervision for hygiene and clothing management.    Supine to sit with Supervision.  Step transfer with Supervision  Toilet transfer to toilet with Supervision.                         Time Tracking:     OT Date of Treatment: 01/10/24  OT Start Time: 1401  OT Stop Time: 1416  OT Total Time (min): 15 min    Billable Minutes:Self Care/Home Management 15    OT/ANAND: OT          1/10/2024

## 2024-01-10 NOTE — PROGRESS NOTES
[facility]  Orthopedics  Progress Note    Patient Name: Barrington Amin  MRN: [id number]  Admission Date: 1/5/2024  Hospital Length of Stay: 5 days  Attending Provider: Elaine Brewster MD  Primary Care Provider: Unable, To Obtain  Follow-up For: Procedure(s) (LRB):  IRRIGATION AND DRAINAGE ABSCESS RIGHT THIGH WITH ARTHROTOMY OF RIGHT HIP, TRIOS, ANTIBOTIC BEADS (Right)    Post-Operative Day: 4 Days Post-Op  Subjective:     Principal Problem:Pyogenic arthritis of right hip    Principal Orthopedic Problem: Irrigation and debridement of right hip for septic arthritis     Interval History: NAEON. AF, VSS. Pain is reportedly well controlled, and patient was able to ambulate 100ft with PT.  Drain removed yesterday and rpt blood cultures were drawn this morning, with ID recommending SAMY if positive.      Review of patient's allergies indicates:  No Known Allergies    Current Facility-Administered Medications   Medication    acetaminophen tablet 1,000 mg    aluminum-magnesium hydroxide-simethicone 200-200-20 mg/5 mL suspension 30 mL    cloNIDine tablet 0.1 mg    collagenase ointment    dextrose 10% bolus 125 mL 125 mL    dextrose 10% bolus 250 mL 250 mL    dicyclomine capsule 10 mg    folic acid tablet 1 mg    glucagon (human recombinant) injection 1 mg    glucose chewable tablet 16 g    glucose chewable tablet 24 g    hydrOXYzine HCL tablet 50 mg    loperamide capsule 2 mg    LORazepam injection 1 mg    methocarbamoL tablet 750 mg    multivitamin tablet    naloxone 0.4 mg/mL injection 0.02 mg    ondansetron injection 4 mg    oxyCODONE immediate release tablet 5 mg    oxyCODONE immediate release tablet Tab 10 mg    pregabalin capsule 75 mg    prochlorperazine injection Soln 10 mg    senna-docusate 8.6-50 mg per tablet 2 tablet    sodium chloride 0.9% flush 10 mL    sodium chloride 0.9% flush 10 mL    thiamine tablet 100 mg    traZODone tablet 100 mg    vancomycin (VANCOCIN) 1,000 mg in dextrose 5 % (D5W) 250 mL IVPB  "(Vial-Mate)    vancomycin - pharmacy to dose     Objective:     Vital Signs (Most Recent):  Temp: 98.5 °F (36.9 °C) (01/10/24 0515)  Pulse: 69 (01/10/24 0515)  Resp: 18 (01/10/24 0515)  BP: (!) 144/85 (01/10/24 0515)  SpO2: 99 % (01/10/24 0515) Vital Signs (24h Range):  Temp:  [98 °F (36.7 °C)-98.5 °F (36.9 °C)] 98.5 °F (36.9 °C)  Pulse:  [67-86] 69  Resp:  [16-18] 18  SpO2:  [99 %-100 %] 99 %  BP: (117-145)/(65-85) 144/85     Weight: 70.8 kg (156 lb)  Height: 5' 5" (165.1 cm)  Body mass index is 25.96 kg/m².    No intake or output data in the 24 hours ending 01/10/24 0702      Ortho/SPM Exam  NAD, resting comfortably in bed  A&O x3   Breathing comfortably w/o distress   Extremities WWP      Painless PROM of R hip in flexion, extension, internal, and external rotation   Minimal pain with compression of the R hemipelvis and NonTTP of the GT   Compartments soft and compressible      Significant Labs: All pertinent labs within the past 24 hours have been reviewed.    Significant Imaging: I have reviewed and interpreted all pertinent imaging results/findings.  Assessment/Plan:     * Pyogenic arthritis of right hip  Barrington Amin is a 48 y.o. male with PMH significant for IVDU presenting with right hip pain and concern for septic arthritis secondary to osteomyelitis in the right acetabulum.  Based on the appearance of the patient's pelvic x-ray/MRI along with clinical correlation, patient would benefit from surgical intervention with a washout/total hip replacement.  However, given the fact that the patient is actively using IV drugs, can only off with a washout at this time. Had discussion with the patient regarding benefits of a washout surgical procedure, and he is amenable.     - WBAT   - IV Abx per primary   - Multimodal pain control   - Drain to be removed today   - OK for diet     » Dipso: f/u blood cultures          SINDI Hall MD  Orthopedics  [facility] - Surgery    "

## 2024-01-10 NOTE — SUBJECTIVE & OBJECTIVE
Principal Problem:Pyogenic arthritis of right hip    Principal Orthopedic Problem: Irrigation and debridement of right hip for septic arthritis     Interval History: NAEON. AF, VSS. Pain is reportedly well controlled, and patient was able to ambulate 100ft with PT.  Drain removed yesterday and rpt blood cultures were drawn this morning, with ID recommending SAMY if positive.      Review of patient's allergies indicates:  No Known Allergies    Current Facility-Administered Medications   Medication    acetaminophen tablet 1,000 mg    aluminum-magnesium hydroxide-simethicone 200-200-20 mg/5 mL suspension 30 mL    cloNIDine tablet 0.1 mg    collagenase ointment    dextrose 10% bolus 125 mL 125 mL    dextrose 10% bolus 250 mL 250 mL    dicyclomine capsule 10 mg    folic acid tablet 1 mg    glucagon (human recombinant) injection 1 mg    glucose chewable tablet 16 g    glucose chewable tablet 24 g    hydrOXYzine HCL tablet 50 mg    loperamide capsule 2 mg    LORazepam injection 1 mg    methocarbamoL tablet 750 mg    multivitamin tablet    naloxone 0.4 mg/mL injection 0.02 mg    ondansetron injection 4 mg    oxyCODONE immediate release tablet 5 mg    oxyCODONE immediate release tablet Tab 10 mg    pregabalin capsule 75 mg    prochlorperazine injection Soln 10 mg    senna-docusate 8.6-50 mg per tablet 2 tablet    sodium chloride 0.9% flush 10 mL    sodium chloride 0.9% flush 10 mL    thiamine tablet 100 mg    traZODone tablet 100 mg    vancomycin (VANCOCIN) 1,000 mg in dextrose 5 % (D5W) 250 mL IVPB (Vial-Mate)    vancomycin - pharmacy to dose     Objective:     Vital Signs (Most Recent):  Temp: 98.5 °F (36.9 °C) (01/10/24 0515)  Pulse: 69 (01/10/24 0515)  Resp: 18 (01/10/24 0515)  BP: (!) 144/85 (01/10/24 0515)  SpO2: 99 % (01/10/24 0515) Vital Signs (24h Range):  Temp:  [98 °F (36.7 °C)-98.5 °F (36.9 °C)] 98.5 °F (36.9 °C)  Pulse:  [67-86] 69  Resp:  [16-18] 18  SpO2:  [99 %-100 %] 99 %  BP: (117-145)/(65-85) 144/85  "    Weight: 70.8 kg (156 lb)  Height: 5' 5" (165.1 cm)  Body mass index is 25.96 kg/m².    No intake or output data in the 24 hours ending 01/10/24 0702       Ortho/SPM Exam  NAD, resting comfortably in bed  A&O x3   Breathing comfortably w/o distress   Extremities WWP      Painless PROM of R hip in flexion, extension, internal, and external rotation   Minimal pain with compression of the R hemipelvis and NonTTP of the GT   Compartments soft and compressible      Significant Labs: All pertinent labs within the past 24 hours have been reviewed.    Significant Imaging: I have reviewed and interpreted all pertinent imaging results/findings.  "

## 2024-01-10 NOTE — PLAN OF CARE
[facility]  Discharge Reassessment    Primary Care Provider: Unable, To Obtain    Expected Discharge Date: 1/10/2024    Reassessment (most recent)       Discharge Reassessment - 01/10/24 0741          Discharge Reassessment    Assessment Type Discharge Planning Reassessment     Did the patient's condition or plan change since previous assessment? No     Discharge Plan A Long-term acute care facility (LTAC)     Discharge Plan B Home Health;Home with family     Transition of Care Barriers Substance Abuse                   Patient has multiple accepting LTAC, but he has stated he would not discharge to a facility for 6 weeks. His preference is to dc home.    DC pending blood culture results.

## 2024-01-10 NOTE — ASSESSMENT & PLAN NOTE
2/6 bottles on admit with staph aureus from 1/5  Cx 1/7 still + for MRSA  Repeat 1/10 pending  -if stay + will likely need SAMY per ID  -echo without signs of endocarditis  -ID following, on vanc now.

## 2024-01-10 NOTE — SUBJECTIVE & OBJECTIVE
Interval history- he  is in good spirits this morning, sitting in chair with wife at bedside. He said he feels better and that he is going to stay and that he is not going to give the team issues now that he is going to stay and not leave [facility]. He reports that he is going stir crazy in his room all the time. He said that going outside to smoke helped but its so cold outside he couldn't stay out there very long so gave him permission to leave thhe floor with wife for 10-15 min to go to the [employer] to the gift shop or watch piano playing or to go eat in the cafeteria to get pizza o sushi I told him have other options than the food delivered as he isnt a fan of the food upstairs. His repeat blood CX unfortunately were + late for staph in 1 bottle so he hasn't cleared them yet and repeat ordered today. If they are continuously + then will have to pursue SAMY per ID recs as would have to presume has another nidus (arm wounds) if ortho felt the hip was fully drained as drain was removed yesterday. When he was wanting to leave ama ID gave rec for zyvox 600 BID over nothing if he left ama (dont dc with trazodone) if he was leaving [facility] but luckily he has not left ama. I updated kasia with ID that he is planning to stay now and luci with CM. He has multiple ltac acceptances he said. Will f//u further cx data and itold him and his wife that news. She said the nusre last night told them the ativan for prn withdrawal symptoms wasn't ordered but it is still there q4 if he needs I checked and the nurse must not have looked hard enough for it last night as it has not been discontinud so he can still have that if he needs, he seems like he's on the tail end of this as he is looking much better in terms of mood and interactions. Discussed with nurse jesus alberto who had him yesterday and she agrees he is in better mood all around today        No current facility-administered medications on file prior to encounter.     Current Outpatient  Medications on File Prior to Encounter   Medication Sig    traZODone (DESYREL) 100 MG tablet Take 100 mg by mouth every evening.    mirtazapine (REMERON) 30 MG tablet Take 30 mg by mouth.    phenytoin (DILANTIN) 100 MG ER capsule Take 100 mg by mouth.     Family History    None       Tobacco Use    Smoking status: Never    Smokeless tobacco: Never   Substance and Sexual Activity    Alcohol use: Never    Drug use: Never    Sexual activity: Not on file     Review of Systems   Constitutional:  Positive for fatigue. Negative for activity change, appetite change, chills, diaphoresis, fever and unexpected weight change.   HENT:  Negative for congestion, dental problem, drooling, ear discharge, ear pain, facial swelling, hearing loss, mouth sores, nosebleeds, postnasal drip, rhinorrhea, sinus pressure, sneezing, sore throat, tinnitus, trouble swallowing and voice change.    Eyes:  Negative for photophobia, pain, discharge, redness, itching and visual disturbance.   Respiratory:  Negative for apnea, cough, choking, chest tightness, shortness of breath, wheezing and stridor.    Cardiovascular:  Negative for chest pain, palpitations and leg swelling.   Gastrointestinal:  Negative for abdominal distention, abdominal pain, anal bleeding, blood in stool, constipation, diarrhea, nausea, rectal pain and vomiting.   Endocrine: Negative for cold intolerance, heat intolerance, polydipsia, polyphagia and polyuria.   Genitourinary:  Negative for decreased urine volume, difficulty urinating, dysuria, enuresis, flank pain, frequency, genital sores, hematuria, penile discharge, penile pain, penile swelling, scrotal swelling, testicular pain and urgency.   Musculoskeletal:  Positive for arthralgias (right hip pain) and joint swelling (R hip swelling). Negative for back pain, gait problem, myalgias, neck pain and neck stiffness.   Skin:  Positive for wound (chronic wound over bilateral upper and lower extremities w/o drainage). Negative for  color change, pallor and rash.   Allergic/Immunologic: Negative for environmental allergies, food allergies and immunocompromised state.   Neurological:  Negative for dizziness, tremors, seizures, syncope, facial asymmetry, speech difficulty, weakness, light-headedness, numbness and headaches.   Hematological:  Negative for adenopathy. Does not bruise/bleed easily.   Psychiatric/Behavioral:  Negative for agitation, behavioral problems, confusion, decreased concentration, dysphoric mood, hallucinations, self-injury, sleep disturbance and suicidal ideas. The patient is nervous/anxious. The patient is not hyperactive.      Objective:     Vital Signs (Most Recent):  Temp: 98 °F (36.7 °C) (01/10/24 1130)  Pulse: (!) 116 (01/10/24 1130)  Resp: 16 (01/10/24 1130)  BP: 108/66 (01/10/24 1130)  SpO2: 98 % (01/10/24 1130) Vital Signs (24h Range):  Temp:  [98 °F (36.7 °C)-98.5 °F (36.9 °C)] 98 °F (36.7 °C)  Pulse:  [] 116  Resp:  [16-18] 16  SpO2:  [98 %-100 %] 98 %  BP: (108-145)/(65-85) 108/66     Weight: 70.8 kg (156 lb)  Body mass index is 25.96 kg/m².     Physical Exam  Constitutional:       General: He is not in acute distress.     Appearance: He is well-developed. He is ill-appearing. He is not diaphoretic.      Comments: In good mood. In chair. Family at bedside.   HENT:      Head: Normocephalic and atraumatic.      Nose: Nose normal.      Mouth/Throat:      Pharynx: No oropharyngeal exudate.   Eyes:      General: No scleral icterus.     Conjunctiva/sclera: Conjunctivae normal.      Pupils: Pupils are equal, round, and reactive to light.   Neck:      Thyroid: No thyromegaly.      Vascular: No JVD.      Trachea: No tracheal deviation.   Cardiovascular:      Rate and Rhythm: Normal rate and regular rhythm.      Heart sounds: Normal heart sounds. No murmur heard.  Pulmonary:      Effort: Pulmonary effort is normal. No respiratory distress.      Breath sounds: Normal breath sounds. No wheezing or rales.   Chest:       Chest wall: No tenderness.   Abdominal:      General: Bowel sounds are normal. There is no distension.      Palpations: Abdomen is soft. There is no mass.      Tenderness: There is no abdominal tenderness. There is no guarding or rebound.   Musculoskeletal:         General: Swelling (mild swelling over R hip) and tenderness (TTP over R hip with limited ROM due to pain) present.      Cervical back: Normal range of motion and neck supple.      Comments: [place] to Right hip and right and left arms   Lymphadenopathy:      Cervical: No cervical adenopathy.   Skin:     General: Skin is warm and dry.      Findings: Lesion (large are of chronic wounds over bilateral upper and lower extremities w/o eschar or drainage (see pictures under media section)) present. No erythema or rash.      Comments: Bandages to bilateral upper arms.   Neurological:      Mental Status: He is alert and oriented to person, place, and time.      Cranial Nerves: No cranial nerve deficit.      Motor: No abnormal muscle tone.      Coordination: Coordination normal.      Deep Tendon Reflexes: Reflexes are normal and symmetric. Reflexes normal.   Psychiatric:         Thought Content: Thought content normal.         Judgment: Judgment normal.              CRANIAL NERVES     CN III, IV, VI   Pupils are equal, round, and reactive to light.       Significant Labs: All pertinent labs within the past 24 hours have been reviewed.  Recent Lab Results         01/10/24  0252        Albumin 2.6       ALP 94       ALT 18       Anion Gap 10       AST 24       Baso # 0.03       Basophil % 0.3       BILIRUBIN TOTAL 0.3  Comment: For infants and newborns, interpretation of results should be based  on gestational age, weight and in agreement with clinical  observations.    Premature Infant recommended reference ranges:  Up to 24 hours.............<8.0 mg/dL  Up to 48 hours............<12.0 mg/dL  3-5 days..................<15.0 mg/dL  6-29 days.................<15.0  mg/dL         BUN 19       Calcium 9.5       Chloride 106       CO2 22       Creatinine 1.1       CRP 30.7       Differential Method Automated       eGFR >60.0       Eos # 0.0       Eosinophil % 0.5       Glucose 77       Gran # (ANC) 5.8       Gran % 67.5       Hematocrit 30.7       Hemoglobin 9.1       Immature Grans (Abs) 0.03  Comment: Mild elevation in immature granulocytes is non specific and   can be seen in a variety of conditions including stress response,   acute inflammation, trauma and pregnancy. Correlation with other   laboratory and clinical findings is essential.         Immature Granulocytes 0.3       Lymph # 2.2       Lymph % 25.6       MCH 20.3       MCHC 29.6       MCV 68       Mono # 0.5       Mono % 5.8       MPV 8.1       nRBC 0       Platelet Count 624       Potassium 3.7       PROTEIN TOTAL 9.1       RBC 4.49       RDW 18.4       Sodium 138       WBC 8.61               Significant Imaging: I have reviewed all pertinent imaging results/findings within the past 24 hours.

## 2024-01-10 NOTE — PLAN OF CARE
Problem: Skin Injury Risk Increased  Goal: Skin Health and Integrity  Outcome: Ongoing, Progressing     Problem: Skin Injury Risk Increased  Goal: Skin Health and Integrity  Outcome: Ongoing, Progressing     Problem: Infection  Goal: Absence of Infection Signs and Symptoms  Outcome: Ongoing, Progressing     Problem: Infection  Goal: Absence of Infection Signs and Symptoms  Outcome: Ongoing, Progressing
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
## 2024-01-10 NOTE — ASSESSMENT & PLAN NOTE
-signs of withdrawal on 1/7 and 1/8 but patient refusing meds to help with withdrawal but improving 1/10  -okay to go outside to smoke cigarettes to help with withdrawal, charge nursing aware and okay with this

## 2024-01-10 NOTE — ASSESSMENT & PLAN NOTE
-chronic in nature w/o signs of active infection   -likely induced by Xylazine (Horse tranquilizer) vs levimasole mixed with heroin by suppliers   Wound care recs on 1/8 placed. If blood cx stay + then presume this may be another source

## 2024-01-10 NOTE — PT/OT/SLP PROGRESS
Physical Therapy Treatment    Patient Name:  Barrington Amin   MRN:  [id number]    Recommendations:     Discharge Recommendations: Low Intensity Therapy  Discharge Equipment Recommendations: walker, rolling  Barriers to discharge: None    Assessment:     Barrington Amin is a 48 y.o. male admitted with a medical diagnosis of Pyogenic arthritis of right hip.  He presents with the following impairments/functional limitations: weakness, impaired endurance, impaired self care skills, impaired functional mobility, gait instability, impaired balance, pain, decreased lower extremity function. Pt tolerated activity with improved use of R LE during gait training. PT provided education regarding typical gait pattern and mechanics, recommended exercises. Pt would continue to benefit from acute skilled therapy intervention to address deficits and progress toward prior level of function.       Rehab Prognosis: Good; patient would benefit from acute skilled PT services to address these deficits and reach maximum level of function.    Recent Surgery: Procedure(s) (LRB):  IRRIGATION AND DRAINAGE ABSCESS RIGHT THIGH WITH ARTHROTOMY OF RIGHT HIP, TRIOS, ANTIBOTIC BEADS (Right) 4 Days Post-Op    Plan:     During this hospitalization, patient to be seen 3 x/week to address the identified rehab impairments via gait training, neuromuscular re-education, therapeutic activities, therapeutic exercises and progress toward the following goals:    Plan of Care Expires:  02/06/24    Subjective     Chief Complaint: pt feels his walking is improving   Patient/Family Comments/goals: to get better   Pain/Comfort:  Pain Rating 1: 3/10  Location - Side 1: Right  Location - Orientation 1: generalized  Location 1: hip  Pain Addressed 1: Pre-medicate for activity, Reposition, Distraction, Cessation of Activity, Nurse notified  Pain Rating Post-Intervention 1: 3/10      Objective:     Communicated with RN prior to session.  Patient found up in chair with  (no active  lines) upon PT entry to room.     General Precautions: Standard, fall  Orthopedic Precautions: RLE weight bearing as tolerated  Braces: N/A  Respiratory Status: Room air     Functional Mobility:  Transfers:     Sit to Stand:  supervision with rolling walker  Gait: Pt ambulated 80 ft with RW and supervision. Pt continues to demo' antalgic gait, decreased stance time on R LE, decreased knee/hip flexion, flat foot initial contact, small step size, decreased foot clearance, excessive anterior lean. Pt with no LOB, no SOB, no dizziness.       AM-PAC 6 CLICK MOBILITY  Turning over in bed (including adjusting bedclothes, sheets and blankets)?: 4  Sitting down on and standing up from a chair with arms (e.g., wheelchair, bedside commode, etc.): 4  Moving from lying on back to sitting on the side of the bed?: 4  Moving to and from a bed to a chair (including a wheelchair)?: 4  Need to walk in hospital room?: 4  Climbing 3-5 steps with a railing?: 4  Basic Mobility Total Score: 24       Treatment & Education:  PT provided education regarding typical gait pattern and mechanics.   PT provided recommendation for exercises including practicing ankle DF, heel strike with initial step, equal weight shifts onto R and L LE, and R LE mini squat with L LE kick stand for balance support. Pt demo'd understanding, performed repetitions of R LE mini squat to ensure proper performance.   Pt educated on role of PT/POC. Pt verbalized understanding.       Patient left  sitting up in wheelchair  with  OT present..    GOALS:   Multidisciplinary Problems       Physical Therapy Goals          Problem: Physical Therapy    Goal Priority Disciplines Outcome Goal Variances Interventions   Physical Therapy Goal     PT, PT/OT Ongoing, Progressing     Description: Goals to be met by: 24     Patient will increase functional independence with mobility by performin. Supine to sit with Supervision - Not met  2. Sit to stand transfer with  "Supervision with rolling walker - Not met  3. Gait x 150 feet with Supervision using Rolling Walker - Not met  4. Ascend/descend 6" curb step with RW and SBA - Not met                       Time Tracking:     PT Received On: 01/10/24  PT Start Time: 1346     PT Stop Time: 1401  PT Total Time (min): 15 min     Billable Minutes: Gait Training 15 mins     Treatment Type: Treatment  PT/PTA: PT     Number of PTA visits since last PT visit: 0     01/10/2024  "

## 2024-01-10 NOTE — PROGRESS NOTES
Pharmacokinetic Assessment Follow Up: IV Vancomycin    Vancomycin serum concentration assessment(s):    The trough level was drawn correctly and can be used to guide therapy at this time. The measurement is within the desired definitive target range of 15 to 20 mcg/mL.    Vancomycin Regimen Plan:    Continue regimen to Vancomycin 1000 mg IV every 12 hours with next serum trough concentration measured at 0400 prior to 4th dose on 1/12/24    Drug levels (last 3 results):  Recent Labs   Lab Result Units 01/08/24  0311 01/08/24  1509 01/10/24  1524   Vancomycin-Trough ug/mL 51.2* 19.5 18.6       Pharmacy will continue to follow and monitor vancomycin.    Please contact pharmacy at extension [phone] for questions regarding this assessment.    Thank you for the consult,   Mathew Hogan       Patient brief summary:  Barrington Amin is a 48 y.o. male initiated on antimicrobial therapy with IV Vancomycin for treatment of bone/joint infection        Drug Allergies:   Review of patient's allergies indicates:  No Known Allergies    Actual Body Weight:   70.8 kg    Renal Function:   Estimated Creatinine Clearance: 72.2 mL/min (based on SCr of 1.1 mg/dL).,     Dialysis Method (if applicable):  N/A    CBC (last 72 hours):  Recent Labs   Lab Result Units 01/08/24 0311 01/09/24  0501 01/10/24  0252   WBC K/uL 8.65 6.92 8.61   Hemoglobin g/dL 8.4* 9.4* 9.1*   Hematocrit % 28.4* 32.3* 30.7*   Platelets K/uL 560* 605* 624*   Gran % % 76.1* 68.7 67.5   Lymph % % 16.8* 22.4 25.6   Mono % % 6.6 8.1 5.8   Eosinophil % % 0.1 0.1 0.5   Basophil % % 0.2 0.4 0.3   Differential Method  Automated Automated Automated       Metabolic Panel (last 72 hours):  Recent Labs   Lab Result Units 01/08/24 0311 01/09/24  0501 01/10/24  0252   Sodium mmol/L 141 139 138   Potassium mmol/L 3.5 3.7 3.7   Chloride mmol/L 103 104 106   CO2 mmol/L 29 21* 22*   Glucose mg/dL 95 78 77   BUN mg/dL 15 19 19   Creatinine mg/dL 1.0 1.1 1.1   Albumin g/dL 2.4* 2.5* 2.6*    Total Bilirubin mg/dL 0.2 0.3 0.3   Alkaline Phosphatase U/L 85 90 94   AST U/L 22 23 24   ALT U/L 14 15 18       Vancomycin Administrations:  vancomycin given in the last 96 hours                     vancomycin (VANCOCIN) 1,000 mg in dextrose 5 % (D5W) 250 mL IVPB (Vial-Mate) (mg) 1,000 mg New Bag 01/10/24 1711     1,000 mg New Bag  0417     1,000 mg New Bag 01/09/24 1530     1,000 mg New Bag  0446     1,000 mg New Bag 01/08/24 1635    vancomycin (VANCOCIN) 1,000 mg in dextrose 5 % (D5W) 250 mL IVPB (Vial-Mate) (mg) 1,000 mg New Bag 01/08/24 0213     1,000 mg New Bag 01/07/24 1331     1,000 mg New Bag  0233                    Microbiologic Results:  Microbiology Results (last 7 days)       Procedure Component Value Units Date/Time    Blood culture [[id number]] Collected: 01/10/24 0803    Order Status: Completed Specimen: Blood from Antecubital, Right Updated: 01/10/24 1515     Blood Culture, Routine No Growth to date    Blood culture [[id number]] Collected: 01/10/24 0803    Order Status: Completed Specimen: Blood Updated: 01/10/24 1515     Blood Culture, Routine No Growth to date    Culture, Anaerobe [[id number]] Collected: 01/06/24 0851    Order Status: Completed Specimen: Incision site from Hip, Right Updated: 01/10/24 1037     Anaerobic Culture No anaerobes isolated    Narrative:      3    Culture, Anaerobe [[id number]] Collected: 01/06/24 0851    Order Status: Completed Specimen: Incision site from Hip, Right Updated: 01/10/24 1037     Anaerobic Culture No anaerobes isolated    Narrative:      1    Culture, Anaerobe [[id number]] Collected: 01/06/24 0851    Order Status: Completed Specimen: Incision site from Hip, Right Updated: 01/10/24 1037     Anaerobic Culture No anaerobes isolated    Narrative:      2    Blood culture [[id number]]  (Abnormal) Collected: 01/07/24 0415    Order Status: Completed Specimen: Blood Updated: 01/10/24 0807     Blood Culture, Routine Gram stain aer bottle: Gram positive cocci in  clusters resembling Staph      Results called to and read back by: Glo Cassidy RN 01/09/2024  05:26      STAPHYLOCOCCUS AUREUS  Susceptibility pending  [facility]      Narrative:      Collection has been rescheduled by SF4 at 01/07/2024 03:59 Reason:   Unable to collect patient is a hard stick rn notified   Collection has been rescheduled by SF4 at 01/07/2024 03:59 Reason:   Unable to collect patient is a hard stick rn notified     Blood culture [[id number]] Collected: 01/07/24 0415    Order Status: Completed Specimen: Blood Updated: 01/10/24 0612     Blood Culture, Routine No Growth to date      No Growth to date      No Growth to date      No Growth to date    Narrative:      Collection has been rescheduled by SF4 at 01/07/2024 03:59 Reason:   Unable to collect patient is a hard stick rn notified   Collection has been rescheduled by SF4 at 01/07/2024 03:59 Reason:   Unable to collect patient is a hard stick rn notified     Blood culture #1 **CANNOT BE ORDERED STAT** [[id number]] Collected: 01/05/24 2031    Order Status: Completed Specimen: Blood from Peripheral, Antecubital, Left Updated: 01/09/24 2212     Blood Culture, Routine No Growth to date      No Growth to date      No Growth to date      No Growth to date      No Growth to date    Blood culture [[id number]]     Order Status: Canceled Specimen: Blood     Blood culture [[id number]]     Order Status: Canceled Specimen: Blood     Tissue culture [[id number]]  (Abnormal) Collected: 01/06/24 0851    Order Status: Completed Specimen: Tissue from Hip, Right Updated: 01/09/24 1027     Aerobic Culture - Tissue METHICILLIN RESISTANT STAPHYLOCOCCUS AUREUS  Few  For susceptibility see order # [id number]       Gram Stain Result Moderate WBC's      No organisms seen    Narrative:      3    Tissue culture [[id number]]  (Abnormal) Collected: 01/06/24 0851    Order Status: Completed Specimen: Tissue from Hip, Right  Updated: 01/09/24 1027     Aerobic Culture - Tissue METHICILLIN RESISTANT STAPHYLOCOCCUS AUREUS  Few  For susceptibility see order # [id number]       Gram Stain Result No WBC's      No organisms seen    Narrative:      1    Tissue culture [[id number]]  (Abnormal)  (Susceptibility) Collected: 01/06/24 0851    Order Status: Completed Specimen: Tissue from Hip, Right Updated: 01/09/24 1026     Aerobic Culture - Tissue METHICILLIN RESISTANT STAPHYLOCOCCUS AUREUS  Few       Gram Stain Result Many WBC's      Rare Gram positive cocci    Narrative:      2    MRSA/SA Rapid ID by PCR from Blood culture [[id number]]  (Abnormal) Collected: 01/07/24 0415    Order Status: Completed Updated: 01/09/24 0646     Staph aureus ID by PCR Positive     Methicillin Resistant ID by PCR Positive    Blood culture #2 **CANNOT BE ORDERED STAT** [[id number]]  (Abnormal)  (Susceptibility) Collected: 01/05/24 2017    Order Status: Completed Specimen: Blood from Peripheral, Forearm, Right Updated: 01/08/24 1113     Blood Culture, Routine Gram stain derrick bottle: Gram positive cocci in clusters resembling Staph      Results called to and read back by: Mahi Mckeon LPN 01/06/2024  14:52      METHICILLIN RESISTANT STAPHYLOCOCCUS AUREUS  ID consult required at [facility].      Blood culture [[id number]]  (Abnormal) Collected: 01/05/24 2017    Order Status: Completed Specimen: Blood from Peripheral, Forearm, Right Updated: 01/08/24 1111     Blood Culture, Routine Gram stain aer bottle: Gram positive cocci in clusters resembling Staph      Positive results previously called 01/06/2024      METHICILLIN RESISTANT STAPHYLOCOCCUS AUREUS  ID consult required at [facility].  For susceptibility see order #[id number]      Rapid Organism ID by PCR (from Blood culture) [[id number]]  (Abnormal) Collected: 01/05/24 2017    Order Status: Completed Updated: 01/06/24 1624     Enterococcus faecalis Not  Detected     Enterococcus faecium Not Detected     Listeria monocytogenes Not Detected     Staphylococcus spp. See species for ID     Staphylococcus aureus Detected     Staphylococcus epidermidis Not Detected     Staphylococcus lugdunensis Not Detected     Streptococcus species Not Detected     Streptococcus agalactiae Not Detected     Streptococcus pneumoniae Not Detected     Streptococcus pyogenes Not Detected     Acinetobacter calcoaceticus/baumannii complex Not Detected     Bacteroides fragilis Not Detected     Enterobacterales Not Detected     Enterobacter cloacae complex Not Detected     Escherichia coli Not Detected     Klebsiella aerogenes Not Detected     Klebsiella oxytoca Not Detected     Klebsiella pneumoniae group Not Detected     Proteus Not Detected     Salmonella sp Not Detected     Serratia marcescens Not Detected     Haemophilus influenzae Not Detected     Neisseria meningtidis Not Detected     Pseudomonas aeruginosa Not Detected     Stenotrophomonas maltophilia Not Detected     Candida albicans Not Detected     Candida auris Not Detected     Candida glabrata Not Detected     Candida krusei Not Detected     Candida parapsilosis Not Detected     Candida tropicalis Not Detected     Cryptococcus neoformans/gattii Not Detected     CTX-M (ESBL ) Test Not Applicable     IMP (Carbapenem resistant) Test Not Applicable     KPC resistance gene (Carbapenem resistant) Test Not Applicable     mcr-1  Test Not Applicable     mec A/C  Test Not Applicable     mec A/C and MREJ (MRSA) gene Detected     NDM (Carbapenem resistant) Test Not Applicable     OXA-48-like (Carbapenem resistant) Test Not Applicable     van A/B (VRE gene) Test Not Applicable     VIM (Carbapenem resistant) Test Not Applicable    Blood culture [[id number]]     Order Status: Canceled Specimen: Blood     Blood culture [[id number]]     Order Status: Canceled Specimen: Blood     Fungus culture [[id number]] Collected: 01/06/24 0851    Order  Status: Sent Specimen: Incision site from Hip, Right Updated: 01/06/24 0909    Fungus culture [[id number]] Collected: 01/06/24 0851    Order Status: Sent Specimen: Incision site from Hip, Right Updated: 01/06/24 0909    Fungus culture [[id number]] Collected: 01/06/24 0851    Order Status: Sent Specimen: Incision site from Hip, Right Updated: 01/06/24 0908    Aerobic culture [[id number]] Collected: 01/06/24 0851    Order Status: Canceled Specimen: Incision site from Hip, Right     Aerobic culture [[id number]] Collected: 01/06/24 0851    Order Status: Canceled Specimen: Incision site from Hip, Right     Aerobic culture [[id number]] Collected: 01/06/24 0851    Order Status: Canceled Specimen: Incision site from Hip, Right

## 2024-01-10 NOTE — SUBJECTIVE & OBJECTIVE
Interval History:   No acute events  Afebrile and WBC WNL  Blood cultures with MRSA - repeats 1/7 now with Staph Aureus  Hip cultures with MRSA  CRP improving  The patient denies any recent fever, chills, or sweats.       Review of Systems   Constitutional:  Negative for chills, diaphoresis and fever.   Respiratory:  Negative for shortness of breath.    Cardiovascular:  Negative for chest pain.   Gastrointestinal:  Negative for abdominal pain, diarrhea, nausea and vomiting.   Genitourinary:  Negative for dysuria and hematuria.   Musculoskeletal:  Positive for arthralgias.   Skin:  Positive for wound.     Objective:     Vital Signs (Most Recent):  Temp: 98.5 °F (36.9 °C) (01/10/24 0744)  Pulse: 68 (01/10/24 0744)  Resp: 18 (01/10/24 0744)  BP: (!) 142/72 (01/10/24 0744)  SpO2: 100 % (01/10/24 0744) Vital Signs (24h Range):  Temp:  [98 °F (36.7 °C)-98.5 °F (36.9 °C)] 98.5 °F (36.9 °C)  Pulse:  [67-69] 68  Resp:  [16-18] 18  SpO2:  [99 %-100 %] 100 %  BP: (117-145)/(65-85) 142/72     Weight: 70.8 kg (156 lb)  Body mass index is 25.96 kg/m².    Estimated Creatinine Clearance: 72.2 mL/min (based on SCr of 1.1 mg/dL).     Physical Exam  Vitals and nursing note reviewed.   Constitutional:       General: He is not in acute distress.     Appearance: He is ill-appearing (chronically). He is not toxic-appearing or diaphoretic.       HENT:      Nose: Nose normal. No congestion.      Mouth/Throat:      Mouth: Mucous membranes are moist.      Pharynx: Oropharynx is clear.   Eyes:      General: No scleral icterus.     Conjunctiva/sclera: Conjunctivae normal.   Cardiovascular:      Rate and Rhythm: Normal rate.      Heart sounds: Normal heart sounds. No murmur heard.  Pulmonary:      Effort: Pulmonary effort is normal. No respiratory distress.      Breath sounds: Normal breath sounds.   Abdominal:      General: Abdomen is flat. Bowel sounds are normal. There is no distension.      Palpations: Abdomen is soft.      Tenderness: There  is no abdominal tenderness.   Musculoskeletal:         General: Swelling and tenderness present.      Cervical back: Normal range of motion. No rigidity or tenderness.      Right lower leg: No edema.      Left lower leg: No edema.      Comments: R hip / thigh pain, appropriate post-op. No redness, warmth, or fluctuance. Surgical drain in place; draining SS/bloody fluid.    Skin:     General: Skin is warm and dry.      Coloration: Skin is not jaundiced.      Findings: No erythema, lesion or rash.   Neurological:      Mental Status: He is alert and oriented to person, place, and time. Mental status is at baseline.   Psychiatric:         Behavior: Behavior normal.         Thought Content: Thought content normal.                            Significant Labs: Blood Culture:   Recent Labs   Lab 01/05/24 2017 01/05/24 2031 01/07/24  0415   LABBLOO Gram stain derrick bottle: Gram positive cocci in clusters resembling Staph  Results called to and read back by: Mahi Mkceon LPN 01/06/2024  14:52  METHICILLIN RESISTANT STAPHYLOCOCCUS AUREUS  ID consult required at [facility].  *  Gram stain aer bottle: Gram positive cocci in clusters resembling Staph  Positive results previously called 01/06/2024  METHICILLIN RESISTANT STAPHYLOCOCCUS AUREUS  ID consult required at [facility].  For susceptibility see order #[id number]  * No Growth to date  No Growth to date  No Growth to date  No Growth to date  No Growth to date Gram stain aer bottle: Gram positive cocci in clusters resembling Staph  Results called to and read back by: Glo Cassidy RN 01/09/2024  05:26  STAPHYLOCOCCUS AUREUS  Susceptibility pending  ID consult required at [facility].  *  No Growth to date  No Growth to date  No Growth to date  No Growth to date       CBC:   Recent Labs   Lab 01/09/24  0501 01/10/24  0252   WBC 6.92 8.61   HGB 9.4* 9.1*   HCT 32.3*  "30.7*   * 624*       CMP:   Recent Labs   Lab 01/09/24  0501 01/10/24  0252    138   K 3.7 3.7    106   CO2 21* 22*   GLU 78 77   BUN 19 19   CREATININE 1.1 1.1   CALCIUM 9.5 9.5   PROT 9.0* 9.1*   ALBUMIN 2.5* 2.6*   BILITOT 0.3 0.3   ALKPHOS 90 94   AST 23 24   ALT 15 18   ANIONGAP 14 10       Wound Culture: No results for input(s): "LABAERO" in the last 4320 hours.    Significant Imaging: I have reviewed all pertinent imaging results/findings within the past 24 hours.Transthoracic echo (TTE) complete    Height: 5' 5" (1.651 m)   Weight: 70.8 kg (156 lb)   Blood Pressure: 114/74    Date of Study: 1/8/24   Ordering Provider: Elaine Brewster MD   Clinical Indications: Bacteremia [R78.81 (ICD-10-CM)]       Interpreting Physicians  Performing Staff   Johnnie Landeros MD Tech: Maik Espana        Reason for Exam  Priority: Routine  Dx: Bacteremia [R78.81 (ICD-10-CM)]     View Images Vital Vitrea     Show images for Echo  Summary         Left Ventricle: The left ventricle is normal in size. Normal wall thickness. Normal wall motion. There is normal systolic function with a visually estimated ejection fraction of 60 - 65%. There is normal diastolic function.    Right Ventricle: Normal right ventricular cavity size. Wall thickness is normal. Right ventricle wall motion  is normal. Systolic function is normal.    The left atrium is mildly dilated.    Pulmonary Artery: The estimated pulmonary artery systolic pressure is 32 mmHg.    IVC/SVC: Normal venous pressure at 3 mmHg.     Vitals    Height Weight BMI (Calculated) BSA (Calculated - sq m) BP Pulse   5' 5" (1.651 m) 70.8 kg (156 lb) 26 1.8 sq meters 114/74 65     Study Details A complete echo was performed using complete 2D, color flow Doppler and spectral Doppler. During the study, the apical, parasternal and subcostal views were captured.  Overall the study quality was adequate. This was a portable study performed at the patient's bedside. "     Echocardiography Findings    Left Ventricle The left ventricle is normal in size. Normal wall thickness. Normal wall motion. There is normal systolic function with a visually estimated ejection fraction of 60 - 65%. There is normal diastolic function.   Right Ventricle Normal right ventricular cavity size. Wall thickness is normal. Right ventricle wall motion  is normal. Systolic function is normal.   Left Atrium Left atrium is mildly dilated.   Right Atrium Normal right atrial size.   Aortic Valve The aortic valve is a trileaflet valve. There is normal leaflet mobility. Aortic valve peak velocity is 1.13 m/s. Mean gradient is 3 mmHg.   Mitral Valve The mitral valve is structurally normal. There is normal leaflet mobility.   Tricuspid Valve The tricuspid valve is structurally normal. There is normal leaflet mobility. There is trace regurgitation.   Pulmonic Valve The pulmonic valve is structurally normal. There is normal leaflet mobility. There is trace regurgitation.   IVC/SVC Normal venous pressure at 3 mmHg.   Ascending Aorta Aortic root is normal in size measuring 3.42 cm. Ascending aorta is normal measuring 2.96 cm.   Pericardium and Other Findings There is no pericardial effusion.   Pulmonary Artery The estimated pulmonary artery systolic pressure is 32 mmHg.     Exam Details    Performed Procedure Technologist     Transthoracic echo (TTE) complete Maik Espana             Begin Exam End Exam     1/8/2024 12:30 PM CST 1/8/2024  1:10 PM CST            Procedure Name Study Review Link PACS Link Status Accession Number Location    01/04/24 02:41 PM MRI Pelvis Without Contrast Study Review  Images Final [id number] [facility]   01/04/24 07:31 AM X-Ray Hip 2 or 3 views Right (with Pelvis when performed) Study Review  Images Final [id number] [facility]   01/08/24 01:10 PM Echo Study Review  Images Final [id number] [facility]     2023    Date Procedure Name Study Review Link PACS Link Status Accession Number Location   12/23/23 09:27  AM X-Ray Hip 2 or 3 views Left (with Pelvis when performed) Study Review  Final

## 2024-01-10 NOTE — ASSESSMENT & PLAN NOTE
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 Chronic damage seen and long term will neeed hip replacement if patient can stay sober.  -blood CX with staph aureus-MRSA  -WBAT, PT/OT with low intensity therapy. Plan for ltac and he has acceptances  Pain control with multimodals and controlled  -drain removed.  -ID va, on vanc now. Appreciate recs. Will need LTAC long term and picc line for antibiotics once bacteremia clears however initially refused, says he will stay now on 1/10.  -see above interval history for extensive counseling on1/8 and 1/9 regarding risk of this. Drain removed now. ID messaged to see if can devise an inferior regimen at this point given he cannot go home with IV antibiotics (if were to leave AMA rec zyvox 600 BID and dont dc with trazodone) over nothing, btu he states he is going to stay now on 1/10. Blood Cx still + on 1/7, repeat 1/10 pending. If stay + will need SAMY

## 2024-01-11 DIAGNOSIS — B19.20 HEPATITIS C TEST POSITIVE: Primary | ICD-10-CM

## 2024-01-11 LAB
ALBUMIN SERPL BCP-MCNC: 2.7 G/DL (ref 3.5–5.2)
ALP SERPL-CCNC: 98 U/L (ref 55–135)
ALT SERPL W/O P-5'-P-CCNC: 16 U/L (ref 10–44)
ANION GAP SERPL CALC-SCNC: 9 MMOL/L (ref 8–16)
AST SERPL-CCNC: 19 U/L (ref 10–40)
BACTERIA BLD CULT: ABNORMAL
BASOPHILS # BLD AUTO: 0.03 K/UL (ref 0–0.2)
BASOPHILS NFR BLD: 0.4 % (ref 0–1.9)
BILIRUB SERPL-MCNC: 0.3 MG/DL (ref 0.1–1)
BUN SERPL-MCNC: 18 MG/DL (ref 6–20)
CALCIUM SERPL-MCNC: 9.6 MG/DL (ref 8.7–10.5)
CHLORIDE SERPL-SCNC: 107 MMOL/L (ref 95–110)
CO2 SERPL-SCNC: 22 MMOL/L (ref 23–29)
CREAT SERPL-MCNC: 0.9 MG/DL (ref 0.5–1.4)
DIFFERENTIAL METHOD BLD: ABNORMAL
EOSINOPHIL # BLD AUTO: 0.1 K/UL (ref 0–0.5)
EOSINOPHIL NFR BLD: 0.9 % (ref 0–8)
ERYTHROCYTE [DISTWIDTH] IN BLOOD BY AUTOMATED COUNT: 18.3 % (ref 11.5–14.5)
EST. GFR  (NO RACE VARIABLE): >60 ML/MIN/1.73 M^2
GLUCOSE SERPL-MCNC: 102 MG/DL (ref 70–110)
HCT VFR BLD AUTO: 30.9 % (ref 40–54)
HGB BLD-MCNC: 9 G/DL (ref 14–18)
IMM GRANULOCYTES # BLD AUTO: 0.03 K/UL (ref 0–0.04)
IMM GRANULOCYTES NFR BLD AUTO: 0.4 % (ref 0–0.5)
LYMPHOCYTES # BLD AUTO: 2 K/UL (ref 1–4.8)
LYMPHOCYTES NFR BLD: 24.5 % (ref 18–48)
MCH RBC QN AUTO: 20.3 PG (ref 27–31)
MCHC RBC AUTO-ENTMCNC: 29.1 G/DL (ref 32–36)
MCV RBC AUTO: 70 FL (ref 82–98)
MONOCYTES # BLD AUTO: 0.6 K/UL (ref 0.3–1)
MONOCYTES NFR BLD: 7.9 % (ref 4–15)
NEUTROPHILS # BLD AUTO: 5.3 K/UL (ref 1.8–7.7)
NEUTROPHILS NFR BLD: 65.9 % (ref 38–73)
NRBC BLD-RTO: 0 /100 WBC
PLATELET # BLD AUTO: 613 K/UL (ref 150–450)
PMV BLD AUTO: 7.9 FL (ref 9.2–12.9)
POTASSIUM SERPL-SCNC: 3.5 MMOL/L (ref 3.5–5.1)
PROT SERPL-MCNC: 9 G/DL (ref 6–8.4)
RBC # BLD AUTO: 4.43 M/UL (ref 4.6–6.2)
SODIUM SERPL-SCNC: 138 MMOL/L (ref 136–145)
WBC # BLD AUTO: 8.07 K/UL (ref 3.9–12.7)

## 2024-01-11 PROCEDURE — 36410 VNPNXR 3YR/> PHY/QHP DX/THER: CPT

## 2024-01-11 PROCEDURE — 25000003 PHARM REV CODE 250: Performed by: HOSPITALIST

## 2024-01-11 PROCEDURE — 36415 COLL VENOUS BLD VENIPUNCTURE: CPT | Performed by: HOSPITALIST

## 2024-01-11 PROCEDURE — 85025 COMPLETE CBC W/AUTO DIFF WBC: CPT | Performed by: HOSPITALIST

## 2024-01-11 PROCEDURE — 80053 COMPREHEN METABOLIC PANEL: CPT | Performed by: HOSPITALIST

## 2024-01-11 PROCEDURE — 63600175 PHARM REV CODE 636 W HCPCS: Performed by: HOSPITALIST

## 2024-01-11 PROCEDURE — 27000207 HC ISOLATION

## 2024-01-11 PROCEDURE — 97116 GAIT TRAINING THERAPY: CPT

## 2024-01-11 PROCEDURE — 25000003 PHARM REV CODE 250

## 2024-01-11 PROCEDURE — 76937 US GUIDE VASCULAR ACCESS: CPT

## 2024-01-11 PROCEDURE — 11000001 HC ACUTE MED/SURG PRIVATE ROOM

## 2024-01-11 PROCEDURE — C1751 CATH, INF, PER/CENT/MIDLINE: HCPCS

## 2024-01-11 RX ADMIN — ACETAMINOPHEN 1000 MG: 325 TABLET ORAL at 12:01

## 2024-01-11 RX ADMIN — VANCOMYCIN HYDROCHLORIDE 1000 MG: 1 INJECTION, POWDER, LYOPHILIZED, FOR SOLUTION INTRAVENOUS at 07:01

## 2024-01-11 RX ADMIN — FOLIC ACID 1 MG: 1 TABLET ORAL at 08:01

## 2024-01-11 RX ADMIN — COLLAGENASE SANTYL: 250 OINTMENT TOPICAL at 08:01

## 2024-01-11 RX ADMIN — TRAZODONE HYDROCHLORIDE 100 MG: 100 TABLET ORAL at 11:01

## 2024-01-11 RX ADMIN — PREGABALIN 75 MG: 50 CAPSULE ORAL at 08:01

## 2024-01-11 RX ADMIN — METHOCARBAMOL 750 MG: 750 TABLET ORAL at 09:01

## 2024-01-11 RX ADMIN — Medication 100 MG: at 08:01

## 2024-01-11 RX ADMIN — ACETAMINOPHEN 1000 MG: 325 TABLET ORAL at 11:01

## 2024-01-11 RX ADMIN — VANCOMYCIN HYDROCHLORIDE 1000 MG: 1 INJECTION, POWDER, LYOPHILIZED, FOR SOLUTION INTRAVENOUS at 04:01

## 2024-01-11 RX ADMIN — METHOCARBAMOL 750 MG: 750 TABLET ORAL at 08:01

## 2024-01-11 RX ADMIN — OXYCODONE HYDROCHLORIDE 10 MG: 10 TABLET ORAL at 04:01

## 2024-01-11 RX ADMIN — OXYCODONE HYDROCHLORIDE 10 MG: 10 TABLET ORAL at 10:01

## 2024-01-11 RX ADMIN — ACETAMINOPHEN 1000 MG: 325 TABLET ORAL at 03:01

## 2024-01-11 RX ADMIN — THERA TABS 1 TABLET: TAB at 08:01

## 2024-01-11 RX ADMIN — METHOCARBAMOL 750 MG: 750 TABLET ORAL at 02:01

## 2024-01-11 RX ADMIN — PREGABALIN 75 MG: 50 CAPSULE ORAL at 09:01

## 2024-01-11 RX ADMIN — ACETAMINOPHEN 1000 MG: 325 TABLET ORAL at 08:01

## 2024-01-11 NOTE — PT/OT/SLP PROGRESS
Occupational Therapy      Patient Name:  Barrington Amin   MRN:  [id number]    Patient not seen today secondary to pt MARK .Upon 1st attempt, pt's family member mentioned that pt had just gone down to the 1st floor with his brother. Pt was later re-attempted but no one was present in room. RN notifed. Will follow-up 01/12/2024.      1st attempt: 1411  2nd attempt: 1547  1/11/2024

## 2024-01-11 NOTE — ASSESSMENT & PLAN NOTE
- Present on admit. Patient with long term use of cocaine, IVDU (Heroin, Fentanyl), alcohol and tobacco.  - Last used Heroin 4 days ago prior to admit and cocaine about 1 week prior to admit.   - Monitor for withdrawal using CIWA and COWS.   - prn meds ordered for withdrawal.  - Counseled on smoking cessation and Nicotine patch ordered to use daily in hospital.

## 2024-01-11 NOTE — ASSESSMENT & PLAN NOTE
Present on admit. Patient follows at [facility] for health maintenance and to start on treatment in future and viral load 1.17 million in the past.  LFT's normal. No signs of liver failure or decompensation.

## 2024-01-11 NOTE — ASSESSMENT & PLAN NOTE
- Chronic in nature without signs of active infection. Present on admit.   - Likely induced by Xylazine (Horse tranquilizer) vs Levimasole mixed with heroin by suppliers   Wound care recs on 1/8 placed and will continue wound care as per wound care recs.

## 2024-01-11 NOTE — ASSESSMENT & PLAN NOTE
Advance Care Planning     Date: 01/06/2024    [facility]  I engaged the patient in a voluntary conversation about advance care planning and we specifically addressed what the goals of care would be moving forward, in light of the patient's change in clinical status, specifically if his heart stops or unable to breath on his own.  We did specifically address the patient's likely prognosis, which is fair .  We explored the patient's values and preferences for future care.  The patient endorses that what is most important right now is to focus on remaining as independent as possible, symptom/pain control, and extending life as long as possible, even it it means sacrificing quality    Accordingly, we have decided that the best plan to meet the patient's goals includes continuing with treatment and wishes to remain full code.     I spent a total of 10 minutes engaging the patient in this advance care planning discussion.               Spironolactone Counseling: Patient advised regarding risks of diarrhea, abdominal pain, hyperkalemia, birth defects (for female patients), liver toxicity and renal toxicity. The patient may need blood work to monitor liver and kidney function and potassium levels while on therapy. The patient verbalized understanding of the proper use and possible adverse effects of spironolactone.  All of the patient's questions and concerns were addressed.
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
## 2024-01-11 NOTE — NURSING
Nurses Note -- 4 Eyes      1/10/2024   6:13 PM      Skin assessed during: Q Shift Change      [] No Altered Skin Integrity Present    []Prevention Measures Documented      [x] Yes- Altered Skin Integrity Present or Discovered   [] LDA Added if Not in Epic (Describe Wound)   [x] New Altered Skin Integrity was Present on Admit and Documented in LDA   [] Wound Image Taken    Wound Care Consulted? No    Attending Nurse:  Princess Bland RN    Second RN/Staff Member:   Princess Olivas RN

## 2024-01-11 NOTE — NURSING
Nurses Note -- 4 Eyes      1/11/2024   5:01 AM      Skin assessed during: Q Shift Change      [x] No Altered Skin Integrity Present    []Prevention Measures Documented      [] Yes- Altered Skin Integrity Present or Discovered   [] LDA Added if Not in Epic (Describe Wound)   [] New Altered Skin Integrity was Present on Admit and Documented in LDA   [] Wound Image Taken    Wound Care Consulted? No    Attending Nurse:  Francois Mckeon RN/Staff Member:   Princess
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
## 2024-01-11 NOTE — ASSESSMENT & PLAN NOTE
- Present on admit and related to septic arthritis and chronic open wounds to arms due to injection drug use.   Patient with positive blood cultures with MRSA on 1/5 and 1/6 and 1/7.   - Repeat blood cultures from 1/10 so far no growth but need to monitor.   - Transthoracic echo without signs of endocarditis on this admit.   - ID following and appreciate recs and continue IV Vancomycin to treat.

## 2024-01-11 NOTE — ASSESSMENT & PLAN NOTE
Present on admit. Body mass index is 25.96 kg/m². Albumin low at 2.7 on admit. Related to long term substance abuse.   Nutrition consulted.   Continue Boost supplements with all meal.    none

## 2024-01-11 NOTE — SUBJECTIVE & OBJECTIVE
Interval History:   No acute events  Afebrile and WBC WNL  Blood cultures with MRSA - repeats 1/7 now with MRSA - repeats on 1/10 [id number]  Hip cultures with MRSA  CRP improving  The patient denies any recent fever, chills, or sweats.       Review of Systems   Constitutional:  Negative for chills, diaphoresis and fever.   Respiratory:  Negative for shortness of breath.    Cardiovascular:  Negative for chest pain.   Gastrointestinal:  Negative for abdominal pain, diarrhea, nausea and vomiting.   Genitourinary:  Negative for dysuria and hematuria.   Musculoskeletal:  Positive for arthralgias.   Skin:  Positive for wound.     Objective:     Vital Signs (Most Recent):  Temp: 98 °F (36.7 °C) (01/11/24 1645)  Pulse: 79 (01/11/24 1645)  Resp: 18 (01/11/24 1645)  BP: 131/80 (01/11/24 1645)  SpO2: 97 % (01/11/24 1645) Vital Signs (24h Range):  Temp:  [97.5 °F (36.4 °C)-98.7 °F (37.1 °C)] 98 °F (36.7 °C)  Pulse:  [68-97] 79  Resp:  [17-18] 18  SpO2:  [97 %-99 %] 97 %  BP: (113-136)/(64-80) 131/80     Weight: 70.8 kg (156 lb)  Body mass index is 25.96 kg/m².    Estimated Creatinine Clearance: 88.3 mL/min (based on SCr of 0.9 mg/dL).     Physical Exam  Vitals and nursing note reviewed.   Constitutional:       General: He is not in acute distress.     Appearance: He is ill-appearing (chronically). He is not toxic-appearing or diaphoretic.       HENT:      Nose: Nose normal. No congestion.      Mouth/Throat:      Mouth: Mucous membranes are moist.      Pharynx: Oropharynx is clear.   Eyes:      General: No scleral icterus.     Conjunctiva/sclera: Conjunctivae normal.   Cardiovascular:      Rate and Rhythm: Normal rate.      Heart sounds: Normal heart sounds. No murmur heard.  Pulmonary:      Effort: Pulmonary effort is normal. No respiratory distress.      Breath sounds: Normal breath sounds.   Abdominal:      General: Abdomen is flat. Bowel sounds are normal. There is no distension.      Palpations: Abdomen is soft.       Tenderness: There is no abdominal tenderness.   Musculoskeletal:         General: Swelling and tenderness present.      Cervical back: Normal range of motion. No rigidity or tenderness.      Right lower leg: No edema.      Left lower leg: No edema.      Comments: R hip / thigh pain, appropriate post-op. No redness, warmth, or fluctuance. Surgical drain removed   Skin:     General: Skin is warm and dry.      Coloration: Skin is not jaundiced.      Findings: No erythema, lesion or rash.   Neurological:      Mental Status: He is alert and oriented to person, place, and time. Mental status is at baseline.   Psychiatric:         Behavior: Behavior normal.         Thought Content: Thought content normal.                            Significant Labs: Blood Culture:   Recent Labs   Lab 01/05/24 2017 01/05/24 2031 01/07/24  0415 01/10/24  0803   LABBLOO Gram stain derrick bottle: Gram positive cocci in clusters resembling Staph  Results called to and read back by: Mahi Mckeon LPN 01/06/2024  14:52  METHICILLIN RESISTANT STAPHYLOCOCCUS AUREUS  ID consult required at [facility].  *  Gram stain aer bottle: Gram positive cocci in clusters resembling Staph  Positive results previously called 01/06/2024  METHICILLIN RESISTANT STAPHYLOCOCCUS AUREUS  ID consult required at [facility].  For susceptibility see order #[id number]  * No growth after 5 days. Gram stain aer bottle: Gram positive cocci in clusters resembling Staph  Results called to and read back by: Glo Cassidy RN 01/09/2024  05:26  METHICILLIN RESISTANT STAPHYLOCOCCUS AUREUS  ID consult required at [facility].  *  No Growth to date  No Growth to date  No Growth to date  No Growth to date  No Growth to date No Growth to date  No Growth to date  No Growth to date  No Growth to date       CBC:   Recent Labs   Lab 01/10/24  0252 01/11/24  0546   WBC 8.61 8.07   HGB  "9.1* 9.0*   HCT 30.7* 30.9*   * 613*       CMP:   Recent Labs   Lab 01/10/24  0252 01/11/24  0546    138   K 3.7 3.5    107   CO2 22* 22*   GLU 77 102   BUN 19 18   CREATININE 1.1 0.9   CALCIUM 9.5 9.6   PROT 9.1* 9.0*   ALBUMIN 2.6* 2.7*   BILITOT 0.3 0.3   ALKPHOS 94 98   AST 24 19   ALT 18 16   ANIONGAP 10 9       Wound Culture: No results for input(s): "LABAERO" in the last 4320 hours.    Significant Imaging: I have reviewed all pertinent imaging results/findings within the past 24 hours.Transthoracic echo (TTE) complete    Height: 5' 5" (1.651 m)   Weight: 70.8 kg (156 lb)   Blood Pressure: 114/74    Date of Study: 1/8/24   Ordering Provider: Elaine Brewster MD   Clinical Indications: Bacteremia [R78.81 (ICD-10-CM)]       Interpreting Physicians  Performing Staff   Johnnie Landeros MD Tech: Maik Espana        Reason for Exam  Priority: Routine  Dx: Bacteremia [R78.81 (ICD-10-CM)]     View Images Vital Vitrea     Show images for Echo  Summary         Left Ventricle: The left ventricle is normal in size. Normal wall thickness. Normal wall motion. There is normal systolic function with a visually estimated ejection fraction of 60 - 65%. There is normal diastolic function.    Right Ventricle: Normal right ventricular cavity size. Wall thickness is normal. Right ventricle wall motion  is normal. Systolic function is normal.    The left atrium is mildly dilated.    Pulmonary Artery: The estimated pulmonary artery systolic pressure is 32 mmHg.    IVC/SVC: Normal venous pressure at 3 mmHg.     Vitals    Height Weight BMI (Calculated) BSA (Calculated - sq m) BP Pulse   5' 5" (1.651 m) 70.8 kg (156 lb) 26 1.8 sq meters 114/74 65     Study Details A complete echo was performed using complete 2D, color flow Doppler and spectral Doppler. During the study, the apical, parasternal and subcostal views were captured.  Overall the study quality was adequate. This was a portable study performed at the " patient's bedside.     Echocardiography Findings    Left Ventricle The left ventricle is normal in size. Normal wall thickness. Normal wall motion. There is normal systolic function with a visually estimated ejection fraction of 60 - 65%. There is normal diastolic function.   Right Ventricle Normal right ventricular cavity size. Wall thickness is normal. Right ventricle wall motion  is normal. Systolic function is normal.   Left Atrium Left atrium is mildly dilated.   Right Atrium Normal right atrial size.   Aortic Valve The aortic valve is a trileaflet valve. There is normal leaflet mobility. Aortic valve peak velocity is 1.13 m/s. Mean gradient is 3 mmHg.   Mitral Valve The mitral valve is structurally normal. There is normal leaflet mobility.   Tricuspid Valve The tricuspid valve is structurally normal. There is normal leaflet mobility. There is trace regurgitation.   Pulmonic Valve The pulmonic valve is structurally normal. There is normal leaflet mobility. There is trace regurgitation.   IVC/SVC Normal venous pressure at 3 mmHg.   Ascending Aorta Aortic root is normal in size measuring 3.42 cm. Ascending aorta is normal measuring 2.96 cm.   Pericardium and Other Findings There is no pericardial effusion.   Pulmonary Artery The estimated pulmonary artery systolic pressure is 32 mmHg.     Exam Details    Performed Procedure Technologist     Transthoracic echo (TTE) complete Maik Espana             Begin Exam End Exam     1/8/2024 12:30 PM CST 1/8/2024  1:10 PM CST            Procedure Name Study Review Link PACS Link Status Accession Number Location    01/04/24 02:41 PM MRI Pelvis Without Contrast Study Review  Images Final [id number] [facility]   01/04/24 07:31 AM X-Ray Hip 2 or 3 views Right (with Pelvis when performed) Study Review  Images Final [id number] [facility]   01/08/24 01:10 PM Echo Study Review  Images Final [id number] [facility]     2023    Date Procedure Name Study Review Link PACS Link Status Accession Number  Location   12/23/23 09:27 AM X-Ray Hip 2 or 3 views Left (with Pelvis when performed) Study Review  Final

## 2024-01-11 NOTE — PLAN OF CARE
Problem: Adult Inpatient Plan of Care  Goal: Patient-Specific Goal (Individualized)  Outcome: Ongoing, Progressing     Problem: Impaired Wound Healing  Goal: Optimal Wound Healing  Outcome: Ongoing, Progressing     Problem: Skin Injury Risk Increased  Goal: Skin Health and Integrity  Outcome: Ongoing, Progressing     Problem: Fall Injury Risk  Goal: Absence of Fall and Fall-Related Injury  Outcome: Ongoing, Progressing

## 2024-01-11 NOTE — ASSESSMENT & PLAN NOTE
Patient's anemia is currently controlled. Has not received any PRBCs to date. Etiology likely due to Iron deficiency and chronic disease due to LUDWIN and malnutrition   and chronic inflammation from chronic infection.  Current CBC reviewed-   Lab Results   Component Value Date    HGB 9.0 (L) 01/11/2024    HCT 30.9 (L) 01/11/2024     Monitor serial CBC and transfuse if patient becomes hemodynamically unstable, symptomatic or H/H drops below 7/21.  -iron/b12/folate wnl. Labs reviewed and hg stable on 1/7
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
## 2024-01-11 NOTE — HOSPITAL COURSE
Patient admitted with signicfiant right hip septic arhtritis with bone destruction with osteomeylitis of right dside of pelvis as seen by MRI. Blood cultures taken on admit. Ortho consulted and patient taken to OR with Orhto on 1/6/2024 and underwent irrigation and drainage  of right hip and thigh abscess with arthrotomy of right hip by Dr. Khai Boone. Post-op as per Ortho patient was weight bear as tolerated to right lower extremity. Infectious Disease consulted and patient placed on empiric IV Vancomycin and ceftriaxone. Surgical wound cultures taken in OR. Blood cultures from 1/6 returned positive for MRSA and repeated on 1/7 and remained positive for MRSA so further blood cultures sent on 1/10. TTE done and showed no vegetations. Wound cultures from right hip area also grew MRSA. Patient started having issues with agitation and drug withdrawal and placed on meds to help with withdrawal in hospital. Once cultures resulted, ID adjusted his antibiotcs and patient on IV Vancomycin alone to treat infection and will need long term IV antibiotics on hospital discharge but due to his recent IV drug use not a candidate for home IV antibiotics so SW/CM working on LTACH placement on discharge to get his IV Vancomycin that pharmacy is dosing and monitoring levels of in hospital. PT/OT consulted post-op and working with patient in hospital and patient progressing well with therapy. Patient on multimodals for post-op pain control. Patient on contact isolation for MRSA. Patient accepted to [facility] and awaiting bacteremia clearance to place PICC line and hopeful discharge for 1/15. Patient on 1/12 decided he wanted to leave against medical advice. Wife arrived and he told his wife he was leaving against medical advice. She left as was upset. His brothers came upstairs and willing to take patient home. Patient advised by myself that is not in his best interest to leave. AMA form was signed by patient and patient deemed  competent to sign form. Midline removed by nursing prior to discharge. I discussed with patient about taking oral Zyvox 600 mg po BID to treat his MRSA hip infection and bacteremia and he was agreeable. [facility]. It was approved by his insurance with $0 co pay. Advised patient to  medication at [facility] pharmacy on the way out. I notified Bk Turner from ID consult team of patient leaving [place] and he stated they would call to set up outpatient follow-up for patient.

## 2024-01-11 NOTE — PLAN OF CARE
Problem: Adult Inpatient Plan of Care  Goal: Plan of Care Review  Outcome: Ongoing, Progressing  Goal: Patient-Specific Goal (Individualized)  Outcome: Ongoing, Progressing  Goal: Absence of Hospital-Acquired Illness or Injury  Outcome: Ongoing, Progressing  Goal: Optimal Comfort and Wellbeing  Outcome: Ongoing, Progressing  Goal: Readiness for Transition of Care  Outcome: Ongoing, Progressing     Problem: Impaired Wound Healing  Goal: Optimal Wound Healing  Outcome: Ongoing, Progressing    AAOx4, VSS, Patient remains on contact Isolation r/t MRSA in urine. NADN. {Ain controlled with PRN meds. Bed in lowest position, call light in place along with personal. Will continue to round as scheduled and as needed.
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
## 2024-01-11 NOTE — ASSESSMENT & PLAN NOTE
- Much improved on 1/11. Patient cooperative and agreeable to discharge plan to LTACH. No signs of drug withdrawal.   - Patient with signs of withdrawal on 1/7 and 1/8 but patient refusing meds to help with withdrawal but improving 1/10.  - Okay to go outside to smoke cigarettes to help with withdrawal, charge nursing aware and okay with this.

## 2024-01-12 ENCOUNTER — TELEPHONE (OUTPATIENT)
Dept: HEPATOLOGY | Facility: CLINIC | Age: 49
End: 2024-01-12
Payer: COMMERCIAL

## 2024-01-12 VITALS
HEIGHT: 65 IN | SYSTOLIC BLOOD PRESSURE: 120 MMHG | RESPIRATION RATE: 19 BRPM | TEMPERATURE: 98 F | OXYGEN SATURATION: 99 % | DIASTOLIC BLOOD PRESSURE: 74 MMHG | WEIGHT: 156 LBS | HEART RATE: 81 BPM | BODY MASS INDEX: 25.99 KG/M2

## 2024-01-12 LAB
ALBUMIN SERPL BCP-MCNC: 2.6 G/DL (ref 3.5–5.2)
ALP SERPL-CCNC: 105 U/L (ref 55–135)
ALT SERPL W/O P-5'-P-CCNC: 15 U/L (ref 10–44)
ANION GAP SERPL CALC-SCNC: 7 MMOL/L (ref 8–16)
AST SERPL-CCNC: 16 U/L (ref 10–40)
BACTERIA BLD CULT: NORMAL
BASOPHILS # BLD AUTO: 0.03 K/UL (ref 0–0.2)
BASOPHILS NFR BLD: 0.5 % (ref 0–1.9)
BILIRUB SERPL-MCNC: 0.2 MG/DL (ref 0.1–1)
BUN SERPL-MCNC: 22 MG/DL (ref 6–20)
CALCIUM SERPL-MCNC: 9.3 MG/DL (ref 8.7–10.5)
CHLORIDE SERPL-SCNC: 109 MMOL/L (ref 95–110)
CO2 SERPL-SCNC: 22 MMOL/L (ref 23–29)
CREAT SERPL-MCNC: 1.1 MG/DL (ref 0.5–1.4)
DIFFERENTIAL METHOD BLD: ABNORMAL
EOSINOPHIL # BLD AUTO: 0.1 K/UL (ref 0–0.5)
EOSINOPHIL NFR BLD: 0.9 % (ref 0–8)
ERYTHROCYTE [DISTWIDTH] IN BLOOD BY AUTOMATED COUNT: 19.9 % (ref 11.5–14.5)
EST. GFR  (NO RACE VARIABLE): >60 ML/MIN/1.73 M^2
GLUCOSE SERPL-MCNC: 97 MG/DL (ref 70–110)
HCT VFR BLD AUTO: 32.3 % (ref 40–54)
HGB BLD-MCNC: 9.3 G/DL (ref 14–18)
IMM GRANULOCYTES # BLD AUTO: 0.03 K/UL (ref 0–0.04)
IMM GRANULOCYTES NFR BLD AUTO: 0.5 % (ref 0–0.5)
LYMPHOCYTES # BLD AUTO: 1.9 K/UL (ref 1–4.8)
LYMPHOCYTES NFR BLD: 28.9 % (ref 18–48)
MCH RBC QN AUTO: 20.2 PG (ref 27–31)
MCHC RBC AUTO-ENTMCNC: 28.8 G/DL (ref 32–36)
MCV RBC AUTO: 70 FL (ref 82–98)
MONOCYTES # BLD AUTO: 0.5 K/UL (ref 0.3–1)
MONOCYTES NFR BLD: 6.8 % (ref 4–15)
NEUTROPHILS # BLD AUTO: 4.1 K/UL (ref 1.8–7.7)
NEUTROPHILS NFR BLD: 62.4 % (ref 38–73)
NRBC BLD-RTO: 0 /100 WBC
PLATELET # BLD AUTO: 505 K/UL (ref 150–450)
PMV BLD AUTO: 8.3 FL (ref 9.2–12.9)
POTASSIUM SERPL-SCNC: 3.6 MMOL/L (ref 3.5–5.1)
PROT SERPL-MCNC: 8.5 G/DL (ref 6–8.4)
RBC # BLD AUTO: 4.6 M/UL (ref 4.6–6.2)
SODIUM SERPL-SCNC: 138 MMOL/L (ref 136–145)
VANCOMYCIN TROUGH SERPL-MCNC: 48.8 UG/ML (ref 10–22)
WBC # BLD AUTO: 6.61 K/UL (ref 3.9–12.7)

## 2024-01-12 PROCEDURE — 80053 COMPREHEN METABOLIC PANEL: CPT | Performed by: HOSPITALIST

## 2024-01-12 PROCEDURE — 25000003 PHARM REV CODE 250

## 2024-01-12 PROCEDURE — 87040 BLOOD CULTURE FOR BACTERIA: CPT | Performed by: INTERNAL MEDICINE

## 2024-01-12 PROCEDURE — 85025 COMPLETE CBC W/AUTO DIFF WBC: CPT | Performed by: HOSPITALIST

## 2024-01-12 PROCEDURE — 25000003 PHARM REV CODE 250: Performed by: HOSPITALIST

## 2024-01-12 PROCEDURE — 63600175 PHARM REV CODE 636 W HCPCS: Performed by: HOSPITALIST

## 2024-01-12 PROCEDURE — 36415 COLL VENOUS BLD VENIPUNCTURE: CPT | Mod: XB | Performed by: INTERNAL MEDICINE

## 2024-01-12 PROCEDURE — 80202 ASSAY OF VANCOMYCIN: CPT | Performed by: INTERNAL MEDICINE

## 2024-01-12 RX ORDER — LINEZOLID 600 MG/1
600 TABLET, FILM COATED ORAL EVERY 12 HOURS
Qty: 84 TABLET | Refills: 0 | Status: SHIPPED | OUTPATIENT
Start: 2024-01-06 | End: 2024-02-27

## 2024-01-12 RX ADMIN — METHOCARBAMOL 750 MG: 750 TABLET ORAL at 10:01

## 2024-01-12 RX ADMIN — VANCOMYCIN HYDROCHLORIDE 1000 MG: 1 INJECTION, POWDER, LYOPHILIZED, FOR SOLUTION INTRAVENOUS at 04:01

## 2024-01-12 RX ADMIN — OXYCODONE HYDROCHLORIDE 10 MG: 10 TABLET ORAL at 04:01

## 2024-01-12 RX ADMIN — THERA TABS 1 TABLET: TAB at 10:01

## 2024-01-12 RX ADMIN — COLLAGENASE SANTYL: 250 OINTMENT TOPICAL at 09:01

## 2024-01-12 RX ADMIN — OXYCODONE HYDROCHLORIDE 10 MG: 10 TABLET ORAL at 11:01

## 2024-01-12 RX ADMIN — Medication 100 MG: at 10:01

## 2024-01-12 RX ADMIN — PREGABALIN 75 MG: 50 CAPSULE ORAL at 10:01

## 2024-01-12 RX ADMIN — FOLIC ACID 1 MG: 1 TABLET ORAL at 10:01

## 2024-01-12 RX ADMIN — ACETAMINOPHEN 1000 MG: 325 TABLET ORAL at 10:01

## 2024-01-12 NOTE — ASSESSMENT & PLAN NOTE
- Present on admit and related to septic arthritis and chronic open wounds to arms due to injection drug use.   Patient with positive blood cultures with MRSA on 1/5 and 1/6 and 1/7.   - Repeat blood cultures from 1/10 so far no growth but need to monitor. Repeat blood cultures again today on 1/12.   - Transthoracic echo without signs of endocarditis on this admit.   - ID following and appreciate recs and continue IV Vancomycin to treat.

## 2024-01-12 NOTE — CONSULTS
Pharmacokinetic Assessment Follow Up: IV Vancomycin    Vancomycin serum concentration assessment(s):    The trough level was drawn incorrectly 2 hour post dose and cannot be used to guide therapy at this time.    Vancomycin Regimen Plan:    Continue regimen to Vancomycin 1000 mg IV every 12 hours with next serum trough concentration measured at 1630 prior to next dose on 1/12/24    Drug levels (last 3 results):  Recent Labs   Lab Result Units 01/10/24  1524 01/12/24  0553   Vancomycin-Trough ug/mL 18.6 48.8*       Pharmacy will continue to follow and monitor vancomycin.    Please contact pharmacy at extension [phone] for questions regarding this assessment.    Thank you for the consult,   Yaritza Antonio       Patient brief summary:  Barrington Amin is a 48 y.o. male initiated on antimicrobial therapy with IV Vancomycin for treatment of bone/joint infection    The patient's current regimen is vancomycin IV 1000mg every 12 hours     Drug Allergies:   Review of patient's allergies indicates:  No Known Allergies    Actual Body Weight:   70.8kg    Renal Function:   Estimated Creatinine Clearance: 72.2 mL/min (based on SCr of 1.1 mg/dL).,     Dialysis Method (if applicable):  N/A    CBC (last 72 hours):  Recent Labs   Lab Result Units 01/10/24  0252 01/11/24  0546 01/12/24  0553   WBC K/uL 8.61 8.07 6.61   Hemoglobin g/dL 9.1* 9.0* 9.3*   Hematocrit % 30.7* 30.9* 32.3*   Platelets K/uL 624* 613* 505*   Gran % % 67.5 65.9 62.4   Lymph % % 25.6 24.5 28.9   Mono % % 5.8 7.9 6.8   Eosinophil % % 0.5 0.9 0.9   Basophil % % 0.3 0.4 0.5   Differential Method  Automated Automated Automated       Metabolic Panel (last 72 hours):  Recent Labs   Lab Result Units 01/10/24  0252 01/11/24  0546 01/12/24  0553   Sodium mmol/L 138 138 138   Potassium mmol/L 3.7 3.5 3.6   Chloride mmol/L 106 107 109   CO2 mmol/L 22* 22* 22*   Glucose mg/dL 77 102 97   BUN mg/dL 19 18 22*   Creatinine mg/dL 1.1 0.9 1.1   Albumin g/dL 2.6* 2.7* 2.6*   Total Bilirubin  mg/dL 0.3 0.3 0.2   Alkaline Phosphatase U/L 94 98 105   AST U/L 24 19 16   ALT U/L 18 16 15       Vancomycin Administrations:  vancomycin given in the last 96 hours                     vancomycin (VANCOCIN) 1,000 mg in dextrose 5 % (D5W) 250 mL IVPB (Vial-Mate) (mg) 1,000 mg New Bag 01/12/24 0412     1,000 mg New Bag 01/11/24 1910     1,000 mg New Bag  0401     1,000 mg New Bag 01/10/24 1711     1,000 mg New Bag  0417     1,000 mg New Bag 01/09/24 1530     1,000 mg New Bag  0446     1,000 mg New Bag 01/08/24 1635                    Microbiologic Results:  Microbiology Results (last 7 days)       Procedure Component Value Units Date/Time    Blood culture [[id number]] Collected: 01/07/24 0415    Order Status: Completed Specimen: Blood Updated: 01/12/24 0612     Blood Culture, Routine No growth after 5 days.    Narrative:      Collection has been rescheduled by SF4 at 01/07/2024 03:59 Reason:   Unable to collect patient is a hard stick rn notified   Collection has been rescheduled by SF4 at 01/07/2024 03:59 Reason:   Unable to collect patient is a hard stick rn notified     Blood culture [[id number]]  (Abnormal)  (Susceptibility) Collected: 01/07/24 0415    Order Status: Completed Specimen: Blood Updated: 01/11/24 1146     Blood Culture, Routine Gram stain aer bottle: Gram positive cocci in clusters resembling Staph      Results called to and read back by: Glo Cassidy RN 01/09/2024  05:26      METHICILLIN RESISTANT STAPHYLOCOCCUS AUREUS  ID consult required at [facility] and [place] locations.      Narrative:      Collection has been rescheduled by SF4 at 01/07/2024 03:59 Reason:   Unable to collect patient is a hard stick rn notified   Collection has been rescheduled by SF4 at 01/07/2024 03:59 Reason:   Unable to collect patient is a hard stick rn notified     Blood culture [[id number]] Collected: 01/10/24 0803    Order Status: Completed Specimen: Blood from Antecubital, Right Updated: 01/11/24 1012      Blood Culture, Routine No Growth to date      No Growth to date    Blood culture [[id number]] Collected: 01/10/24 0803    Order Status: Completed Specimen: Blood Updated: 01/11/24 1012     Blood Culture, Routine No Growth to date      No Growth to date    Fungus culture [[id number]] Collected: 01/06/24 0851    Order Status: Completed Specimen: Incision site from Hip, Right Updated: 01/11/24 0907     Fungus (Mycology) Culture Culture in progress    Fungus culture [[id number]] Collected: 01/06/24 0851    Order Status: Completed Specimen: Incision site from Hip, Right Updated: 01/11/24 0754     Fungus (Mycology) Culture Culture in progress    Fungus culture [[id number]] Collected: 01/06/24 0851    Order Status: Completed Specimen: Incision site from Hip, Right Updated: 01/11/24 0754     Fungus (Mycology) Culture Culture in progress    Blood culture #1 **CANNOT BE ORDERED STAT** [[id number]] Collected: 01/05/24 2031    Order Status: Completed Specimen: Blood from Peripheral, Antecubital, Left Updated: 01/10/24 2212     Blood Culture, Routine No growth after 5 days.    Culture, Anaerobe [[id number]] Collected: 01/06/24 0851    Order Status: Completed Specimen: Incision site from Hip, Right Updated: 01/10/24 1037     Anaerobic Culture No anaerobes isolated    Narrative:      3    Culture, Anaerobe [[id number]] Collected: 01/06/24 0851    Order Status: Completed Specimen: Incision site from Hip, Right Updated: 01/10/24 1037     Anaerobic Culture No anaerobes isolated    Narrative:      1    Culture, Anaerobe [[id number]] Collected: 01/06/24 0851    Order Status: Completed Specimen: Incision site from Hip, Right Updated: 01/10/24 1037     Anaerobic Culture No anaerobes isolated    Narrative:      2    Blood culture [[id number]]     Order Status: Canceled Specimen: Blood     Blood culture [[id number]]     Order Status: Canceled Specimen: Blood     Tissue culture [[id number]]  (Abnormal) Collected: 01/06/24 0851    Order  Status: Completed Specimen: Tissue from Hip, Right Updated: 01/09/24 1027     Aerobic Culture - Tissue METHICILLIN RESISTANT STAPHYLOCOCCUS AUREUS  Few  For susceptibility see order # [id number]       Gram Stain Result Moderate WBC's      No organisms seen    Narrative:      3    Tissue culture [[id number]]  (Abnormal) Collected: 01/06/24 0851    Order Status: Completed Specimen: Tissue from Hip, Right Updated: 01/09/24 1027     Aerobic Culture - Tissue METHICILLIN RESISTANT STAPHYLOCOCCUS AUREUS  Few  For susceptibility see order # [id number]       Gram Stain Result No WBC's      No organisms seen    Narrative:      1    Tissue culture [[id number]]  (Abnormal)  (Susceptibility) Collected: 01/06/24 0851    Order Status: Completed Specimen: Tissue from Hip, Right Updated: 01/09/24 1026     Aerobic Culture - Tissue METHICILLIN RESISTANT STAPHYLOCOCCUS AUREUS  Few       Gram Stain Result Many WBC's      Rare Gram positive cocci    Narrative:      2    MRSA/SA Rapid ID by PCR from Blood culture [[id number]]  (Abnormal) Collected: 01/07/24 0415    Order Status: Completed Updated: 01/09/24 0646     Staph aureus ID by PCR Positive     Methicillin Resistant ID by PCR Positive    Blood culture #2 **CANNOT BE ORDERED STAT** [[id number]]  (Abnormal)  (Susceptibility) Collected: 01/05/24 2017    Order Status: Completed Specimen: Blood from Peripheral, Forearm, Right Updated: 01/08/24 1113     Blood Culture, Routine Gram stain derrick bottle: Gram positive cocci in clusters resembling Staph      Results called to and read back by: Mahi Mckeon LPN 01/06/2024  14:52      METHICILLIN RESISTANT STAPHYLOCOCCUS AUREUS  ID consult required at [facility],[place] and [facility].      Blood culture [[id number]]  (Abnormal) Collected: 01/05/24 2017    Order Status: Completed Specimen: Blood from Peripheral, Forearm, Right Updated: 01/08/24 1111     Blood Culture, Routine Gram stain aer bottle: Gram positive cocci in clusters  resembling Staph      Positive results previously called 01/06/2024      METHICILLIN RESISTANT STAPHYLOCOCCUS AUREUS  ID consult required at [facility],[place] and [facility].  For susceptibility see order #[id number]      Rapid Organism ID by PCR (from Blood culture) [[id number]]  (Abnormal) Collected: 01/05/24 2017    Order Status: Completed Updated: 01/06/24 1624     Enterococcus faecalis Not Detected     Enterococcus faecium Not Detected     Listeria monocytogenes Not Detected     Staphylococcus spp. See species for ID     Staphylococcus aureus Detected     Staphylococcus epidermidis Not Detected     Staphylococcus lugdunensis Not Detected     Streptococcus species Not Detected     Streptococcus agalactiae Not Detected     Streptococcus pneumoniae Not Detected     Streptococcus pyogenes Not Detected     Acinetobacter calcoaceticus/baumannii complex Not Detected     Bacteroides fragilis Not Detected     Enterobacterales Not Detected     Enterobacter cloacae complex Not Detected     Escherichia coli Not Detected     Klebsiella aerogenes Not Detected     Klebsiella oxytoca Not Detected     Klebsiella pneumoniae group Not Detected     Proteus Not Detected     Salmonella sp Not Detected     Serratia marcescens Not Detected     Haemophilus influenzae Not Detected     Neisseria meningtidis Not Detected     Pseudomonas aeruginosa Not Detected     Stenotrophomonas maltophilia Not Detected     Candida albicans Not Detected     Candida auris Not Detected     Candida glabrata Not Detected     Candida krusei Not Detected     Candida parapsilosis Not Detected     Candida tropicalis Not Detected     Cryptococcus neoformans/gattii Not Detected     CTX-M (ESBL ) Test Not Applicable     IMP (Carbapenem resistant) Test Not Applicable     KPC resistance gene (Carbapenem resistant) Test Not Applicable     mcr-1  Test Not Applicable     mec A/C  Test Not Applicable     mec A/C and MREJ (MRSA) gene Detected     NDM  (Carbapenem resistant) Test Not Applicable     OXA-48-like (Carbapenem resistant) Test Not Applicable     van A/B (VRE gene) Test Not Applicable     VIM (Carbapenem resistant) Test Not Applicable    Blood culture [[id number]]     Order Status: Canceled Specimen: Blood     Blood culture [[id number]]     Order Status: Canceled Specimen: Blood     Aerobic culture [[id number]] Collected: 01/06/24 0851    Order Status: Canceled Specimen: Incision site from Hip, Right     Aerobic culture [[id number]] Collected: 01/06/24 0851    Order Status: Canceled Specimen: Incision site from Hip, Right     Aerobic culture [[id number]] Collected: 01/06/24 0851    Order Status: Canceled Specimen: Incision site from Hip, Right

## 2024-01-12 NOTE — ASSESSMENT & PLAN NOTE
Malnutrition Type:  Context: acute illness or injury  Level: moderate    Related to (etiology):   Physiological condition    Signs and Symptoms (as evidenced by):   Significant unintentional edita loss, mild fat depletion, and moderate muscle depletion      Malnutrition Characteristic Summary:  Weight Loss (Malnutrition): greater than 2% in 1 week (3% in 1 week)  Subcutaneous Fat (Malnutrition): mild depletion  Muscle Mass (Malnutrition): moderate depletion      Interventions/Recommendations (treatment strategy):  1. Continue Regular Diet. Encourage small, frequent meals/snacks. 2. Consider appetite stimulant, if appropriate. 3. Continue Ensure Plus- High Protein with all meals. 4. Recommend MVI with folic acid and thiamine supplementation. 5. RD following.    Nutrition Diagnosis Status:   New

## 2024-01-12 NOTE — NURSING
"Nurse stated she will do patients wound care. Patient replies "my wife can do it. She's been doing it."  "

## 2024-01-12 NOTE — SUBJECTIVE & OBJECTIVE
Interval History: So far blood cultures from 1/10 no growth. Will order repeat blood culture today to make sure MRSA has cleared. Ordered ultrasound of bilateral arms to have better look at arms to make sure no underlying infection as recommended by ID. Patient today was getting anxious an asking about going home. I reminded him of the serious nature of his infection as had MRSA bacteremia in addition to his septic right hip joint and that it was in his best interest to remain in hospital to get IV antibiotics required to treat his infection I reminded him if he wanted to leave he would need to sign out AMA as that is not his recommended treatment. Patient okay for staying for now. If he were to leave AMA then ID recommended to discharge patient on Zyvox 600 mg po BID for his treatment course but that is not his best or ideal option. Plan [facility] LTACH placement on discharge. Will hold off on placing PICC line until we now for sure he is staying and going to LTACH. Labs reviewed. Hgb stable at 9.3 and creatinine stable at 1.1. Pharmacy reported Vancomycin level that resulted today at 48.8 was drawn at wrong time and drawn after Vancomycin was infusing and not a trough. Pharmacy to have lab redraw level prior to next dose and recommended to continue current dosing of Vancomycin.     Review of Systems   Constitutional:  Negative for fever.   Respiratory:  Negative for shortness of breath.    Cardiovascular:  Negative for chest pain.   Gastrointestinal:  Negative for abdominal pain, diarrhea and nausea.   Skin:  Positive for wound (Chronic wounds to bilateral arms).   Neurological:  Negative for dizziness.   Psychiatric/Behavioral:  Negative for agitation and confusion.      Objective:     Vital Signs (Most Recent):  Temp: 98 °F (36.7 °C) (01/12/24 1221)  Pulse: 81 (01/12/24 1221)  Resp: 19 (01/12/24 1221)  BP: 120/74 (01/12/24 1221)  SpO2: 99 % (01/12/24 1221) on room air Vital Signs (24h Range):  Temp:  [98 °F (36.7  °C)-98.3 °F (36.8 °C)] 98 °F (36.7 °C)  Pulse:  [] 81  Resp:  [16-20] 19  SpO2:  [97 %-100 %] 99 %  BP: (116-178)/(65-97) 120/74     Weight: 70.8 kg (156 lb)  Body mass index is 25.96 kg/m².  No intake or output data in the 24 hours ending 01/12/24 1357      Physical Exam  Constitutional:       General: He is awake. He is not in acute distress.     Appearance: He is well-developed. He is ill-appearing.   HENT:      Mouth/Throat:      Pharynx: No oropharyngeal exudate.   Eyes:      Conjunctiva/sclera: Conjunctivae normal.   Cardiovascular:      Rate and Rhythm: Normal rate and regular rhythm.      Heart sounds: Normal heart sounds. No murmur heard.  Pulmonary:      Effort: Pulmonary effort is normal. No respiratory distress.      Breath sounds: Normal breath sounds. No wheezing.   Abdominal:      General: Abdomen is flat. Bowel sounds are normal. There is no distension.      Palpations: Abdomen is soft.      Tenderness: There is no abdominal tenderness.   Musculoskeletal:         General: Swelling (mild swelling over R hip) and tenderness (TTP over R hip with limited ROM due to pain) present.      Comments: Bandages to right hip and right and left arms.   Skin:     General: Skin is warm and dry.      Findings: Lesion (large are of chronic wounds over bilateral upper and lower extremities w/o eschar or drainage (see pictures under media section)) present. No rash.      Comments: Bandages to bilateral upper arms.   Neurological:      Mental Status: He is alert and oriented to person, place, and time.      Cranial Nerves: No cranial nerve deficit.      Motor: No abnormal muscle tone.      Coordination: Coordination normal.      Deep Tendon Reflexes: Reflexes are normal and symmetric. Reflexes normal.   Psychiatric:         Mood and Affect: Mood normal.         Behavior: Behavior normal. Behavior is cooperative.         Thought Content: Thought content normal.         Judgment: Judgment normal.              Significant Labs: CBC:   Recent Labs   Lab 01/11/24  0546 01/12/24  0553   WBC 8.07 6.61   HGB 9.0* 9.3*   HCT 30.9* 32.3*   * 505*     CMP:   Recent Labs   Lab 01/11/24  0546 01/12/24  0553    138   K 3.5 3.6    109   CO2 22* 22*    97   BUN 18 22*   CREATININE 0.9 1.1   CALCIUM 9.6 9.3   PROT 9.0* 8.5*   ALBUMIN 2.7* 2.6*   BILITOT 0.3 0.2   ALKPHOS 98 105   AST 19 16   ALT 16 15   ANIONGAP 9 7*       Significant Imaging: I have reviewed all pertinent imaging results/findings within the past 24 hours.   (2) very moist

## 2024-01-12 NOTE — PLAN OF CARE
Recommendations    1. Continue Regular Diet. Encourage small, frequent meals/snacks.   2. Consider appetite stimulant, if appropriate.   3. Continue Ensure Plus- High Protein with all meals.   4. Recommend MVI with folic acid and thiamine supplementation.   5. RD following.    Goals: Meet % EEN/EPN by follow-up date.  Nutrition Goal Status: progressing towards goal  Communication of RD Recs: other (comment) (POC)

## 2024-01-12 NOTE — ASSESSMENT & PLAN NOTE
Present on admit. Body mass index is 25.96 kg/m². Albumin low at 2.7 on admit. Related to long term substance abuse.   Nutrition consulted and appreciate recs.    Continue Boost supplements with all meal and switch to high protein diet.
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
## 2024-01-12 NOTE — PLAN OF CARE
Wife arrived and he told his wife he was leaving against medical advice. She left as was upset. His brothers came upstairs and willing to take patient home. Patient advised by myself that is not in his best interest to leave. I discussed with patient about taking oral Zyvox 600 mg po BID to treat his MRSA hip infection and bacteremia and he was agreeable. [facility]. It was approved by his insurance with $0 co pay. Advised patient to  medication at [facility] pharmacy on the way out. I notified Bk Turner from ID consult team of patient leaving AM and he stated they would call to set up outpatient follow-up for patient.     CORNELIA PAPPAS MD  Attending Staff Physician   Department of Hospital Medicine, [facility]  Pager: [phone]  Spectralink: [phone]
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
## 2024-01-12 NOTE — CONSULTS
Single lumen 18G, 10CM midline placed in the left basilic vein. Needle advanced into the vessel under real time ultrasound guidance. Image recorded and saved.    Max dwell date 2/9/24.  Lot number: [id number]

## 2024-01-12 NOTE — ASSESSMENT & PLAN NOTE
Patient's anemia is currently controlled. Has not received any PRBCs to date. Etiology likely due to Iron deficiency and chronic disease due to LUDWIN and malnutrition   and chronic inflammation from chronic infection.  Current CBC reviewed-   Lab Results   Component Value Date    HGB 9.3 (L) 01/12/2024    HCT 32.3 (L) 01/12/2024     Monitor serial CBC and transfuse if patient becomes hemodynamically unstable, symptomatic or H/H drops below 7/21.  -iron/b12/folate wnl. Labs reviewed and hg stable on 1/7

## 2024-01-12 NOTE — TELEPHONE ENCOUNTER
Wilbert Davis PA-C ordered that patient be scheduled for a hepatology consult visit for hep c.  Patient hep c quant positive and currently hospitalized.  Appt with PA Scheuermann scheduled 2/9/24; appt reminder notice mailed.

## 2024-01-12 NOTE — PROGRESS NOTES
[facility]  Adult Nutrition  Progress Note    SUMMARY       Recommendations    1. Continue Regular Diet. Encourage small, frequent meals/snacks. 2. Consider appetite stimulant, if appropriate.   3. Continue Ensure Plus- High Protein with all meals.   4. Recommend MVI with folic acid and thiamine supplementation.  5. RD following.    Goals: Meet % EEN/EPN by follow-up date.  Nutrition Goal Status: progressing towards goal  Communication of RD Recs: other (comment) (POC)    Assessment and Plan      Moderate malnutrition  Malnutrition Type:  Context: acute illness or injury  Level: moderate    Related to (etiology):   Physiological condition    Signs and Symptoms (as evidenced by):   Significant unintentional edita loss, mild fat depletion, and moderate muscle depletion      Malnutrition Characteristic Summary:  Weight Loss (Malnutrition): greater than 2% in 1 week (3% in 1 week)  Subcutaneous Fat (Malnutrition): mild depletion  Muscle Mass (Malnutrition): moderate depletion      Interventions/Recommendations (treatment strategy):  1. Continue Regular Diet. Encourage small, frequent meals/snacks. 2. Consider appetite stimulant, if appropriate. 3. Continue Ensure Plus- High Protein with all meals. 4. Recommend MVI with folic acid and thiamine supplementation. 5. RD following.    Nutrition Diagnosis Status:   New         Malnutrition Assessment  Malnutrition Context: acute illness or injury  Malnutrition Level: moderate  Skin (Micronutrient): wounds unhealed  Nails (Micronutrient): none  Hair/Scalp (Micronutrient): none  Eyes (Micronutrient): none  Extraoral (Micronutrient): none  Gums (Micronutrient): none  Lips/Mucous Membranes (Micronutrient): none  Teeth (Micronutrient): none  Tongue (Micronutrient): none  Neck/Chest (Micronutrient): none  Musculoskeletal/Lower Extremities: none   Micronutrient Evaluation Summary: no deficiencies   Weight Loss (Malnutrition): greater than 2% in 1 week (3% in 1  "week)  Subcutaneous Fat (Malnutrition): mild depletion  Muscle Mass (Malnutrition): moderate depletion   Orbital Region (Subcutaneous Fat Loss): mild depletion  Upper Arm Region (Subcutaneous Fat Loss): mild depletion   [place] Region (Muscle Loss): moderate depletion  Clavicle Bone Region (Muscle Loss): severe depletion  Clavicle and Acromion Bone Region (Muscle Loss): moderate depletion  Scapular Bone Region (Muscle Loss): mild depletion                 Reason for Assessment    Reason For Assessment: RD follow-up  Diagnosis: other (see comments) (Pyogenic arthritis of right hip)  Relevant Medical History: HTN, IVDU  Interdisciplinary Rounds: did not attend  General Information Comments: RD f/u, pt in pain during assessment. Endorses almost 100% intake of meals & 3 ONS/day. LBM 1/11 NFPE completed 1/12, meets ASPEN criteria for moderate malnutrition. RD following.   Nutrition Discharge Planning: Pending Clinical Course    Nutrition Risk Screen    Nutrition Risk Screen: no indicators present    Nutrition/Diet History    Patient Reported Diet/Restrictions/Preferences: general  Spiritual, Cultural Beliefs, Hinduism Practices, Values that Affect Care: no  Food Allergies: NKFA  Factors Affecting Nutritional Intake: decreased appetite, pain    Anthropometrics    Temp: 98 °F (36.7 °C)  Height Method: Stated  Height: 5' 5" (165.1 cm)  Height (inches): 65 in  Weight Method: Bed Scale  Weight: 70.8 kg (156 lb)  Weight (lb): 156 lb  Ideal Body Weight (IBW), Male: 136 lb  % Ideal Body Weight, Male (lb): 114.71 %  BMI (Calculated): 26       Lab/Procedures/Meds    Pertinent Labs Reviewed: reviewed  Pertinent Labs Comments: H/h: 9.3/32.3, mcv: 70, mch:20.52, mchc: 28.8, Bun 22  Pertinent Medications Reviewed: reviewed  Pertinent Medications Comments: Folic acid, mv, thiamine, abx; PRN D10. Glucagon, glucose, senna-docusate    Estimated/Assessed Needs    Weight Used For Calorie Calculations: 70.8 kg (156 lb)  Energy Calorie " Requirements (kcal): 1962 kcal (MSJ x 1.3)  Energy Need Method: Foley-St Venus  Protein Requirements: 71-85 g/d (1.0-1.2 g/kg)  Weight Used For Protein Calculations: 70.8 kg (156 lb)        RDA Method (mL): 1962         Nutrition Prescription Ordered    Current Diet Order: Regular  Oral Nutrition Supplement: Ensure Plus Vanilla (All meals)    Evaluation of Received Nutrient/Fluid Intake    I/O: -622.5 ml since admit  Comments: LBM 1/11  % Intake of Estimated Energy Needs: 75 - 100 %  % Meal Intake: 75 - 100 %    Nutrition Risk    Level of Risk/Frequency of Follow-up: moderate (1-2x/ week)     Monitor and Evaluation    Food and Nutrient Intake: energy intake, food and beverage intake  Food and Nutrient Adminstration: diet order  Knowledge/Beliefs/Attitudes: food and nutrition knowledge/skill, beliefs and attitudes  Physical Activity and Function: nutrition-related ADLs and IADLs, factors affecting access to physical activity  Anthropometric Measurements: weight, weight change, body mass index  Biochemical Data, Medical Tests and Procedures: lipid profile, inflammatory profile, glucose/endocrine profile, gastrointestinal profile, electrolyte and renal panel  Nutrition-Focused Physical Findings: skin, head and eyes, extremities, muscles and bones, overall appearance     Nutrition Follow-Up    RD Follow-up?: Yes    Antionette Wiseman RD, LDN

## 2024-01-12 NOTE — PT/OT/SLP PROGRESS
Occupational Therapy      Patient Name:  Barrington Amin   MRN:  [id number]    Patient not seen today secondary to  pt leaving AMA.     1/12/2024

## 2024-01-12 NOTE — NURSING
Pt states he wants to leave AMA, Leandra NICOLAS notified, discussed with pt, pt was firm and wanted to leave , ML dcd with tip intact , gauze and pressure applied , pt signed AMA paperwork , pt is waiting for his brother to pick him up
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
## 2024-01-12 NOTE — CARE UPDATE
[facility] Update    AAOx3- wants to leave AMA. Advised nurse to pull midline ASAP and fill out AMA paperwork.Dr. Fairbanks aware.      Leandra Hackett PA-C  [facility]

## 2024-01-13 NOTE — ASSESSMENT & PLAN NOTE
- Present on admit and related to septic arthritis and chronic open wounds to arms due to injection drug use.   Patient with positive blood cultures with MRSA on 1/5 and 1/6 and 1/7.   - Repeat blood cultures from 1/10 so far no growth.  - Transthoracic echo without signs of endocarditis on this admit.   - ID following and appreciate recs and continue IV Vancomycin to treat.
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
## 2024-01-13 NOTE — ASSESSMENT & PLAN NOTE
- Much improved on 1/11 and resolved on discharge on 1/12. Patient cooperative and agreeable to discharge plan to LTACH. No signs of drug withdrawal.   - Patient with signs of withdrawal on 1/7 and 1/8 but patient refusing meds to help with withdrawal but improving 1/10.  - Okay to go outside to smoke cigarettes to help with withdrawal, charge nursing aware and okay with this.

## 2024-01-13 NOTE — ASSESSMENT & PLAN NOTE
Present on admit. Body mass index is 25.96 kg/m². Albumin low at 2.7 on admit. Related to long term substance abuse.   Nutrition consulted and appreciate recs.    recommend Boost or equivalent supplements on discharge.

## 2024-01-13 NOTE — ASSESSMENT & PLAN NOTE
Acute osteomyelitis of pelvis and femur   - Patient left AMA from hospital on 1/12. IV removed prior to discharge and sent out on Zyvox 600 mg po BID to treat infection for 42 days. Patient advised this is unlikely to be adequate treatment for his MRSA septic joint and bacteremia and he stated he understood but wanted to leave against advice anyway.   - Patient admitted with septic arthritis of right hip secondary to osteomyelitis in the right acetabulum as seen on MRI (01/04/24) in a patient with IVDU and chronic open arm wounds due to injection use as nidus.  - Ortho consulted on admit and patient taken to OR and had incision and drainage and right hip arthrotomy on 1/7/2024. Surgical wound cultures grew MRSA. Chronic damage seen and long term will need hip replacement if patient can stay sober and can clear infection but that will be in distant future.   - Post-op, patient is weight bear as tolerated to right lower extremity. PT/OT consulted post-op and recommending low intensity therapy.   - [facility] for [facility] term IV abx, wound care and PT/OT on discharge. Not candidate for home IV abx as is active IV drug user. Patient aware and agreeable to LTACH.   - Pain controlled. Continue pain control with multimodals and limit opiates due to his known opiate addiction.   - ID consulted and following and assisting in management of his right hip infection due to MRSA. Continue IV Vancomycin to treat and pharmacy is dosing and monitoring levels. Appreciate recs.   - MRSA contact precautions.

## 2024-01-15 LAB
BACTERIA BLD CULT: NORMAL
BACTERIA BLD CULT: NORMAL
HCV GENTYP SERPL NAA+PROBE: NORMAL

## 2024-01-17 ENCOUNTER — PATIENT MESSAGE (OUTPATIENT)
Dept: ORTHOPEDICS | Facility: CLINIC | Age: 49
End: 2024-01-17
Payer: COMMERCIAL

## 2024-01-17 LAB — BACTERIA BLD CULT: NORMAL

## 2024-01-18 NOTE — PLAN OF CARE
[facility] - Surgery  Discharge Final Note    Primary Care Provider: Unable, To Obtain    Expected Discharge Date: 1/12/2024    Final Discharge Note (most recent)       Final Note - 01/12/24 1622          Final Note    Assessment Type Final Discharge Note     Anticipated Discharge Disposition Left Against Medical Advice                   Future Appointments   Date Time Provider Department Center   1/23/2024  8:15 AM Betty Tadeo PA-C [facility]   1/25/2024  3:00 PM Kavya Rayo APRN, ANP [facility]   2/9/2024  1:00 PM Scheuermann, Jennifer B., PA [facility]

## 2024-01-22 NOTE — PROGRESS NOTES
Principal Orthopedic Problem: Pyogenic arthritis of right hip, due to unspecified organism      01/06/23:   IRRIGATION AND DRAINAGE ABSCESS RIGHT THIGH WITH ARTHROTOMY OF RIGHT HIP     Mr. Amin is here today for a post-operative visit    Interval History:  he reports that he is doing much better than he was before.   he is at home . he is not participating in PT/OT.   Pain is controlled.  he is not taking pain medication.  Repots last used heroin 3 days ago. Only used cocaine once since discharge. Reports he understands that he needs to quit and always wants to but is really not ready.   Reports taking antibiotics as prescribed.   Patient removed his own bandage 4 days ago.   he denies fever, chills, and sweats .     Physical exam:    Patient arrives to exam room: wheelchair.  Patient is un accompanied        RADS: All pertinent images were reviewed by myself:   none done today    Assessment:  Post-op visit ( 2 weeks)    Plan:  Current care, treatment plan, precautions, activity level/ modifications, limitations, rehabilitation exercises and proposed future treatment were discussed with the patient. We discussed the need to monitor for changes in symptoms and condition and report them to the physician.  Discussed importance of compliance with all appointments and follow up examinations.     WOUND CARE :  - The patient was advised to keep the incision clean and dry for the next 24 hours after which he may wash the area with antibacterial soap in the shower. Will not submerge until the incision is completely healed  -Patient was advised to monitor wound closely and multiple times daily for any problems. Call clinic immediately or report to ED for immediate medical attention for any complications including reopening of wound, drainage, purulence, redness, streaking, odor, pain out of proportion, fever, chills, etc.       ACTIVITY:   - as tolerated  -range of motion as tolerated    - WBAT      PAIN MEDICATION:   -  Multimodal pain control  - Pain medication: refill was not needed  - Pain medication refill policy provided to patient for review, yes.    - Patient was informed a multi-modal approach is used to treat their pain. With the goal to get off of narcotic pain medication and discontinue as soon as possible.   - ice and elevation to reduce pain and swelling     OTHER:   Is to continue antibiotics as prescribed     I discussed with the patient at length the seriousness of his right hip joint osteo myelitis and septic arthritis.  We discussed that there was already irreparable damage to the hip joint itself. He understands that with the procedure  procedure we only help with the infection the unlikely too much for his hip pain.  Long-term he would need to  show that he is able to be compliant and off of IV drugs prior to any discussion about hip arthroplasty.      If there are any questions prior to scheduled follow up, the patient was instructed to contact the office

## 2024-01-23 ENCOUNTER — OFFICE VISIT (OUTPATIENT)
Dept: ORTHOPEDICS | Facility: CLINIC | Age: 49
End: 2024-01-23
Payer: COMMERCIAL

## 2024-01-23 VITALS — BODY MASS INDEX: 26 KG/M2 | WEIGHT: 156.06 LBS | HEIGHT: 65 IN

## 2024-01-23 DIAGNOSIS — M86.159: Primary | ICD-10-CM

## 2024-01-23 DIAGNOSIS — M00.051 STAPHYLOCOCCAL ARTHRITIS OF RIGHT HIP: ICD-10-CM

## 2024-01-23 PROCEDURE — 99024 POSTOP FOLLOW-UP VISIT: CPT | Mod: S$GLB,,, | Performed by: PHYSICIAN ASSISTANT

## 2024-01-23 PROCEDURE — 1159F MED LIST DOCD IN RCRD: CPT | Mod: CPTII,S$GLB,, | Performed by: PHYSICIAN ASSISTANT

## 2024-01-23 PROCEDURE — 99999 PR PBB SHADOW E&M-EST. PATIENT-LVL III: CPT | Mod: PBBFAC,,, | Performed by: PHYSICIAN ASSISTANT

## 2024-02-06 NOTE — PROGRESS NOTES
Subjective:      Patient ID: Barrington Amin is a 48 y.o. male.    Chief Complaint:Follow-up  Right hip septic arthritis    History of Present Illness    Mr. Barrington Amin is here for hospital follow up of right hip septic arthritis/osteo.  This patient is new to me.   He is accompanied by his wife.      In review, he is a 48 year old, with active IVDU chronic HCV and chronic wounds of bilateral upper and lower extremities who presented to ED on 1/5 with right hip native septic arthritis with associated osteomyelitis of right acetabulum, myositis, and intramuscular abscess shown on outpatient MRI of 1/4.       Blood cultures on admission were + for MRSA.  He underwent  surgical I&D and washout  of right hip 1/6/24.  Per OP note he has irreparable damage to hip joint and would benefit from hip replacement but not current candidate because of active IVDU.      Surgical cultures + for MRSA   Blood cultures cleared 1/7 and 1/10.  TTE was negative.      He was placed on IV vancomycin with plan for LTAC placement for long term IV abx.  Was accepted to LTAC and was awaiting clearance of blood cultures for PICC placement when he he left [place] on 1/12.  Per review of records, he was discharge on oral Zyvox to treat his bacteremia and hip infection.      He saw Orthopedics on 1/22.  Per their last note they discussed the seriousness of his condition, the already irreparable damage to the hip, and that he would need to show off IV drugs prior to any consideration of hip replacement.     He discloses that he is still using IV drugs.  He reports adherence with the antibiotic.  He is not taking muscle relaxants or pain medication  Denies any pain at rest, but he does have pain with weight bearing (he is walking with a walker).  Reports his surgical incision is well healed.  Wife reports drainage stopped about a week and a half ago.  Denies fevers, chills, sweats.  No n/v/d.  He has chronic bilateral upper extremity skin wounds.  He follows  with a Wound Care in [facility].  Last seen 3 weeks ago.  His wife does dressing changes between visits and states the wounds are much improved.        Review of Systems   Constitutional: Negative for chills, fever, malaise/fatigue and night sweats.   HENT: Negative.     Eyes: Negative.    Cardiovascular:  Negative for chest pain, leg swelling and palpitations.   Respiratory:  Negative for cough, shortness of breath and sputum production.    Hematologic/Lymphatic: Negative for adenopathy.   Skin:  Positive for poor wound healing (bilateral upper arms). Negative for rash.   Musculoskeletal:  Positive for joint pain. Negative for joint swelling.   Gastrointestinal:  Negative for abdominal pain, diarrhea, nausea and vomiting.   Genitourinary: Negative.    Neurological:  Negative for numbness and paresthesias.   Psychiatric/Behavioral:  Positive for substance abuse. The patient is not nervous/anxious.      Objective:   Physical Exam  Vitals reviewed.   Constitutional:       General: He is not in acute distress.     Appearance: He is not ill-appearing or diaphoretic.   HENT:      Head: Normocephalic and atraumatic.   Eyes:      General: No scleral icterus.     Conjunctiva/sclera: Conjunctivae normal.   Cardiovascular:      Rate and Rhythm: Normal rate and regular rhythm.   Pulmonary:      Effort: Pulmonary effort is normal. No respiratory distress.      Breath sounds: Normal breath sounds.   Abdominal:      General: There is no distension.      Palpations: Abdomen is soft.   Musculoskeletal:      Cervical back: Normal range of motion.      Comments: Right hip incision healing well.  No drainage, no periwound erythema, no swelling/fluctuance   Skin:     General: Skin is warm and dry.      Findings: No rash.      Comments: Bilateral upper extremity chronic wounds are dressed.    Removed RUE dressings - wounds with pink granular tissue.  No signs of localized infection   Neurological:      Mental Status: He is alert  and oriented to person, place, and time.   Psychiatric:         Behavior: Behavior normal.       2/8/24 1/22/24      Assessment:     1. Staphylococcal arthritis of right hip due to MRSA s/p I+D on 1/6/2024    2. MRSA bacteremia    3. Acute osteomyelitis of pelvis and femur    4. Substance use disorder    5. History of heroin abuse      48 year old with active IVDU,  chronic HCV, and chronic wounds of bilateral upper and lower extremities recently admitted with MRSA bacteremia (TTE negative), right  hip native septic arthritis with associated osteomyelitis of right acetabulum, myositis, and intramuscular abscess, s/p surgical I&D and washout  of right hip 1/6/24.  Surgical cultures + for MRSA.  Per OP note he has irreparable damage to hip joint and would benefit from hip replacement but not current candidate because of active IVDU.   Left AMA, declining LTAC for IV antibiotics.  Sent out on oral linezolid.  Surgical site is healing, no drainage.  No current systemic signs of infection.      Plan:    Stop linezolid.  Appears to have had 4 weeks of this.  Check cbc, cmp, crp today.   Start doxycycline 100 mg every 12 hours.  Take with small amount of food and full glass of water and stay upright for 30 minutes after taking.  Do not take with dairy products, vitamins with minerals, or antacids. Separate by at least 2 hours.  May cause sun sensitivity.  Avoid direct sun exposure.   Continue current wound care with [facility]  Follow up with Hepatology as scheduled  Follow up ID one month or sooner as needed  ED precautions reviewed -  pain/swelling in hip at site of surgical incision, fevers, chills, worsening of arm wounds  The patient and his wife understand and agree to plan of care and all questions were answered.   Encouraged to call with questions or concerns.  Given my contact information.      45 minutes of total time was spent on this encounter, which includes face to face time and non-face to face  time preparing to see the patient (eg, review of tests), Obtaining and/or reviewing separately obtained history, documenting clinical information in the electronic or other health record, independently interpreting results (not separately reported) and communicating results to the patient/family/caregiver, or care coordination (not separately reported).

## 2024-02-07 LAB
FUNGUS SPEC CULT: NORMAL

## 2024-02-08 ENCOUNTER — OFFICE VISIT (OUTPATIENT)
Dept: INFECTIOUS DISEASES | Facility: CLINIC | Age: 49
End: 2024-02-08
Payer: COMMERCIAL

## 2024-02-08 ENCOUNTER — TELEPHONE (OUTPATIENT)
Dept: INFECTIOUS DISEASES | Facility: CLINIC | Age: 49
End: 2024-02-08

## 2024-02-08 ENCOUNTER — HOSPITAL ENCOUNTER (OUTPATIENT)
Facility: HOSPITAL | Age: 49
Discharge: HOME | End: 2024-02-09
Attending: EMERGENCY MEDICINE | Admitting: EMERGENCY MEDICINE
Payer: COMMERCIAL

## 2024-02-08 VITALS
TEMPERATURE: 98 F | HEART RATE: 84 BPM | WEIGHT: 156.06 LBS | SYSTOLIC BLOOD PRESSURE: 119 MMHG | BODY MASS INDEX: 26 KG/M2 | HEIGHT: 65 IN | DIASTOLIC BLOOD PRESSURE: 76 MMHG

## 2024-02-08 DIAGNOSIS — B95.62 MRSA BACTEREMIA: ICD-10-CM

## 2024-02-08 DIAGNOSIS — R53.1 GENERALIZED WEAKNESS: ICD-10-CM

## 2024-02-08 DIAGNOSIS — F19.90 SUBSTANCE USE DISORDER: ICD-10-CM

## 2024-02-08 DIAGNOSIS — M00.051 STAPHYLOCOCCAL ARTHRITIS OF RIGHT HIP: Primary | ICD-10-CM

## 2024-02-08 DIAGNOSIS — D64.9 ANEMIA REQUIRING TRANSFUSIONS: Primary | ICD-10-CM

## 2024-02-08 DIAGNOSIS — R78.81 MRSA BACTEREMIA: ICD-10-CM

## 2024-02-08 DIAGNOSIS — M86.159: ICD-10-CM

## 2024-02-08 DIAGNOSIS — F11.11 HISTORY OF HEROIN ABUSE: ICD-10-CM

## 2024-02-08 DIAGNOSIS — D64.9 ANEMIA, UNSPECIFIED TYPE: ICD-10-CM

## 2024-02-08 LAB
ABO + RH BLD: NORMAL
ALBUMIN SERPL BCP-MCNC: 3.6 G/DL (ref 3.5–5.2)
ALP SERPL-CCNC: 134 U/L (ref 55–135)
ALT SERPL W/O P-5'-P-CCNC: 18 U/L (ref 10–44)
ANION GAP SERPL CALC-SCNC: 12 MMOL/L (ref 8–16)
AST SERPL-CCNC: 20 U/L (ref 10–40)
BASOPHILS # BLD AUTO: 0.01 K/UL (ref 0–0.2)
BASOPHILS NFR BLD: 0.2 % (ref 0–1.9)
BILIRUB SERPL-MCNC: 0.2 MG/DL (ref 0.1–1)
BLD GP AB SCN CELLS X3 SERPL QL: NORMAL
BLD PROD TYP BPU: NORMAL
BLOOD UNIT EXPIRATION DATE: NORMAL
BLOOD UNIT TYPE CODE: 7300
BLOOD UNIT TYPE: NORMAL
BNP SERPL-MCNC: 82 PG/ML (ref 0–99)
BUN SERPL-MCNC: 12 MG/DL (ref 6–20)
CALCIUM SERPL-MCNC: 9.7 MG/DL (ref 8.7–10.5)
CHLORIDE SERPL-SCNC: 103 MMOL/L (ref 95–110)
CO2 SERPL-SCNC: 26 MMOL/L (ref 23–29)
CODING SYSTEM: NORMAL
CREAT SERPL-MCNC: 1.2 MG/DL (ref 0.5–1.4)
CROSSMATCH INTERPRETATION: NORMAL
DIFFERENTIAL METHOD BLD: ABNORMAL
DISPENSE STATUS: NORMAL
EOSINOPHIL # BLD AUTO: 0.1 K/UL (ref 0–0.5)
EOSINOPHIL NFR BLD: 1.4 % (ref 0–8)
ERYTHROCYTE [DISTWIDTH] IN BLOOD BY AUTOMATED COUNT: 21 % (ref 11.5–14.5)
EST. GFR  (NO RACE VARIABLE): >60 ML/MIN/1.73 M^2
GLUCOSE SERPL-MCNC: 85 MG/DL (ref 70–110)
HCT VFR BLD AUTO: 24.8 % (ref 40–54)
HGB BLD-MCNC: 7.2 G/DL (ref 14–18)
IMM GRANULOCYTES # BLD AUTO: 0.02 K/UL (ref 0–0.04)
IMM GRANULOCYTES NFR BLD AUTO: 0.5 % (ref 0–0.5)
LYMPHOCYTES # BLD AUTO: 1.3 K/UL (ref 1–4.8)
LYMPHOCYTES NFR BLD: 32 % (ref 18–48)
MCH RBC QN AUTO: 20.9 PG (ref 27–31)
MCHC RBC AUTO-ENTMCNC: 29 G/DL (ref 32–36)
MCV RBC AUTO: 72 FL (ref 82–98)
MONOCYTES # BLD AUTO: 0.3 K/UL (ref 0.3–1)
MONOCYTES NFR BLD: 7.6 % (ref 4–15)
NEUTROPHILS # BLD AUTO: 2.4 K/UL (ref 1.8–7.7)
NEUTROPHILS NFR BLD: 58.3 % (ref 38–73)
NRBC BLD-RTO: 0 /100 WBC
PLATELET # BLD AUTO: 303 K/UL (ref 150–450)
PMV BLD AUTO: 8.5 FL (ref 9.2–12.9)
POTASSIUM SERPL-SCNC: 4 MMOL/L (ref 3.5–5.1)
PROT SERPL-MCNC: 9.3 G/DL (ref 6–8.4)
RBC # BLD AUTO: 3.45 M/UL (ref 4.6–6.2)
SODIUM SERPL-SCNC: 141 MMOL/L (ref 136–145)
SPECIMEN OUTDATE: NORMAL
TRANS ERYTHROCYTES VOL PATIENT: NORMAL ML
TROPONIN I SERPL DL<=0.01 NG/ML-MCNC: 0.02 NG/ML (ref 0–0.03)
WBC # BLD AUTO: 4.19 K/UL (ref 3.9–12.7)

## 2024-02-08 PROCEDURE — 84484 ASSAY OF TROPONIN QUANT: CPT

## 2024-02-08 PROCEDURE — P9021 RED BLOOD CELLS UNIT: HCPCS

## 2024-02-08 PROCEDURE — 36430 TRANSFUSION BLD/BLD COMPNT: CPT

## 2024-02-08 PROCEDURE — 80053 COMPREHEN METABOLIC PANEL: CPT | Mod: 91

## 2024-02-08 PROCEDURE — 3074F SYST BP LT 130 MM HG: CPT | Mod: CPTII,S$GLB,, | Performed by: NURSE PRACTITIONER

## 2024-02-08 PROCEDURE — 99215 OFFICE O/P EST HI 40 MIN: CPT | Mod: S$GLB,,, | Performed by: NURSE PRACTITIONER

## 2024-02-08 PROCEDURE — 86920 COMPATIBILITY TEST SPIN: CPT

## 2024-02-08 PROCEDURE — 93005 ELECTROCARDIOGRAM TRACING: CPT

## 2024-02-08 PROCEDURE — 99285 EMERGENCY DEPT VISIT HI MDM: CPT | Mod: 25

## 2024-02-08 PROCEDURE — 1160F RVW MEDS BY RX/DR IN RCRD: CPT | Mod: CPTII,S$GLB,, | Performed by: NURSE PRACTITIONER

## 2024-02-08 PROCEDURE — 86850 RBC ANTIBODY SCREEN: CPT

## 2024-02-08 PROCEDURE — 1111F DSCHRG MED/CURRENT MED MERGE: CPT | Mod: CPTII,S$GLB,, | Performed by: NURSE PRACTITIONER

## 2024-02-08 PROCEDURE — 85025 COMPLETE CBC W/AUTO DIFF WBC: CPT | Mod: 91

## 2024-02-08 PROCEDURE — 83880 ASSAY OF NATRIURETIC PEPTIDE: CPT

## 2024-02-08 PROCEDURE — 3008F BODY MASS INDEX DOCD: CPT | Mod: CPTII,S$GLB,, | Performed by: NURSE PRACTITIONER

## 2024-02-08 PROCEDURE — 3078F DIAST BP <80 MM HG: CPT | Mod: CPTII,S$GLB,, | Performed by: NURSE PRACTITIONER

## 2024-02-08 PROCEDURE — 25000003 PHARM REV CODE 250: Performed by: PHYSICIAN ASSISTANT

## 2024-02-08 PROCEDURE — 87040 BLOOD CULTURE FOR BACTERIA: CPT

## 2024-02-08 PROCEDURE — 99999 PR PBB SHADOW E&M-EST. PATIENT-LVL V: CPT | Mod: PBBFAC,,, | Performed by: NURSE PRACTITIONER

## 2024-02-08 PROCEDURE — 1159F MED LIST DOCD IN RCRD: CPT | Mod: CPTII,S$GLB,, | Performed by: NURSE PRACTITIONER

## 2024-02-08 PROCEDURE — G0378 HOSPITAL OBSERVATION PER HR: HCPCS

## 2024-02-08 PROCEDURE — 93010 ELECTROCARDIOGRAM REPORT: CPT | Mod: ,,, | Performed by: INTERNAL MEDICINE

## 2024-02-08 RX ORDER — ONDANSETRON 4 MG/1
4 TABLET, ORALLY DISINTEGRATING ORAL EVERY 6 HOURS PRN
Status: DISCONTINUED | OUTPATIENT
Start: 2024-02-08 | End: 2024-02-09 | Stop reason: HOSPADM

## 2024-02-08 RX ORDER — IBUPROFEN 800 MG/1
TABLET ORAL
COMMUNITY
Start: 2023-10-04 | End: 2024-02-09

## 2024-02-08 RX ORDER — MELOXICAM 7.5 MG/1
7.5 TABLET ORAL DAILY
COMMUNITY

## 2024-02-08 RX ORDER — DOXYCYCLINE HYCLATE 100 MG
100 TABLET ORAL EVERY 12 HOURS
Status: DISCONTINUED | OUTPATIENT
Start: 2024-02-08 | End: 2024-02-09 | Stop reason: HOSPADM

## 2024-02-08 RX ORDER — CYCLOBENZAPRINE HCL 5 MG
10 TABLET ORAL NIGHTLY
Status: DISCONTINUED | OUTPATIENT
Start: 2024-02-08 | End: 2024-02-09 | Stop reason: HOSPADM

## 2024-02-08 RX ORDER — HYDROXYZINE HYDROCHLORIDE 25 MG/1
25 TABLET, FILM COATED ORAL 3 TIMES DAILY
COMMUNITY
Start: 2023-12-02 | End: 2024-02-08

## 2024-02-08 RX ORDER — TALC
6 POWDER (GRAM) TOPICAL NIGHTLY PRN
Status: DISCONTINUED | OUTPATIENT
Start: 2024-02-08 | End: 2024-02-09 | Stop reason: HOSPADM

## 2024-02-08 RX ORDER — IBUPROFEN 400 MG/1
800 TABLET ORAL 3 TIMES DAILY PRN
Status: DISCONTINUED | OUTPATIENT
Start: 2024-02-08 | End: 2024-02-09 | Stop reason: HOSPADM

## 2024-02-08 RX ORDER — DOXYCYCLINE HYCLATE 100 MG
100 TABLET ORAL 2 TIMES DAILY PRN
Status: DISCONTINUED | OUTPATIENT
Start: 2024-02-08 | End: 2024-02-08

## 2024-02-08 RX ORDER — DOXYCYCLINE HYCLATE 100 MG
100 TABLET ORAL EVERY 12 HOURS
Qty: 60 TABLET | Refills: 2 | Status: SHIPPED | OUTPATIENT
Start: 2024-02-08 | End: 2024-05-06

## 2024-02-08 RX ORDER — CYCLOBENZAPRINE HCL 10 MG
10 TABLET ORAL NIGHTLY
COMMUNITY
Start: 2023-10-04 | End: 2024-02-09

## 2024-02-08 RX ORDER — ACETAMINOPHEN 325 MG/1
650 TABLET ORAL EVERY 6 HOURS PRN
Status: DISCONTINUED | OUTPATIENT
Start: 2024-02-08 | End: 2024-02-09 | Stop reason: HOSPADM

## 2024-02-08 RX ORDER — HYDROCODONE BITARTRATE AND ACETAMINOPHEN 500; 5 MG/1; MG/1
TABLET ORAL
Status: DISCONTINUED | OUTPATIENT
Start: 2024-02-08 | End: 2024-02-09 | Stop reason: HOSPADM

## 2024-02-08 RX ORDER — CYCLOBENZAPRINE HCL 5 MG
5 TABLET ORAL
COMMUNITY
Start: 2023-12-27 | End: 2024-02-08

## 2024-02-08 RX ORDER — IBUPROFEN 200 MG
200 TABLET ORAL EVERY 6 HOURS PRN
COMMUNITY
Start: 2023-12-02 | End: 2024-02-08

## 2024-02-08 RX ORDER — PHENYTOIN SODIUM 100 MG/1
100 CAPSULE, EXTENDED RELEASE ORAL DAILY
COMMUNITY

## 2024-02-08 RX ORDER — POLYETHYLENE GLYCOL 3350 17 G/17G
17 POWDER, FOR SOLUTION ORAL DAILY PRN
COMMUNITY

## 2024-02-08 RX ORDER — SODIUM CHLORIDE 0.9 % (FLUSH) 0.9 %
10 SYRINGE (ML) INJECTION
Status: DISCONTINUED | OUTPATIENT
Start: 2024-02-08 | End: 2024-02-09 | Stop reason: HOSPADM

## 2024-02-08 RX ORDER — DIPHENHYDRAMINE HCL 25 MG
25 CAPSULE ORAL EVERY 6 HOURS PRN
Status: DISCONTINUED | OUTPATIENT
Start: 2024-02-08 | End: 2024-02-09 | Stop reason: HOSPADM

## 2024-02-08 RX ORDER — NALOXONE HYDROCHLORIDE 4 MG/.1ML
1 SPRAY NASAL
COMMUNITY
Start: 2023-12-03

## 2024-02-08 RX ADMIN — CYCLOBENZAPRINE HYDROCHLORIDE 10 MG: 5 TABLET, FILM COATED ORAL at 11:02

## 2024-02-08 RX ADMIN — DOXYCYCLINE HYCLATE 100 MG: 100 TABLET, FILM COATED ORAL at 11:02

## 2024-02-08 NOTE — ED PROVIDER NOTES
Encounter Date: 2/8/2024       History     Chief Complaint   Patient presents with    Abnormal Lab     Pt had labs today, told to come to ED for low Hgb.      Barrington Amin is a 48 y.o. male with a PMH of pelvic and right hip osteomyelitis s/p washout, IVDU, heroin use disorder, and chronic HCV presenting to [facility] Emergency Department for evaluation of low hemoglobin on routine labs today.  Patient reports feeling fatigued and having generalized weakness x 1 week.  Denies fever, chills, lightheadedness, dyspnea, chest pain, nausea, emesis, abdominal pain, blood in his stool, hematuria, diarrhea, lower extremity edema, or dysuria.  He is bilateral upper extremity wounds that have been improving and he sees wound care for them regularly his wife is at bedside to help provide history.    The history is provided by the patient, medical records and the spouse. No  was used.     Review of patient's allergies indicates:   Allergen Reactions    Cephalosporins Rash     Past Medical History:   Diagnosis Date    Hypertension      Past Surgical History:   Procedure Laterality Date    ARTHROTOMY OF HIP Right 1/6/2024    Procedure: IRRIGATION AND DRAINAGE ABSCESS RIGHT THIGH WITH ARTHROTOMY OF RIGHT HIP, TRIOS, ANTIBOTIC BEADS;  Surgeon: Khai Boone MD;  Location: [facility] OR [address] [place] [address];  Service: Orthopedics;  Laterality: Right;     History reviewed. No pertinent family history.  Social History     Tobacco Use    Smoking status: Never    Smokeless tobacco: Never   Substance Use Topics    Alcohol use: Never    Drug use: Never     Review of Systems    Physical Exam     Initial Vitals [02/08/24 1438]   BP Pulse Resp Temp SpO2   118/74 81 20 97.8 °F (36.6 °C) 100 %      MAP       --         Physical Exam    Nursing note and vitals reviewed.  Constitutional: No distress.   HENT:   Head: Normocephalic.   Mouth/Throat: Oropharynx is clear and moist.   Eyes: Pupils are equal, round, and reactive to light. No scleral icterus.    Neck: Neck supple.   Cardiovascular:  Normal rate and regular rhythm.           Pulmonary/Chest: Breath sounds normal. No stridor. No respiratory distress.   Abdominal: Abdomen is soft. He exhibits no distension. There is no abdominal tenderness.   Genitourinary: Rectum:      Guaiac result negative.   Guaiac negative stool.    Genitourinary Comments: RN as chaperone   Brown stool in rectal vault, no melena or hematochezia   Normal rectum        Musculoskeletal:      Cervical back: Neck supple.      Comments: 1+ pitting edema to bilateral lower extremities      Neurological: He is alert. GCS score is 15. GCS eye subscore is 4. GCS verbal subscore is 5. GCS motor subscore is 6.   Skin: Skin is warm and dry.   Wounds over bilateral upper extremities and left buttock - see images in chart below. Wounds appear clean without purulence, clean dressings, no pain out of proportion, no bullae                                ED Course   Procedures  Labs Reviewed   CBC W/ AUTO DIFFERENTIAL - Abnormal; Notable for the following components:       Result Value    RBC 3.45 (*)     Hemoglobin 7.2 (*)     Hematocrit 24.8 (*)     MCV 72 (*)     MCH 20.9 (*)     MCHC 29.0 (*)     RDW 21.0 (*)     MPV 8.5 (*)     All other components within normal limits   COMPREHENSIVE METABOLIC PANEL - Abnormal; Notable for the following components:    Total Protein 9.3 (*)     All other components within normal limits   CULTURE, BLOOD   CULTURE, BLOOD   B-TYPE NATRIURETIC PEPTIDE   TROPONIN I   TYPE & SCREEN   PREPARE RBC SOFT          Imaging Results              X-Ray Chest AP Portable (Final result)  Result time 02/08/24 18:19:25      Final result by Gerald Sharma MD (02/08/24 18:19:25)                   Impression:      No acute abnormality.  See above comments.      Electronically signed by: Gerald Sharma  Date:    02/08/2024  Time:    18:19               Narrative:    EXAMINATION:  XR CHEST AP PORTABLE    CLINICAL  HISTORY:  edema;    TECHNIQUE:  Single frontal view of the chest was performed.    COMPARISON:  01/14/2013    FINDINGS:  9 cm length of catheter overlies the right chest.  This could be retained tubing associated with a prior shunt catheter.    The lungs are clear, with normal appearance of pulmonary vasculature and no pleural effusion or pneumothorax.    The cardiac silhouette is borderline enlarged..  The hilar and mediastinal contours are unremarkable.    Bones are intact.                                    X-Rays:   Independently Interpreted Readings:   Chest X-Ray: No acute abnormalities.     Medications   0.9%  NaCl infusion (for blood administration) (has no administration in time range)   cyclobenzaprine tablet 10 mg (10 mg Oral Given 2/8/24 2326)   ibuprofen tablet 800 mg (has no administration in time range)   acetaminophen tablet 650 mg (has no administration in time range)   diphenhydrAMINE capsule 25 mg (has no administration in time range)   ondansetron disintegrating tablet 4 mg (has no administration in time range)   sodium chloride 0.9% flush 10 mL (has no administration in time range)   melatonin tablet 6 mg (has no administration in time range)   doxycycline tablet 100 mg (100 mg Oral Given 2/8/24 2326)     Medical Decision Making  47 y/o with chronic skin wounds, IVDU, and osteomyelitis presents for worsening anemia noted on outpatient labs. No signs or symptoms of active bleeding. Patient is afebrile, hemodynamically stable, and in no acute distress on arrival.     Patient consented and 1u PRBC ordered for transfusion.   No other complaints at that time. Is being managed outpatient by PCP and ID.     Patient admitted to EDOU for symptomatic anemia requiring transfusion.     Amount and/or Complexity of Data Reviewed  Independent Historian: spouse  Labs: ordered. Decision-making details documented in ED Course.  Radiology: ordered and independent interpretation performed. Decision-making details  documented in ED Course.    Risk  OTC drugs.  Prescription drug management.                                      Clinical Impression:  Final diagnoses:  [R53.1] Generalized weakness  [D64.9] Anemia, unspecified type  [D64.9] Anemia requiring transfusions (Primary)          ED Disposition Condition    Discharge Stable          ED Prescriptions    None       Follow-up Information       Follow up With Specialties Details Why Contact Info Additional Information    [facility] - Infectious Disease [facility] Infectious Diseases   [address] [facility] [id number] [phone] Main Building, 1st floor near [place] entrance Please park in [facility].  parking is available on the first floor of the main parking garage.             Liset Hitchcock MD  Resident  02/09/24 [id number]

## 2024-02-08 NOTE — ED TRIAGE NOTES
Barrington Amin, a 48 y.o. male presents to the ED w/ complaint of abnormal labs. Pt states he has labs drawn yesterday and was told to come to the ER today due to low blood levels. Pt denies any other complaints at this time. Pt does have bilateral forearm wounds that are currently dressed and covered.     Triage note:  Chief Complaint   Patient presents with    Abnormal Lab     Pt had labs today, told to come to ED for low Hgb.      Review of patient's allergies indicates:   Allergen Reactions    Cephalosporins Rash     Past Medical History:   Diagnosis Date    Hypertension

## 2024-02-08 NOTE — TELEPHONE ENCOUNTER
Notified of critical H/H 5.8/20  Called patient  Spoke to wife.    Advised to go to ED for stat repeat/evaluation/possible ? Transfusion  Patient has been on linezolid.   WBC - mild leukopenia as well, though ANC wnl  Platelets wnl

## 2024-02-08 NOTE — PATIENT INSTRUCTIONS
Stop the linezolid   Start doxycycline 100 mg every 12 hours.  Take with small amount of food and full glass of water and stay upright for 30 minutes after taking.  Do not take with dairy products, vitamins with minerals, or antacids. Separate by at least 2 hours.  May cause sun sensitivity.  Avoid direct sun exposure.   Continue current wound care   Labs today - cbc, cmp, crp  Follow up one month   To ED for pain/swelling in hip at site of surgical incision, fevers, chills, worsening of arm wounds

## 2024-02-08 NOTE — ED NOTES
Nurses Note -- 4 Eyes      2/8/2024   5:57 PM      Skin assessed during: Admit      [] No Altered Skin Integrity Present    []Prevention Measures Documented      [x] Yes- Altered Skin Integrity Present or Discovered   [x] LDA Added if Not in Epic (Describe Wound)   [x] New Altered Skin Integrity was Present on Admit and Documented in LDA   [x] Wound Image Taken    Wound Care Consulted? Yes    Attending Nurse:  ELIZABETH Alvarenga    Second RN/Staff Member:   ELIZABETH Mart

## 2024-02-08 NOTE — ED NOTES
Pt's wounds undressed for physician assessment and redressed according to physician bedside orders.

## 2024-02-08 NOTE — Clinical Note
Diagnosis: Generalized weakness [042268]   Future Attending Provider: NOEMI NEFF [[id number]]   Is the patient being sent to ED Observation?: Yes  
Calm

## 2024-02-09 VITALS
OXYGEN SATURATION: 100 % | WEIGHT: 155 LBS | RESPIRATION RATE: 16 BRPM | HEIGHT: 67 IN | TEMPERATURE: 98 F | HEART RATE: 62 BPM | SYSTOLIC BLOOD PRESSURE: 149 MMHG | BODY MASS INDEX: 24.33 KG/M2 | DIASTOLIC BLOOD PRESSURE: 87 MMHG

## 2024-02-09 LAB
OHS QRS DURATION: 74 MS
OHS QTC CALCULATION: 451 MS

## 2024-02-09 PROCEDURE — G0378 HOSPITAL OBSERVATION PER HR: HCPCS

## 2024-02-09 NOTE — DISCHARGE SUMMARY
ED Observation Unit  Discharge Summary        History of Present Illness:    Barrington Amin is a 48 y.o. male with medical history of pelvic and right hip osteomyelitis s/p washout, IVDU, heroin use disorder, and chronic HCV presenting to the ED with the chief complaint of low hemoglobin on outside labs.      Wife at bedside who assists with history. Reports 1 week of generalized weakness and fatigue. Routine labs today showed HBG 5.8 and advised to come to the ED for evaluation. Denies rectal bleeding. Reports having chronic wounds that ooze but denies other forms of blood loss. F/B Wound care and reports they are improving. Denies history of blood transfusion. No blood thinner use. Denies chest pain, SOB, syncopal episodes. No fever. He is compliant with his PO ABx therapy. Recently switched from Linezolid to Doxycycline by ID. He is due for his evening dose.      In the ED, labs show repeat HBG improved 7.2. Plt 303. CMP unremarkable. BCx obtained an in process. T&S and blood consent obtained. Rectal negative per ED provider. CXR clear. 1 unit RBC ordered for transfusion. Vitals unremarkable.     Observation Course:    Placed in EDOU for completion of blood transfusion. Routine outpatient HBG was 5.8. Repeat in the ED improved to 7.2 before transfusion. No evidence of GI bleed or other blood loss. Patient received 1 unit of RBC. No need to repeat CBC as HBG was already above 7. Advised outpatient follow-up with PCP and infectious disease.     Consultants:    None    Final Diagnosis:  Anemia requiring transfusion    Discharge Condition: Good    Disposition: Home or Self Care     Time spent on the discharge of the patient including review of hospital course with the patient. reviewing discharge medications and arranging follow-up care 35 minutes.  Patient was seen and examined on the date of discharge and determined to be suitable for discharge.    Follow Up:  Future Appointments   Date Time Provider Department Center    2/9/2024  1:00 PM Scheuermann, Jennifer B., PA [facility] [address]   3/8/2024 11:00 AM Kavya Rayo APRN, ANP [facility] [address]

## 2024-02-09 NOTE — PHARMACY MED REC
"Admission Medication History     The home medication history was taken by Sandy Frias.    You may go to "Admission" then "Reconcile Home Medications" tabs to review and/or act upon these items.     The home medication list has been updated by the Pharmacy department.   Please read ALL comments highlighted in yellow.   Please address this information as you see fit.    Feel free to contact us if you have any questions or require assistance.    The medications listed below were removed from the home medication list. Please reorder if appropriate:  Patient reports no longer taking the following medication(s):  Cyclobenzaprine (FLEXERIL) 5 MG tablet  Ibuprofen 200 mg   Hydroxyzine HCL (ATARAX) 25 MG tablet    Medications listed below were obtained from: Patient/family and Analytic software- [employer]  Current Outpatient Medications on File Prior to Encounter   Medication Sig    linezolid (ZYVOX) 600 mg Tab Take 1 tablet (600 mg total) by mouth every 12 (twelve) hours.    meloxicam (MOBIC) 7.5 MG tablet Take 7.5 mg by mouth once daily.    naloxone (NARCAN) 4 mg/actuation Spry 1 spray by Nasal route as needed (opioid reversal).    phenytoin (DILANTIN) 100 MG ER capsule Take 100 mg by mouth once daily.  (Patient reported not taking)    polyethylene glycol (GLYCOLAX) 17 gram PwPk Take 17 g by mouth daily as needed for Constipation.     Potential issues to be addressed PRIOR TO DISCHARGE  Patient reported not taking the following medications: (PHENYTOIN  MG ). These medications remain on the home medication list. Please address accordingly.     Sandy Frias  EXT [id number]    .          "

## 2024-02-09 NOTE — ED NOTES
Pt c/o pain in upper arm. Transfusion paused. IV flushes still. No other signs of transfusion reaction. ROBERT Vasquez notified and brought to bedside.

## 2024-02-09 NOTE — H&P
ED Observation Unit  History and Physical      I assumed care of this patient from the Main ED at onset of observation time, 6:23pm on 02/08/2024.       History of Present Illness:    Barrington Amin is a 48 y.o. male with medical history of pelvic and right hip osteomyelitis s/p washout, IVDU, heroin use disorder, and chronic HCV presenting to the ED with the chief complaint of low hemoglobin on outside labs.     Wife at bedside who assists with history. Reports 1 week of generalized weakness and fatigue. Routine labs today showed HBG 5.8 and advised to come to the ED for evaluation. Denies rectal bleeding. Reports having chronic wounds that ooze but denies other forms of blood loss. F/B Wound care and reports they are improving. Denies history of blood transfusion. No blood thinner use. Denies chest pain, SOB, syncopal episodes. No fever. He is compliant with his PO ABx therapy. Recently switched from Linezolid to Doxycycline by ID. He is due for his evening dose.     In the ED, labs show repeat HBG improved 7.2. Plt 303. CMP unremarkable. BCx obtained an in process. T&S and blood consent obtained. Rectal negative per ED provider. CXR clear. 1 unit RBC ordered for transfusion. Vitals unremarkable.     I reviewed the ED Provider Note dated 2/8/24 prior to my evaluation of this patient.  I reviewed all labs and imaging performed in the Main ED, prior to patient being placed in Observation. Patient was placed in the ED Observation Unit for Anemia of chronic disease.    PMHx   Past Medical History:   Diagnosis Date    Hypertension       Past Surgical History:   Procedure Laterality Date    ARTHROTOMY OF HIP Right 1/6/2024    Procedure: IRRIGATION AND DRAINAGE ABSCESS RIGHT THIGH WITH ARTHROTOMY OF RIGHT HIP, TRIOS, ANTIBOTIC BEADS;  Surgeon: Khai Boone MD;  Location: [facility] OR [address] [place] [address];  Service: Orthopedics;  Laterality: Right;        Family Hx   History reviewed. No pertinent family history.     Social Hx   Social  History     Socioeconomic History    Marital status:    Tobacco Use    Smoking status: Never    Smokeless tobacco: Never   Substance and Sexual Activity    Alcohol use: Never    Drug use: Never     Social Determinants of Health     Financial Resource Strain: Medium Risk (2/8/2024)    Overall Financial Resource Strain (CARDIA)     Difficulty of Paying Living Expenses: Somewhat hard   Food Insecurity: No Food Insecurity (2/8/2024)    Hunger Vital Sign     Worried About Running Out of Food in the Last Year: Never true     Ran Out of Food in the Last Year: Never true   Transportation Needs: Unmet Transportation Needs (2/8/2024)    PRAPARE - Transportation     Lack of Transportation (Medical): Yes     Lack of Transportation (Non-Medical): No   Physical Activity: Inactive (2/8/2024)    Exercise Vital Sign     Days of Exercise per Week: 0 days     Minutes of Exercise per Session: 10 min   Stress: No Stress Concern Present (2/8/2024)    [employer] - Occupational Stress Questionnaire     Feeling of Stress : Only a little   Recent Concern: Stress - Stress Concern Present (1/8/2024)    [employer] - Occupational Stress Questionnaire     Feeling of Stress : To some extent   Social Connections: Moderately Isolated (2/8/2024)    Social Connection and Isolation Panel [NHANES]     Frequency of Communication with Friends and Family: More than three times a week     Frequency of Social Gatherings with Friends and Family: More than three times a week     Attends [employer] Services: Never     Active Member of Clubs or Organizations: No     Attends Club or Organization Meetings: Never     Marital Status:    Housing Stability: Patient Declined (2/8/2024)    Housing Stability Vital Sign     Unable to Pay for Housing in the Last Year: Patient declined     Number of Places Lived in the Last Year: 1     Unstable Housing in the Last Year: Patient declined        Vital  Signs   Vitals:    02/08/24 1800 02/08/24 1830 02/08/24 1900 02/08/24 1949   BP: 138/87 (!) 161/90 138/87 (!) 144/93   Pulse: 64 82 72 71   Resp: 16 20 16 18   Temp:    97.9 °F (36.6 °C)   TempSrc:    Oral   SpO2: 100% 99% 100% 100%   Weight:       Height:            Review of Systems  Review of Systems   Constitutional:  Negative for fever.       Physical Exam  Physical Exam  Constitutional:       General: He is not in acute distress.     Appearance: He is not diaphoretic.   HENT:      Head: Normocephalic and atraumatic.   Eyes:      Conjunctiva/sclera: Conjunctivae normal.      Pupils: Pupils are equal, round, and reactive to light.   Cardiovascular:      Rate and Rhythm: Normal rate and regular rhythm.      Heart sounds: Normal heart sounds.   Pulmonary:      Effort: Pulmonary effort is normal. No respiratory distress.      Breath sounds: Normal breath sounds. No wheezing or rales.   Abdominal:      General: Bowel sounds are normal. There is no distension.      Palpations: Abdomen is soft.      Tenderness: There is no abdominal tenderness.   Musculoskeletal:         General: No tenderness. Normal range of motion.      Cervical back: Normal range of motion and neck supple.      Comments: Wound dressings to upper and lower extremities   Skin:     General: Skin is warm and dry.      Findings: No erythema or rash.   Neurological:      Mental Status: He is alert and oriented to person, place, and time.      Cranial Nerves: No cranial nerve deficit.         Medications:   Scheduled Meds:   cyclobenzaprine  10 mg Oral QHS     Continuous Infusions:  PRN Meds:.0.9%  NaCl infusion (for blood administration), acetaminophen, diphenhydrAMINE, doxycycline, ibuprofen, melatonin, ondansetron, sodium chloride 0.9%      Assessment/Plan:  Anemia  -Routine labs showed HBG 5.8. Repeat in the ED improved 7.2 with no intervention  -Likely 2/2 AoCD. Denies rectal bleeding or other forms of blood loss. Rectal negative per ED provider.    -VS stable. Hemodynamically stable  -Placed in EDOU for 1 unit RBC transfusion. T&S and blood consent obtained. Okay for continued outpatient follow-up after completion.     Case was discussed with the ED provider, Dr. Liset Hitchcock.

## 2024-02-09 NOTE — DISCHARGE INSTRUCTIONS
Follow-up with your primary care provider and infectious disease team for further evaluation.     Continue your Doxycycline as previously directed.     Return to the emergency room for new, worsening, or concerning symptoms.     Future Appointments   Date Time Provider Department Center   2/9/2024  1:00 PM Scheuermann, Jennifer B., PA [facility]   3/8/2024 11:00 AM Kavya Rayo APRN, ANP [facility]

## 2024-02-13 LAB
BACTERIA BLD CULT: NORMAL
BACTERIA BLD CULT: NORMAL

## 2024-03-07 ENCOUNTER — PATIENT MESSAGE (OUTPATIENT)
Dept: INFECTIOUS DISEASES | Facility: CLINIC | Age: 49
End: 2024-03-07
Payer: COMMERCIAL

## 2024-03-08 ENCOUNTER — OFFICE VISIT (OUTPATIENT)
Dept: INFECTIOUS DISEASES | Facility: CLINIC | Age: 49
End: 2024-03-08
Payer: COMMERCIAL

## 2024-03-08 ENCOUNTER — LAB VISIT (OUTPATIENT)
Dept: LAB | Facility: HOSPITAL | Age: 49
End: 2024-03-08
Payer: COMMERCIAL

## 2024-03-08 VITALS
BODY MASS INDEX: 23.67 KG/M2 | HEIGHT: 67 IN | DIASTOLIC BLOOD PRESSURE: 79 MMHG | WEIGHT: 150.81 LBS | HEART RATE: 72 BPM | TEMPERATURE: 98 F | SYSTOLIC BLOOD PRESSURE: 136 MMHG

## 2024-03-08 DIAGNOSIS — F11.11 HISTORY OF HEROIN ABUSE: ICD-10-CM

## 2024-03-08 DIAGNOSIS — B95.62 MRSA BACTEREMIA: ICD-10-CM

## 2024-03-08 DIAGNOSIS — M00.051 STAPHYLOCOCCAL ARTHRITIS OF RIGHT HIP: ICD-10-CM

## 2024-03-08 DIAGNOSIS — M00.051 STAPHYLOCOCCAL ARTHRITIS OF RIGHT HIP: Primary | ICD-10-CM

## 2024-03-08 DIAGNOSIS — Z79.899 LONG TERM CURRENT USE OF ANAKINRA: ICD-10-CM

## 2024-03-08 DIAGNOSIS — R78.81 MRSA BACTEREMIA: ICD-10-CM

## 2024-03-08 DIAGNOSIS — Z79.2 LONG TERM (CURRENT) USE OF ANTIBIOTICS: ICD-10-CM

## 2024-03-08 LAB
ALBUMIN SERPL BCP-MCNC: 3.3 G/DL (ref 3.5–5.2)
ALP SERPL-CCNC: 144 U/L (ref 55–135)
ALT SERPL W/O P-5'-P-CCNC: 24 U/L (ref 10–44)
ANION GAP SERPL CALC-SCNC: 10 MMOL/L (ref 8–16)
AST SERPL-CCNC: 32 U/L (ref 10–40)
BASOPHILS # BLD AUTO: 0.02 K/UL (ref 0–0.2)
BASOPHILS NFR BLD: 0.3 % (ref 0–1.9)
BILIRUB SERPL-MCNC: 0.2 MG/DL (ref 0.1–1)
BUN SERPL-MCNC: 21 MG/DL (ref 6–20)
CALCIUM SERPL-MCNC: 9.7 MG/DL (ref 8.7–10.5)
CHLORIDE SERPL-SCNC: 102 MMOL/L (ref 95–110)
CO2 SERPL-SCNC: 22 MMOL/L (ref 23–29)
CREAT SERPL-MCNC: 1.4 MG/DL (ref 0.5–1.4)
CRP SERPL-MCNC: 10.1 MG/L (ref 0–8.2)
DIFFERENTIAL METHOD BLD: ABNORMAL
EOSINOPHIL # BLD AUTO: 0.1 K/UL (ref 0–0.5)
EOSINOPHIL NFR BLD: 1.9 % (ref 0–8)
ERYTHROCYTE [DISTWIDTH] IN BLOOD BY AUTOMATED COUNT: 24.1 % (ref 11.5–14.5)
EST. GFR  (NO RACE VARIABLE): >60 ML/MIN/1.73 M^2
GLUCOSE SERPL-MCNC: 90 MG/DL (ref 70–110)
HCT VFR BLD AUTO: 32.1 % (ref 40–54)
HGB BLD-MCNC: 9.2 G/DL (ref 14–18)
IMM GRANULOCYTES # BLD AUTO: 0.02 K/UL (ref 0–0.04)
IMM GRANULOCYTES NFR BLD AUTO: 0.3 % (ref 0–0.5)
LYMPHOCYTES # BLD AUTO: 1.8 K/UL (ref 1–4.8)
LYMPHOCYTES NFR BLD: 30.1 % (ref 18–48)
MCH RBC QN AUTO: 22.7 PG (ref 27–31)
MCHC RBC AUTO-ENTMCNC: 28.7 G/DL (ref 32–36)
MCV RBC AUTO: 79 FL (ref 82–98)
MONOCYTES # BLD AUTO: 0.4 K/UL (ref 0.3–1)
MONOCYTES NFR BLD: 7.3 % (ref 4–15)
NEUTROPHILS # BLD AUTO: 3.6 K/UL (ref 1.8–7.7)
NEUTROPHILS NFR BLD: 60.1 % (ref 38–73)
NRBC BLD-RTO: 0 /100 WBC
PLATELET # BLD AUTO: 367 K/UL (ref 150–450)
PMV BLD AUTO: 8.8 FL (ref 9.2–12.9)
POTASSIUM SERPL-SCNC: 5.1 MMOL/L (ref 3.5–5.1)
PROT SERPL-MCNC: 8.9 G/DL (ref 6–8.4)
RBC # BLD AUTO: 4.06 M/UL (ref 4.6–6.2)
SODIUM SERPL-SCNC: 134 MMOL/L (ref 136–145)
WBC # BLD AUTO: 5.91 K/UL (ref 3.9–12.7)

## 2024-03-08 PROCEDURE — 80053 COMPREHEN METABOLIC PANEL: CPT | Performed by: NURSE PRACTITIONER

## 2024-03-08 PROCEDURE — 1159F MED LIST DOCD IN RCRD: CPT | Mod: CPTII,S$GLB,, | Performed by: NURSE PRACTITIONER

## 2024-03-08 PROCEDURE — 85025 COMPLETE CBC W/AUTO DIFF WBC: CPT | Performed by: NURSE PRACTITIONER

## 2024-03-08 PROCEDURE — 3008F BODY MASS INDEX DOCD: CPT | Mod: CPTII,S$GLB,, | Performed by: NURSE PRACTITIONER

## 2024-03-08 PROCEDURE — 99999 PR PBB SHADOW E&M-EST. PATIENT-LVL IV: CPT | Mod: PBBFAC,,, | Performed by: NURSE PRACTITIONER

## 2024-03-08 PROCEDURE — 99213 OFFICE O/P EST LOW 20 MIN: CPT | Mod: S$GLB,,, | Performed by: NURSE PRACTITIONER

## 2024-03-08 PROCEDURE — 36415 COLL VENOUS BLD VENIPUNCTURE: CPT | Performed by: NURSE PRACTITIONER

## 2024-03-08 PROCEDURE — 3078F DIAST BP <80 MM HG: CPT | Mod: CPTII,S$GLB,, | Performed by: NURSE PRACTITIONER

## 2024-03-08 PROCEDURE — 86140 C-REACTIVE PROTEIN: CPT | Performed by: NURSE PRACTITIONER

## 2024-03-08 PROCEDURE — 3075F SYST BP GE 130 - 139MM HG: CPT | Mod: CPTII,S$GLB,, | Performed by: NURSE PRACTITIONER

## 2024-03-08 RX ORDER — FUROSEMIDE 20 MG/1
20 TABLET ORAL
COMMUNITY
Start: 2024-02-21

## 2024-03-08 NOTE — PROGRESS NOTES
Subjective:      Patient ID: Barrington Amin is a 48 y.o. male.    Chief Complaint:Follow-up  Right hip septic arthritis    History of Present Illness    Mr. Barrington Amin is here for hospital follow up of right hip septic arthritis/osteo.  He is accompanied by his wife.      In review, he is a 48 year old, with active IVDU chronic HCV and chronic wounds of bilateral upper and lower extremities who presented to ED on 1/5 with right hip native septic arthritis with associated osteomyelitis of right acetabulum, myositis, and intramuscular abscess shown on outpatient MRI of 1/4.       Blood cultures on admission were + for MRSA.  He underwent  surgical I&D and washout  of right hip 1/6/24.  Per OP note he has irreparable damage to hip joint and would benefit from hip replacement but not current candidate because of active IVDU.      Surgical cultures + for MRSA   Blood cultures cleared 1/7 and 1/10.  TTE was negative.      He was placed on IV vancomycin with plan for LTAC placement for long term IV abx.  Was accepted to LTAC and was awaiting clearance of blood cultures for PICC placement when he he left [facility] on 1/12.  Per review of records, he was discharged on oral Zyvox to treat his bacteremia and hip infection.      He saw Orthopedics on 1/22.  Per their last note they discussed the seriousness of his condition, the already irreparable damage to the hip, and that he would need to show off IV drugs prior to any consideration of hip replacement.     Last seen by me on 2/8/24.  At that time was still using.  Surgical incisions were well healed.  He was transitioned to oral doxycycline with plan for minimum of 3 months therapy.   Labs drawn that day resulted with critical H/H and he was sent to ED. Received blood transfusion.     He discloses that he is still using IV drugs.  He reports adherence with the antibiotic.    Denies any pain at rest, but he does have pain with weight bearing (he continues walking with a walker).    Reports surgical incision remains well healed.  Denies fevers, chills, sweats.  No n/v/d.  He has chronic bilateral upper extremity skin wounds and continues to follow with  Wound Care in [facility].  Wife does dressing changes.  Per wife and patient, no signs of infection.       Review of Systems   Constitutional: Negative for chills, fever, malaise/fatigue and night sweats.   HENT: Negative.     Eyes: Negative.    Cardiovascular:  Negative for chest pain, leg swelling and palpitations.   Respiratory:  Negative for cough, shortness of breath and sputum production.    Hematologic/Lymphatic: Negative for adenopathy.   Skin:  Positive for poor wound healing (bilateral upper arms). Negative for rash.   Musculoskeletal:  Positive for joint pain. Negative for joint swelling.   Gastrointestinal:  Negative for abdominal pain, diarrhea, nausea and vomiting.   Genitourinary: Negative.    Neurological:  Negative for numbness and paresthesias.   Psychiatric/Behavioral:  Positive for substance abuse. The patient is not nervous/anxious.      Objective:   Physical Exam  Vitals reviewed.   Constitutional:       General: He is not in acute distress.     Appearance: He is not ill-appearing or diaphoretic.   HENT:      Head: Normocephalic and atraumatic.   Eyes:      General: No scleral icterus.     Conjunctiva/sclera: Conjunctivae normal.   Cardiovascular:      Rate and Rhythm: Normal rate and regular rhythm.   Pulmonary:      Effort: Pulmonary effort is normal. No respiratory distress.      Breath sounds: Normal breath sounds.   Abdominal:      General: There is no distension.      Palpations: Abdomen is soft.   Musculoskeletal:      Cervical back: Normal range of motion.      Comments: Right hip incision well healed.    Skin:     General: Skin is warm and dry.      Findings: No rash.      Comments: Bilateral upper extremity chronic wounds are dressed - c/d/I. No drainage noted   Neurological:      Mental Status: He is alert  and oriented to person, place, and time.   Psychiatric:         Mood and Affect: Mood normal.         Behavior: Behavior normal.       3/8/24        2/8/24      1/22/24      Assessment:     1. Staphylococcal arthritis of right hip    2. MRSA bacteremia    3. Long term (current) use of antibiotics    4. History of heroin abuse      48 year old with active IVDU,  chronic HCV, and chronic wounds of bilateral upper and lower extremities admitted in January with MRSA bacteremia (TTE negative), right  hip native septic arthritis with associated osteomyelitis of right acetabulum, myositis, and intramuscular abscess, s/p surgical I&D and washout  of right hip 1/6/24.  Surgical cultures + for MRSA.  Per OP note he has irreparable damage to hip joint and would benefit from hip replacement but not current candidate because of active IVDU.   Left AMA, declined LTAC for IV antibiotics.  Sent out on oral linezolid, then transitioned to oral doxycycline.  Surgical site healed.  No current local or systemic signs of infection.      Plan:    Continue doxycycline 100 mg q 12 hours for now for likely chronic osteo of right hip and no plans for surgical intervention.  Minimum of 3 months oral doxy, but will consider longer term given no plans for surgical intervention and continued IVDU   Check cbc, cmp, crp today   Follow up 2 months    Outpatient addiction psych referral, though patient states he is not interested in considering suboxone or methadone    25 minutes of total time was spent on this encounter, which includes face to face time and non-face to face time preparing to see the patient (eg, review of tests), Obtaining and/or reviewing separately obtained history, documenting clinical information in the electronic or other health record, independently interpreting results (not separately reported) and communicating results to the patient/family/caregiver, or care coordination (not separately reported).

## 2024-03-08 NOTE — PATIENT INSTRUCTIONS
Continue the doxycycline for now.  Remember to take with small amount of food and full glass of water and stay upright for 30 minutes after taking.  Do not take with dairy products, vitamins with minerals, or antacids. Separate by at least 2 hours.  May cause sun sensitivity.  Avoid direct sun exposure.    Cbc, cmp, crp blood work today.     Follow up or or around 5/8.  We'll call you to schedule appointment  4.    Call with any concerns

## 2024-05-01 ENCOUNTER — TELEPHONE (OUTPATIENT)
Dept: ORTHOPEDICS | Facility: CLINIC | Age: 49
End: 2024-05-01
Payer: COMMERCIAL

## 2024-05-01 NOTE — TELEPHONE ENCOUNTER
Spoke with pt  ASSISTANT DOROTA Tadeo PA-C will not be in the office on 5/7/24. Pt is reschedule to 5/9/24 @ 9;00 am. Patient ASICANDIS RAYA states verbal understanding and has no further questions.

## 2024-05-03 ENCOUNTER — TELEPHONE (OUTPATIENT)
Dept: HEPATOLOGY | Facility: CLINIC | Age: 49
End: 2024-05-03
Payer: COMMERCIAL

## 2024-05-03 NOTE — TELEPHONE ENCOUNTER
Patient scheduled to see PA Scheuermann on 5/17/24 at 1 pm.  Provider won't be available for a visit at that time.  I spoke with patient and the above relayed.  Patient's appt moved to 1:30 pm that day and reminder notice mailed.

## 2024-05-04 DIAGNOSIS — M86.159: ICD-10-CM

## 2024-05-04 DIAGNOSIS — M00.051 STAPHYLOCOCCAL ARTHRITIS OF RIGHT HIP: ICD-10-CM

## 2024-05-06 RX ORDER — DOXYCYCLINE HYCLATE 100 MG
100 TABLET ORAL EVERY 12 HOURS
Qty: 60 TABLET | Refills: 2 | Status: SHIPPED | OUTPATIENT
Start: 2024-05-06

## 2024-05-09 ENCOUNTER — OFFICE VISIT (OUTPATIENT)
Dept: ORTHOPEDICS | Facility: CLINIC | Age: 49
End: 2024-05-09
Payer: COMMERCIAL

## 2024-05-09 ENCOUNTER — HOSPITAL ENCOUNTER (OUTPATIENT)
Dept: RADIOLOGY | Facility: HOSPITAL | Age: 49
Discharge: HOME | End: 2024-05-09
Attending: PHYSICIAN ASSISTANT
Payer: COMMERCIAL

## 2024-05-09 VITALS — WEIGHT: 150.81 LBS | BODY MASS INDEX: 23.67 KG/M2 | HEIGHT: 67 IN

## 2024-05-09 DIAGNOSIS — M00.051 STAPHYLOCOCCAL ARTHRITIS OF RIGHT HIP: ICD-10-CM

## 2024-05-09 DIAGNOSIS — M25.561 ACUTE PAIN OF RIGHT KNEE: ICD-10-CM

## 2024-05-09 DIAGNOSIS — M25.561 ACUTE PAIN OF RIGHT KNEE: Primary | ICD-10-CM

## 2024-05-09 DIAGNOSIS — M86.159: ICD-10-CM

## 2024-05-09 PROCEDURE — 73560 X-RAY EXAM OF KNEE 1 OR 2: CPT | Mod: 26,RT,, | Performed by: RADIOLOGY

## 2024-05-09 PROCEDURE — 73560 X-RAY EXAM OF KNEE 1 OR 2: CPT | Mod: TC,RT

## 2024-05-09 PROCEDURE — 3008F BODY MASS INDEX DOCD: CPT | Mod: CPTII,S$GLB,, | Performed by: PHYSICIAN ASSISTANT

## 2024-05-09 PROCEDURE — 99213 OFFICE O/P EST LOW 20 MIN: CPT | Mod: S$GLB,,, | Performed by: PHYSICIAN ASSISTANT

## 2024-05-09 PROCEDURE — 1159F MED LIST DOCD IN RCRD: CPT | Mod: CPTII,S$GLB,, | Performed by: PHYSICIAN ASSISTANT

## 2024-05-09 PROCEDURE — 99999 PR PBB SHADOW E&M-EST. PATIENT-LVL III: CPT | Mod: PBBFAC,,, | Performed by: PHYSICIAN ASSISTANT

## 2024-05-13 ENCOUNTER — PATIENT MESSAGE (OUTPATIENT)
Dept: ORTHOPEDICS | Facility: CLINIC | Age: 49
End: 2024-05-13
Payer: COMMERCIAL

## 2024-05-14 DIAGNOSIS — M25.561 ACUTE PAIN OF RIGHT KNEE: ICD-10-CM

## 2024-05-14 DIAGNOSIS — M00.051 STAPHYLOCOCCAL ARTHRITIS OF RIGHT HIP: Primary | ICD-10-CM

## 2024-05-23 ENCOUNTER — TELEPHONE (OUTPATIENT)
Dept: HEPATOLOGY | Facility: CLINIC | Age: 49
End: 2024-05-23
Payer: COMMERCIAL

## 2024-05-23 NOTE — TELEPHONE ENCOUNTER
Patient was a no-show for scheduled appt with PA Scheuermann on 5/17/24.  I spoke with him.  Appt with provider scheduled again on 5/27/24.

## 2024-05-23 NOTE — PROGRESS NOTES
Subjective     Patient ID: Barrington Amin is a 48 y.o. male.    Chief Complaint: Follow-up (RIGHT KNEE)    HPI    Patient is a 48 year old male who presents to clinic with chief complaint of a-traumatic right anterior knee pain. Reports it is intermittent. It will pop anteriorly and is associated with weak feeling in the leg. He is using a walker. No fever shills increased warmth no alexei with passive range of motion. He can place weight with some issues with hs hip but not his knee.     He is still using and is not ready to quit  He is taking suppressive antibiotics as prescribed by ID.     Review of Systems   Constitutional: Negative for chills and fever.   Cardiovascular:  Negative for chest pain.   Respiratory:  Negative for cough and shortness of breath.    Skin:  Negative for color change, dry skin, itching, nail changes, poor wound healing and rash.   Musculoskeletal:         Right knee pain   Neurological:  Negative for dizziness.   Psychiatric/Behavioral:  Negative for altered mental status. The patient is not nervous/anxious.    All other systems reviewed and are negative.         Objective     General    Constitutional: He is oriented to person, place, and time. He appears well-developed and well-nourished. No distress.   HENT:   Head: Atraumatic.   Eyes: Conjunctivae are normal.   Cardiovascular:  Normal rate.            Pulmonary/Chest: Effort normal.   Neurological: He is alert and oriented to person, place, and time.   Psychiatric: He has a normal mood and affect. His behavior is normal.             Left Knee Exam     Tenderness   The patient tender to palpation of the patellar tendon.    Range of Motion   The patient has normal left knee ROM.    Tests   Meniscus   Tita:  Medial - negative Lateral - negative  Stability   Lachman: normal (-1 to 2mm)   PCL-Posterior Drawer: normal (0 to 2mm)  MCL - Valgus: normal (0 to 2mm)  LCL - Varus: normal (0 to 2mm)      Physical Exam  Constitutional:       General:  He is not in acute distress.     Appearance: He is well-developed and well-nourished. He is not diaphoretic.   HENT:      Head: Atraumatic.   Eyes:      Conjunctiva/sclera: Conjunctivae normal.   Cardiovascular:      Rate and Rhythm: Normal rate.   Pulmonary:      Effort: Pulmonary effort is normal.   Musculoskeletal:      Left knee:      Instability Tests: Medial Tita test negative and lateral Tita test negative.   Neurological:      Mental Status: He is alert and oriented to person, place, and time.   Psychiatric:         Mood and Affect: Mood and affect normal.         Behavior: Behavior normal.          RADS: discussed with MD  There is prominent periarticular bone demineralization with findings asymmetrically affecting the proximal tibia. There is a minimal joint effusion.        Assessment and Plan     Encounter Diagnoses   Name Primary?    Acute pain of right knee Yes    Acute osteomyelitis of pelvis and femur     Staphylococcal arthritis of right hip due to MRSA s/p I+D on 1/6/2024         Discussed with the patient he has no pain with passive range of motion intermittent pain to anterior knee. He is currently on abx for lifetime suppression of his hip osteo. He is feeling weakness in his leg. WIll order PT. Return to clinic as needed.

## 2024-05-27 ENCOUNTER — TELEPHONE (OUTPATIENT)
Dept: HEPATOLOGY | Facility: CLINIC | Age: 49
End: 2024-05-27
Payer: COMMERCIAL

## 2024-05-27 NOTE — TELEPHONE ENCOUNTER
I spoke with patient on 5/23/24.  He confirmed scheduled appt with PA Scheuermann on 5/27/24 but was a no-show for visit.  Attempt made to reach him for rescheduling today.  Unable to leave a  so I sent a letter asking that he call hepatology.

## 2024-09-12 NOTE — SUBJECTIVE & OBJECTIVE
Interval History: Patient reports 4/10 pain to right hip and worse at night. Patient appeared comfortable on exam. Patient remains on IV Vancomycin to treat MRSA right hip infection and bacteremia and pharmacy monitoring levels an dosing. Appreciate ID recs and assistance on this case. [facility] for [facility] IV abx as not candidate for home IV abx as outpatient due to active IV drug use and explained to patient. Hopeful discharge to [facility] on 1/15. Discussed with patient and his wife at bedside. Patient's repeat blood cultures from 1/10 so far are no growth. Patient working with PT/OT and needing low intensity therapy. Hold off on PICC lien at this jeremy as per ID until sure has cleared his MRSA bacteremia. Wound care consulted for bilateral arm wounds. Labs reviewed. Hgb stable at 9 and wa 9 yesterday. Renal function normal with Cr of 0.9.     Review of Systems   Constitutional:  Negative for fever.   Respiratory:  Negative for shortness of breath.    Cardiovascular:  Negative for chest pain.   Gastrointestinal:  Negative for abdominal pain, diarrhea and nausea.   Skin:  Positive for wound (Chronic wounds to bilateral arms).   Neurological:  Negative for dizziness.   Psychiatric/Behavioral:  Negative for agitation and confusion.      Objective:     Vital Signs (Most Recent):  Temp: 98.1 °F (36.7 °C) (01/11/24 1158)  Pulse: 97 (01/11/24 1158)  Resp: 18 (01/11/24 1158)  BP: 117/75 (01/11/24 1158)  SpO2: 98 % (01/11/24 1158) on room air Vital Signs (24h Range):  Temp:  [97.5 °F (36.4 °C)-98.7 °F (37.1 °C)] 98.1 °F (36.7 °C)  Pulse:  [68-97] 97  Resp:  [17-18] 18  SpO2:  [97 %-99 %] 98 %  BP: (113-136)/(64-75) 117/75     Weight: 70.8 kg (156 lb)  Body mass index is 25.96 kg/m².    Intake/Output Summary (Last 24 hours) at 1/11/2024 1630  Last data filed at 1/11/2024 0600  Gross per 24 hour   Intake --   Output 750 ml   Net -750 ml         Physical Exam  Constitutional:        General: He is awake. He is not in acute distress.     Appearance: He is well-developed. He is ill-appearing.      Comments: In good mood.Family at bedside.   HENT:      Mouth/Throat:      Pharynx: No oropharyngeal exudate.   Eyes:      Conjunctiva/sclera: Conjunctivae normal.   Cardiovascular:      Rate and Rhythm: Normal rate and regular rhythm.      Heart sounds: Normal heart sounds. No murmur heard.  Pulmonary:      Effort: Pulmonary effort is normal. No respiratory distress.      Breath sounds: Normal breath sounds. No wheezing.   Abdominal:      General: Abdomen is flat. Bowel sounds are normal. There is no distension.      Palpations: Abdomen is soft.      Tenderness: There is no abdominal tenderness.   Musculoskeletal:         General: Swelling (mild swelling over R hip) and tenderness (TTP over R hip with limited ROM due to pain) present.      Comments: Bandages to right hip and right and left arms.   Skin:     General: Skin is warm and dry.      Findings: Lesion (large are of chronic wounds over bilateral upper and lower extremities w/o eschar or drainage (see pictures under media section)) present. No rash.      Comments: Bandages to bilateral upper arms.   Neurological:      Mental Status: He is alert and oriented to person, place, and time.      Cranial Nerves: No cranial nerve deficit.      Motor: No abnormal muscle tone.      Coordination: Coordination normal.      Deep Tendon Reflexes: Reflexes are normal and symmetric. Reflexes normal.   Psychiatric:         Mood and Affect: Mood normal.         Behavior: Behavior normal. Behavior is cooperative.         Thought Content: Thought content normal.         Judgment: Judgment normal.             Significant Labs: CBC:   Recent Labs   Lab 01/10/24  0252 01/11/24  0546   WBC 8.61 8.07   HGB 9.1* 9.0*   HCT 30.7* 30.9*   * 613*     CMP:   Recent Labs   Lab 01/10/24  0252 01/11/24  0546    138   K 3.7 3.5    107   CO2 22* 22*   GLU 77 102    BUN 19 18   CREATININE 1.1 0.9   CALCIUM 9.5 9.6   PROT 9.1* 9.0*   ALBUMIN 2.6* 2.7*   BILITOT 0.3 0.3   ALKPHOS 94 98   AST 24 19   ALT 18 16   ANIONGAP 10 9     Blood Culture, Routine   Date Value Ref Range Status   01/10/2024 No Growth to date  Preliminary   01/10/2024 No Growth to date  Preliminary   01/10/2024 No Growth to date  Preliminary   01/10/2024 No Growth to date  Preliminary       Significant Imaging: I have reviewed all pertinent imaging results/findings within the past 24 hours.   108.6

## 2025-01-14 ENCOUNTER — PATIENT MESSAGE (OUTPATIENT)
Dept: INFECTIOUS DISEASES | Facility: CLINIC | Age: 50
End: 2025-01-14
Payer: COMMERCIAL

## 2025-01-16 ENCOUNTER — HOSPITAL ENCOUNTER (OUTPATIENT)
Dept: RADIOLOGY | Facility: HOSPITAL | Age: 50
Discharge: HOME | End: 2025-01-16
Attending: PHYSICIAN ASSISTANT
Payer: COMMERCIAL

## 2025-01-16 ENCOUNTER — OFFICE VISIT (OUTPATIENT)
Dept: ORTHOPEDICS | Facility: CLINIC | Age: 50
End: 2025-01-16
Payer: COMMERCIAL

## 2025-01-16 VITALS — BODY MASS INDEX: 23.67 KG/M2 | WEIGHT: 150.81 LBS | HEIGHT: 67 IN

## 2025-01-16 DIAGNOSIS — M86.159: ICD-10-CM

## 2025-01-16 DIAGNOSIS — M25.551 RIGHT HIP PAIN: Primary | ICD-10-CM

## 2025-01-16 DIAGNOSIS — M25.551 RIGHT HIP PAIN: ICD-10-CM

## 2025-01-16 DIAGNOSIS — M00.051 STAPHYLOCOCCAL ARTHRITIS OF RIGHT HIP: ICD-10-CM

## 2025-01-16 PROCEDURE — 99999 PR PBB SHADOW E&M-EST. PATIENT-LVL III: CPT | Mod: PBBFAC,,, | Performed by: PHYSICIAN ASSISTANT

## 2025-01-16 PROCEDURE — 73502 X-RAY EXAM HIP UNI 2-3 VIEWS: CPT | Mod: 26,RT,, | Performed by: RADIOLOGY

## 2025-01-16 PROCEDURE — 73502 X-RAY EXAM HIP UNI 2-3 VIEWS: CPT | Mod: TC,RT

## 2025-01-16 PROCEDURE — 3008F BODY MASS INDEX DOCD: CPT | Mod: CPTII,S$GLB,, | Performed by: PHYSICIAN ASSISTANT

## 2025-01-16 PROCEDURE — 1159F MED LIST DOCD IN RCRD: CPT | Mod: CPTII,S$GLB,, | Performed by: PHYSICIAN ASSISTANT

## 2025-01-16 PROCEDURE — 99213 OFFICE O/P EST LOW 20 MIN: CPT | Mod: S$GLB,,, | Performed by: PHYSICIAN ASSISTANT

## 2025-01-16 RX ORDER — DOXYCYCLINE 100 MG/1
100 CAPSULE ORAL 2 TIMES DAILY
Qty: 60 CAPSULE | Refills: 2 | Status: SHIPPED | OUTPATIENT
Start: 2025-01-16 | End: 2025-04-16

## 2025-01-16 RX ORDER — DOXYCYCLINE HYCLATE 100 MG
100 TABLET ORAL EVERY 12 HOURS
Qty: 60 TABLET | Refills: 2 | Status: SHIPPED | OUTPATIENT
Start: 2025-01-16

## 2025-01-17 NOTE — PROGRESS NOTES
Subjective     Patient ID: Barrington Amin is a 49 y.o. male.    Chief Complaint: Follow-up (RIGHT HIP)    HPI    Patient is a 49 year old male who presents to clinic with chief complaint of a-traumatic right hip pain.  Stated that it was hurting him but is not hurting him anymore. It began 01/05/25 after falling onto his right hip. Diagnosed with a hematoma. He stated that it is not longer hurting or bothering him. He does have issues with groin pain but is aware of his AVN. He was to be on doxycycline, but has not been taking.     He is still using and is not ready to quit  He is taking suppressive antibiotics as prescribed by ID.     Review of Systems   Constitutional: Negative for chills and fever.   Cardiovascular:  Negative for chest pain.   Respiratory:  Negative for cough and shortness of breath.    Skin:  Negative for color change, dry skin, itching, nail changes, poor wound healing and rash.   Musculoskeletal:         Right hip pain   Neurological:  Negative for dizziness.   Psychiatric/Behavioral:  Negative for altered mental status. The patient is not nervous/anxious.    All other systems reviewed and are negative.         Objective     General    Constitutional: He is oriented to person, place, and time. He appears well-developed and well-nourished. No distress.   HENT:   Head: Atraumatic.   Eyes: Conjunctivae are normal.   Cardiovascular:  Normal rate.            Pulmonary/Chest: Effort normal.   Neurological: He is alert and oriented to person, place, and time.   Psychiatric: He has a normal mood and affect. His behavior is normal.             Right Hip Exam     Comments:  Presents to clinic in wheelchair, no TTP, increased pain with limited range of motion.         Physical Exam  Constitutional:       General: He is not in acute distress.     Appearance: He is well-developed and well-nourished. He is not diaphoretic.   HENT:      Head: Atraumatic.   Eyes:      Conjunctiva/sclera: Conjunctivae normal.    Cardiovascular:      Rate and Rhythm: Normal rate.   Pulmonary:      Effort: Pulmonary effort is normal.   Neurological:      Mental Status: He is alert and oriented to person, place, and time.   Psychiatric:         Mood and Affect: Mood and affect normal.         Behavior: Behavior normal.          RADS:   Patient has collapse of the right femoral head possibly from avascular necrosis. Loss of contour can be seen in joint space is narrowed. Left hip joint is preserved.        Assessment and Plan     Encounter Diagnoses   Name Primary?    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 Will reorder doxycycline since he has been out. Discussed importance of medication and maintaining medication usage. He has follow up with ID. He will continued use of the walker. At this time no further orthopedic intervention is recommended. He continues to be an IVDA and has no intention on quitting. Return to clinic as needed.

## 2025-05-16 ENCOUNTER — TELEPHONE (OUTPATIENT)
Dept: PODIATRY | Facility: CLINIC | Age: 50
End: 2025-05-16
Payer: COMMERCIAL

## 2025-05-30 ENCOUNTER — TELEPHONE (OUTPATIENT)
Dept: PODIATRY | Facility: CLINIC | Age: 50
End: 2025-05-30

## 2025-05-30 NOTE — TELEPHONE ENCOUNTER
patient missed appointment called to reschedule appointment to next available he ok with day and time.

## 2025-06-25 ENCOUNTER — PATIENT MESSAGE (OUTPATIENT)
Dept: PODIATRY | Facility: CLINIC | Age: 50
End: 2025-06-25
Payer: COMMERCIAL

## 2025-07-07 ENCOUNTER — TELEPHONE (OUTPATIENT)
Dept: DERMATOLOGY | Facility: CLINIC | Age: 50
End: 2025-07-07
Payer: COMMERCIAL

## 2025-08-02 DIAGNOSIS — M00.051 STAPHYLOCOCCAL ARTHRITIS OF RIGHT HIP: ICD-10-CM

## 2025-08-02 DIAGNOSIS — M86.159: ICD-10-CM

## 2025-08-04 RX ORDER — DOXYCYCLINE 100 MG/1
100 CAPSULE ORAL 2 TIMES DAILY
Qty: 60 CAPSULE | Refills: 2 | Status: SHIPPED | OUTPATIENT
Start: 2025-08-04

## 2025-08-14 ENCOUNTER — OFFICE VISIT (OUTPATIENT)
Dept: INFECTIOUS DISEASES | Facility: CLINIC | Age: 50
End: 2025-08-14
Payer: COMMERCIAL

## 2025-08-14 VITALS
BODY MASS INDEX: 25.57 KG/M2 | TEMPERATURE: 99 F | SYSTOLIC BLOOD PRESSURE: 144 MMHG | DIASTOLIC BLOOD PRESSURE: 81 MMHG | HEIGHT: 67 IN | WEIGHT: 162.94 LBS | HEART RATE: 105 BPM

## 2025-08-14 DIAGNOSIS — M00.051 STAPHYLOCOCCAL ARTHRITIS OF RIGHT HIP: Primary | ICD-10-CM

## 2025-08-14 DIAGNOSIS — M86.159: ICD-10-CM

## 2025-08-14 PROCEDURE — 99999 PR PBB SHADOW E&M-EST. PATIENT-LVL IV: CPT | Mod: PBBFAC,,, | Performed by: NURSE PRACTITIONER

## 2025-08-14 RX ORDER — RIVAROXABAN 20 MG/1
20 TABLET, FILM COATED ORAL
COMMUNITY

## (undated) DEVICE — PEN LIGHTS DIAGNOSTIC C-LINE

## (undated) DEVICE — DRESSING ABSRBNT ISLAND 3.6X8

## (undated) DEVICE — DRAPE THREE-QTR REINF 53X77IN

## (undated) DEVICE — KIT EVACUATOR 3-SPRING 1/8 DRN

## (undated) DEVICE — ADHESIVE DERMABOND ADVANCED

## (undated) DEVICE — BANDAGE ELAS SOFTWRAP ST 6X5YD

## (undated) DEVICE — TRAY MINOR ORTHO OMC

## (undated) DEVICE — SOCKINETTE IMPERVIOUS 12X48IN

## (undated) DEVICE — DRAPE STERI U-SHAPED 47X51IN

## (undated) DEVICE — DRESSING AQUACEL AG RBBN 2X45

## (undated) DEVICE — TAPE SURG DURAPORE 2 X10YD

## (undated) DEVICE — DRESSING GAUZE XEROFORM 5X9

## (undated) DEVICE — SUT ETHILON 3/0 18IN PS-1

## (undated) DEVICE — DRAIN CHANNEL ROUND 10FR

## (undated) DEVICE — EVACUATOR WOUND BULB 100CC

## (undated) DEVICE — DRAPE HIP PCH 112X137X89IN

## (undated) DEVICE — COVER MAYO STAND REINFRCD 30

## (undated) DEVICE — SPONGE LAP 18X18 PREWASHED

## (undated) DEVICE — DRAPE U SPLIT SHEET 54X76IN

## (undated) DEVICE — PAD CAST SPECIALIST STRL 4

## (undated) DEVICE — BNDG COFLEX FOAM LF2 ST 6X5YD

## (undated) DEVICE — BANDAGE ELAS SOFTWRAP ST 4X5YD

## (undated) DEVICE — GOWN AERO CHROME W/ TOWEL XL

## (undated) DEVICE — SPONGE COTTON TRAY 4X4IN

## (undated) DEVICE — DRAPE TOP 53X102IN